# Patient Record
Sex: FEMALE | Race: WHITE | Employment: OTHER | URBAN - METROPOLITAN AREA
[De-identification: names, ages, dates, MRNs, and addresses within clinical notes are randomized per-mention and may not be internally consistent; named-entity substitution may affect disease eponyms.]

---

## 2017-01-19 ENCOUNTER — OFFICE VISIT (OUTPATIENT)
Dept: INTERNAL MEDICINE CLINIC | Age: 82
End: 2017-01-19

## 2017-01-19 VITALS
TEMPERATURE: 96.6 F | HEART RATE: 87 BPM | DIASTOLIC BLOOD PRESSURE: 90 MMHG | OXYGEN SATURATION: 98 % | HEIGHT: 64 IN | RESPIRATION RATE: 14 BRPM | SYSTOLIC BLOOD PRESSURE: 164 MMHG | WEIGHT: 164 LBS | BODY MASS INDEX: 28 KG/M2

## 2017-01-19 DIAGNOSIS — M54.2 CHRONIC NECK PAIN: ICD-10-CM

## 2017-01-19 DIAGNOSIS — Z00.00 MEDICARE ANNUAL WELLNESS VISIT, SUBSEQUENT: ICD-10-CM

## 2017-01-19 DIAGNOSIS — F41.9 ANXIETY: ICD-10-CM

## 2017-01-19 DIAGNOSIS — E78.00 HYPERCHOLESTEROLEMIA: ICD-10-CM

## 2017-01-19 DIAGNOSIS — G89.29 CHRONIC NECK PAIN: ICD-10-CM

## 2017-01-19 DIAGNOSIS — K21.9 GASTROESOPHAGEAL REFLUX DISEASE WITHOUT ESOPHAGITIS: ICD-10-CM

## 2017-01-19 DIAGNOSIS — Z71.89 ADVANCE DIRECTIVE DISCUSSED WITH PATIENT: ICD-10-CM

## 2017-01-19 DIAGNOSIS — M48.02 DEGENERATIVE CERVICAL SPINAL STENOSIS: ICD-10-CM

## 2017-01-19 DIAGNOSIS — I10 HYPERTENSION, ESSENTIAL: Primary | ICD-10-CM

## 2017-01-19 RX ORDER — CHOLECALCIFEROL (VITAMIN D3) 125 MCG
CAPSULE ORAL
COMMUNITY
End: 2018-06-04

## 2017-01-19 RX ORDER — AMLODIPINE BESYLATE 5 MG/1
5 TABLET ORAL DAILY
Qty: 90 TAB | Refills: 3
Start: 2017-01-19 | End: 2017-12-11 | Stop reason: SDUPTHER

## 2017-01-19 RX ORDER — GABAPENTIN 100 MG/1
100 CAPSULE ORAL 3 TIMES DAILY
Qty: 90 CAP | Refills: 1 | Status: SHIPPED | OUTPATIENT
Start: 2017-01-19 | End: 2017-03-02

## 2017-01-19 NOTE — PROGRESS NOTES
This is a Subsequent Medicare Annual Wellness Visit providing Personalized Prevention Plan Services (PPPS) (Performed 12 months after initial AWV and PPPS )    I have reviewed the patient's medical history in detail and updated the computerized patient record. History     Past Medical History   Diagnosis Date    GERD (gastroesophageal reflux disease)     Hypercholesterolemia     Hypertension       Past Surgical History   Procedure Laterality Date    Hx orthopaedic  1996     Lumbar laminectomy    Pr abdomen surgery proc unlisted  2005     Laproscopic colon polyp excision Dr. Yuri Ortiz Hx appendectomy       Age 25    Hx heent Bilateral      cataracts    Pr colsc flx w/rmvl of tumor polyp lesion snare tq  7/18/2014           Current Outpatient Prescriptions   Medication Sig Dispense Refill    naproxen sodium (ALEVE) 220 mg cap Take  by mouth.  lactose-reduced food with fibr (QUINOA-KALE-HEMP) liqd Take  by mouth.  amLODIPine (NORVASC) 5 mg tablet TAKE TWO TABLETS BY MOUTH ONCE DAILY. 90 Tab 3    atorvastatin (LIPITOR) 20 mg tablet TAKE ONE TABLET BY MOUTH ONCE DAILY 90 Tab 3    losartan (COZAAR) 100 mg tablet TAKE ONE TABLET BY MOUTH ONCE DAILY 90 Tab 3    ranitidine (ZANTAC) 150 mg tablet TAKE ONE TABLET BY MOUTH TWICE DAILY 180 Tab 3    cholecalciferol (VITAMIN D3) 1,000 unit cap Take  by mouth daily.  furosemide (LASIX) 40 mg tablet TAKE ONE TABLET BY MOUTH ONCE DAILY 90 Tab 0    magnesium oxide (MAG-OX) 400 mg tablet Take 1 Tab by mouth daily. 30 Tab 12    polyethylene glycol (MIRALAX) 17 gram/dose powder Take 17 g by mouth daily.        Allergies   Allergen Reactions    Contrast Agent [Iodine] Hives    Penicillins Hives    Bystolic [Nebivolol] Other (comments)     abd pain    Cardizem [Diltiazem Hcl] Rash    Cardura [Doxazosin] Unknown (comments)    Codeine Nausea Only    Prinivil [Lisinopril] Unknown (comments)    Tekturna [Aliskiren] Other (comments)     abd cramps    Tenormin [Atenolol] Unknown (comments)     Family History   Problem Relation Age of Onset    Heart Disease Mother     Stroke Mother     Cancer Father     Cancer Brother     Heart Disease Brother     Cancer Brother      Social History   Substance Use Topics    Smoking status: Never Smoker    Smokeless tobacco: Never Used    Alcohol use Not on file     Patient Active Problem List   Diagnosis Code    Hypertension, essential I10    Plantar fasciitis M72.2    Spinal stenosis M48.00    Cervical neck pain with evidence of disc disease M50.90    GERD (gastroesophageal reflux disease) K21.9    Rotator cuff rupture M75.100    Tubular adenoma of colon D12.6    Advance directive on file Z78.9    Osteopenia with high risk of fracture M85.80    Hypercholesterolemia E78.00       Depression Risk Factor Screening:     PHQ 2 / 9, over the last two weeks 1/19/2017   Little interest or pleasure in doing things Not at all   Feeling down, depressed or hopeless Not at all   Total Score PHQ 2 0     Alcohol Risk Factor Screening: On any occasion during the past 3 months, have you had more than 3 drinks containing alcohol? No    Do you average more than 7 drinks per week? No      Functional Ability and Level of Safety:     Hearing Loss   normal-to-mild    Activities of Daily Living   Self-care. Requires assistance with: no ADLs    Fall Risk     Fall Risk Assessment, last 12 mths 1/19/2017   Able to walk? Yes   Fall in past 12 months?  No     Abuse Screen   Patient is not abused    Review of Systems   Not required    Physical Examination     Evaluation of Cognitive Function:  Mood/affect:  happy  Appearance: age appropriate, casually dressed and within normal Limits  Family member/caregiver input: none      Patient Care Team:  Brina Bonilla MD as PCP - General (Internal Medicine)  Paula Pritchett MD (Pain Management)  Paty Graham MD (Ophthalmology)    Advice/Referrals/Counseling   Education and counseling provided:  End-of-Life planning (with patient's consent)    Assessment/Plan   See other note this date

## 2017-01-19 NOTE — PROGRESS NOTES
HISTORY OF PRESENT ILLNESS  Samantha Pickens is a 80 y.o. female. HPI  CARDIOVASCULAR  She presents for follow up of hypertension and hyperlipidemia. Diet and Lifestyle: generally follows a low fat low cholesterol diet, generally follows a low sodium diet, exercises sporadically, nonsmoker   Medication compliance: compliant most of the time  Medication side effects: none  Home BP Monitoring: is not measured at home. Cardiovascular ROS:  She denies palpitations, orthopnea, exertional chest pressure/discomfort, claudication, lower extremity edema, dyspnea on exertion, dizziness    GERD  She presents for follow up of gastroesophageal reflux. She denies dysphagia, has not lost weight denies heartburn. Neck pain: has had KATHY without relief by Dr Adolfo Pereira. . Had MRI with spinal stenosis and foraminal narrowing. Saw Dr Michael Valdez who said no surgical remedy. Now getting massage which seems to help. Can turn head better. Pain is still 7/10. No pain going down arms or across shoulders. Went back to gym yesterday and walked on treadmill for first time without increased pain. Tramadol did not help and made her constipated. Using Ice for 15 min than heat for 15 min and that helps too. Anxiety Review:  Taking hemp oil bid for anxiety and it helps. Patient denies: racing thoughts, psychomotor agitation, feelings of losing control, difficulty concentrating.        Patient Active Problem List   Diagnosis Code    Hypertension, essential I10    Plantar fasciitis M72.2    Spinal stenosis M48.00    Cervical neck pain with evidence of disc disease M50.90    Hyperlipidemia E78.5    GERD (gastroesophageal reflux disease) K21.9    Rotator cuff rupture M75.100    Tubular adenoma of colon D12.6    Advance directive on file Z78.9    Osteopenia with high risk of fracture M85.80     Past Medical History   Diagnosis Date    GERD (gastroesophageal reflux disease)     Hypercholesterolemia     Hypertension      Past Surgical History   Procedure Laterality Date    Hx orthopaedic  1996     Lumbar laminectomy    Pr abdomen surgery proc unlisted  2005     Laproscopic colon polyp excision Dr. Jesse Oleary Hx appendectomy       Age 25    Hx heent Bilateral      cataracts    Pr colsc flx w/rmvl of tumor polyp lesion snare tq  7/18/2014           Social History     Social History    Marital status:      Spouse name: N/A    Number of children: N/A    Years of education: N/A     Social History Main Topics    Smoking status: Never Smoker    Smokeless tobacco: Never Used    Alcohol use None    Drug use: None    Sexual activity: Not Asked     Other Topics Concern    None     Social History Narrative     Family History   Problem Relation Age of Onset    Heart Disease Mother     Stroke Mother     Cancer Father     Cancer Brother     Heart Disease Brother     Cancer Brother      Allergies   Allergen Reactions    Contrast Agent [Iodine] Hives    Penicillins Hives    Bystolic [Nebivolol] Other (comments)     abd pain    Cardizem [Diltiazem Hcl] Rash    Cardura [Doxazosin] Unknown (comments)    Codeine Nausea Only    Prinivil [Lisinopril] Unknown (comments)    Tekturna [Aliskiren] Other (comments)     abd cramps    Tenormin [Atenolol] Unknown (comments)     Current Outpatient Prescriptions   Medication Sig Dispense Refill    naproxen sodium (ALEVE) 220 mg cap Take  by mouth.  lactose-reduced food with fibr (QUINOA-KALE-HEMP) liqd Take  by mouth.  amLODIPine (NORVASC) 5 mg tablet TAKE TWO TABLETS BY MOUTH ONCE DAILY. 90 Tab 3    atorvastatin (LIPITOR) 20 mg tablet TAKE ONE TABLET BY MOUTH ONCE DAILY 90 Tab 3    losartan (COZAAR) 100 mg tablet TAKE ONE TABLET BY MOUTH ONCE DAILY 90 Tab 3    ranitidine (ZANTAC) 150 mg tablet TAKE ONE TABLET BY MOUTH TWICE DAILY 180 Tab 3    cholecalciferol (VITAMIN D3) 1,000 unit cap Take  by mouth daily.       furosemide (LASIX) 40 mg tablet TAKE ONE TABLET BY MOUTH ONCE DAILY 90 Tab 0    magnesium oxide (MAG-OX) 400 mg tablet Take 1 Tab by mouth daily. 30 Tab 12    polyethylene glycol (MIRALAX) 17 gram/dose powder Take 17 g by mouth daily. Review of Systems   Constitutional: Negative for malaise/fatigue and weight loss. Gastrointestinal: Negative for constipation (Miralax), diarrhea and heartburn. Musculoskeletal: Positive for neck pain. Negative for back pain and joint pain. Neurological: Negative for dizziness, tingling and focal weakness. Visit Vitals    /90 (BP 1 Location: Left arm, BP Patient Position: Sitting)    Pulse 87    Temp 96.6 °F (35.9 °C) (Oral)    Resp 14    Ht 5' 4\" (1.626 m)    Wt 164 lb (74.4 kg)    SpO2 98%    BMI 28.15 kg/m2     Physical Exam   Constitutional: She is oriented to person, place, and time. She appears well-developed and well-nourished. HENT:   Head: Normocephalic and atraumatic. Eyes: Conjunctivae are normal. Pupils are equal, round, and reactive to light. Neck: Neck supple. Carotid bruit is not present. No thyromegaly present. Cardiovascular: Normal rate, regular rhythm and normal heart sounds. PMI is not displaced. Exam reveals no gallop. No murmur heard. Pulses:       Dorsalis pedis pulses are 2+ on the right side, and 2+ on the left side. Posterior tibial pulses are 2+ on the right side, and 2+ on the left side. Pulmonary/Chest: Effort normal. She has no wheezes. She has no rhonchi. She has no rales. Abdominal: Soft. Normal appearance. She exhibits no abdominal bruit and no mass. There is no hepatosplenomegaly. There is no tenderness. Musculoskeletal: She exhibits no edema. Cervical back: She exhibits tenderness, pain and spasm. She exhibits no bony tenderness. Back:    Lymphadenopathy:     She has no cervical adenopathy. Right: No supraclavicular adenopathy present. Left: No inguinal and no supraclavicular adenopathy present. Neurological: She is alert and oriented to person, place, and time. No sensory deficit. Skin: Skin is warm, dry and intact. No rash noted. Psychiatric: She has a normal mood and affect. Her behavior is normal.   Nursing note and vitals reviewed. Results for orders placed or performed in visit on 67/30/64   METABOLIC PANEL, COMPREHENSIVE   Result Value Ref Range    Glucose 97 65 - 99 mg/dL    BUN 15 8 - 27 mg/dL    Creatinine 0.83 0.57 - 1.00 mg/dL    GFR est non-AA 66 >59 mL/min/1.73    GFR est AA 76 >59 mL/min/1.73    BUN/Creatinine ratio 18 11 - 26    Sodium 143 134 - 144 mmol/L    Potassium 4.1 3.5 - 5.2 mmol/L    Chloride 103 97 - 108 mmol/L    CO2 21 18 - 29 mmol/L    Calcium 9.9 8.7 - 10.3 mg/dL    Protein, total 6.8 6.0 - 8.5 g/dL    Albumin 4.4 3.5 - 4.7 g/dL    GLOBULIN, TOTAL 2.4 1.5 - 4.5 g/dL    A-G Ratio 1.8 1.1 - 2.5    Bilirubin, total 0.4 0.0 - 1.2 mg/dL    Alk. phosphatase 94 39 - 117 IU/L    AST 34 0 - 40 IU/L    ALT 31 0 - 32 IU/L   LIPID PANEL   Result Value Ref Range    Cholesterol, total 158 100 - 199 mg/dL    Triglyceride 127 0 - 149 mg/dL    HDL Cholesterol 45 >39 mg/dL    VLDL, calculated 25 5 - 40 mg/dL    LDL, calculated 88 0 - 99 mg/dL   CVD REPORT   Result Value Ref Range    INTERPRETATION Note        ASSESSMENT and PLAN    ICD-10-CM ICD-9-CM    1. Hypertension, essential I10 401.9 amLODIPine (NORVASC) 5 mg tablet   2. Hypercholesterolemia X79.15 765.0 METABOLIC PANEL, COMPREHENSIVE      LIPID PANEL   3. Gastroesophageal reflux disease without esophagitis K21.9 530.81    4. Degenerative cervical spinal stenosis M48.02 723.0 gabapentin (NEURONTIN) 100 mg capsule   5. Chronic neck pain M54.2 723.1     G89.29 338.29    6. Anxiety F41.9 300.00    7. Medicare annual wellness visit, subsequent Z00.00 V70.0    8.  Advance directive discussed with patient Z71.89 V65.49          Hypertension, essential  Hypertension is uncontrolled - medication changes/orders   Limit naproxen  - amLODIPine (NORVASC) 5 mg tablet; Take 1 Tab by mouth daily. Hypercholesterolemia  Hyperlipidemia is controlled  -     METABOLIC PANEL, COMPREHENSIVE; Future  -     LIPID PANEL; Future    Gastroesophageal reflux disease without esophagitis  Stable    Degenerative cervical spinal stenosis  -     gabapentin (NEURONTIN) 100 mg capsule; Take 1 Cap by mouth three (3) times daily. Chronic neck pain  I think some of her pain is muscular compounding the degenerative disease that is also causing symptoms. Anxiety  Controlled    Medicare annual wellness visit, subsequent    Advance directive discussed with patient      Follow-up Disposition:  Return in about 6 weeks (around 3/2/2017) for HTN, neck pain . lab results and schedule of future lab studies reviewed with patient  reviewed diet, exercise and weight control  cardiovascular risk and specific lipid/LDL goals reviewed  I have discussed the diagnosis with the patient and the intended plan as seen in the above orders. Patient is in agreement. The patient has received an after-visit summary and questions were answered concerning future plans. I have discussed medication side effects and warnings with the patient as well.

## 2017-01-19 NOTE — MR AVS SNAPSHOT
Visit Information Date & Time Provider Department Dept. Phone Encounter #  
 1/19/2017  2:00 PM Deepa Poon MD [facility] [phone] [id number] Follow-up Instructions Return in about 6 weeks (around 3/2/2017) for HTN, neck pain . Upcoming Health Maintenance Date Due  
 MEDICARE YEARLY EXAM 10/26/2016 GLAUCOMA SCREENING Q2Y 3/1/2018 DTaP/Tdap/Td series (2 - Td) 7/16/2023 Allergies as of 1/19/2017  Review Complete On: 1/19/2017 By: Deepa Poon MD  
  
 Severity Noted Reaction Type Reactions Contrast Agent [Iodine] High 03/01/2010    Hives Penicillins High 03/01/2010    Hives Bystolic [Nebivolol]  51/32/2097    Other (comments)  
 abd pain  
 Cardizem [Diltiazem Hcl]  03/01/2010    Rash Cardura [Doxazosin]  03/01/2010    Unknown (comments) Codeine  03/01/2010    Nausea Only Cymbalta [Duloxetine]  01/19/2017    Other (comments) Abdominal pain, anxiety Prinivil [Lisinopril]  03/01/2010    Unknown (comments) Tekturna [Aliskiren]  03/01/2010    Other (comments)  
 abd cramps Tenormin [Atenolol]  03/01/2010    Unknown (comments) Current Immunizations  Reviewed on 1/19/2017 Name Date Influenza High Dose Vaccine PF 12/12/2016, 10/26/2015, 10/3/2014 Influenza Vaccine 9/17/2013 Pneumococcal Conjugate (PCV-13) 4/3/2015 Pneumococcal Vaccine (Unspecified Type) 10/1/2002 TD Vaccine 6/1/2007 Tdap 7/16/2013 Zoster 6/30/2009 Reviewed by Raghu Campos LPN on 2/94/2505 at  1:54 PM  
You Were Diagnosed With   
  
 Codes Comments Hypertension, essential    -  Primary ICD-10-CM: I10 
ICD-9-CM: 401.9 Hypercholesterolemia     ICD-10-CM: E78.00 ICD-9-CM: 272.0 Gastroesophageal reflux disease without esophagitis     ICD-10-CM: K21.9 ICD-9-CM: 530.81 Degenerative cervical spinal stenosis     ICD-10-CM: M48.02 
ICD-9-CM: 723.0 Medicare annual wellness visit, subsequent     ICD-10-CM: Z00.00 ICD-9-CM: V70.0 Advance directive discussed with patient     ICD-10-CM: Z71.89 ICD-9-CM: V65.49 Vitals BP Pulse Temp Resp Height(growth percentile) Weight(growth percentile) 164/90 (BP 1 Location: Left arm, BP Patient Position: Sitting) 87 96.6 °F (35.9 °C) (Oral) 14 5' 4\" (1.626 m) 164 lb (74.4 kg) SpO2 BMI OB Status Smoking Status 98% 28.15 kg/m2 Postmenopausal Never Smoker Vitals History BMI and BSA Data Body Mass Index Body Surface Area  
 28.15 kg/m 2 1.83 m 2 Preferred Pharmacy Pharmacy Name Phone [facility] [address] [place] [address] [phone] Your Updated Medication List  
  
   
This list is accurate as of: 1/19/17  3:08 PM.  Always use your most recent med list.  
  
  
  
  
 ALEVE 220 mg Cap Generic drug:  naproxen sodium Take  by mouth. amLODIPine 5 mg tablet Commonly known as:  [place] Take 1 Tab by mouth daily. atorvastatin 20 mg tablet Commonly known as:  LIPITOR  
TAKE ONE TABLET BY MOUTH ONCE DAILY  
  
 cholecalciferol 1,000 unit Cap Commonly known as:  VITAMIN D3 Take  by mouth daily. furosemide 40 mg tablet Commonly known as:  LASIX TAKE ONE TABLET BY MOUTH ONCE DAILY  
  
 gabapentin 100 mg capsule Commonly known as:  NEURONTIN Take 1 Cap by mouth three (3) times daily. losartan 100 mg tablet Commonly known as:  COZAAR  
TAKE ONE TABLET BY MOUTH ONCE DAILY  
  
 magnesium oxide 400 mg tablet Commonly known as:  MAG-OX Take 1 Tab by mouth daily. MIRALAX 17 gram/dose powder Generic drug:  polyethylene glycol Take 17 g by mouth daily. [place] Generic drug:  lactose-reduced food with fibr Take  by mouth. raNITIdine 150 mg tablet Commonly known as:  ZANTAC TAKE ONE TABLET BY MOUTH TWICE DAILY Prescriptions Sent to Pharmacy Refills gabapentin (NEURONTIN) 100 mg capsule 1 Sig: Take 1 Cap by mouth three (3) times daily. Class: Normal  
 Pharmacy: [id number] #: [phone] Route: Oral  
  
Follow-up Instructions Return in about 6 weeks (around 3/2/2017) for HTN, neck pain . To-Do List   
 07/19/2017 Lab:  LIPID PANEL   
  
 07/19/2017 Lab:  METABOLIC PANEL, COMPREHENSIVE Patient Instructions Start gabapentin 100 mg 3 times a day. If it does not help, call me and we will gradually increase the dose. Take as little Aleve as possible. Return in 6 week for blood pressure Return in 6 weeks and we will see if blood pressure comes down as pain decreases. The best way to stay healthy is to live a healthy lifestyle. A healthy lifestyle includes regular exercise, eating a well-balanced diet, keeping a healthy weight and not smoking. Regular physical exams and screening tests are another important way to take care of yourself. Preventive exams provided by health care providers can find health problems early when treatment works best and can keep you from getting certain diseases or illnesses. Preventive services include exams, lab tests, screenings, shots, monitoring and information to help you take care of your own health. All people over 65 should have a pneumonia shot. Pneumonia shots are usually only needed once in a lifetime unless your doctor decides differently. In addition to your physical exam, some screening tests are recommended: 
 
All people over 65 should have a yearly flu shot. People over 65 are at medium to high risk for Hepatitis B. Three shots are needed for complete protection. Bone mass measurement (dexa scan) is recommended every two years. Diabetes Mellitus screening is recommended every year. Glaucoma is an eye disease caused by high pressure in the eye. An eye exam is recommended every year. Cardiovascular screening tests that check your cholesterol and other blood fat (lipid) levels are recommended every five years. Colorectal Cancer screening tests help to find pre-cancerous polyps (growths in the colon) so they can be removed before they turn into cancer. Tests ordered for screening depend on your personal and family history risk factors. Prostate Cancer Screening (annually up to age 76) Screening for breast cancer is recommended yearly with a Mammogram. 
 
Screening for cervical and vaginal cancer is recommended with a pelvic and Pap test every two years. However if you have had an abnormal pap in the past  three years or at high risk for cervical or vaginal cancer Medicare will cover a pap test and a pelvic exam every year. Here is a list of your current Health Maintenance items with a due date: 
Health Maintenance Due Topic Date Due  
 Annual Well Visit  10/26/2016 High Blood Pressure: Care Instructions Your Care Instructions If your blood pressure is usually above 140/90, you have high blood pressure, or hypertension. That means the top number is 140 or higher or the bottom number is 90 or higher, or both. Despite what a lot of people think, high blood pressure usually doesn't cause headaches or make you feel dizzy or lightheaded. It usually has no symptoms. But it does increase your risk for heart attack, stroke, and kidney or eye damage. The higher your blood pressure, the more your risk increases. Your doctor will give you a goal for your blood pressure. Your goal will be based on your health and your age. An example of a goal is to keep your blood pressure below 140/90. Lifestyle changes, such as eating healthy and being active, are always important to help lower blood pressure. You might also take medicine to reach your blood pressure goal. 
Follow-up care is a key part of your treatment and safety.  Be sure to make and go to all appointments, and call your doctor if you are having problems. It's also a good idea to know your test results and keep a list of the medicines you take. How can you care for yourself at home? Medical treatment · If you stop taking your medicine, your blood pressure will go back up. You may take one or more types of medicine to lower your blood pressure. Be safe with medicines. Take your medicine exactly as prescribed. Call your doctor if you think you are having a problem with your medicine. · Talk to your doctor before you start taking aspirin every day. Aspirin can help certain people lower their risk of a heart attack or stroke. But taking aspirin isn't right for everyone, because it can cause serious bleeding. · See your doctor regularly. You may need to see the doctor more often at first or until your blood pressure comes down. · If you are taking blood pressure medicine, talk to your doctor before you take decongestants or anti-inflammatory medicine, such as ibuprofen. Some of these medicines can raise blood pressure. · Learn how to check your blood pressure at home. Lifestyle changes · Stay at a healthy weight. This is especially important if you put on weight around the waist. Losing even 10 pounds can help you lower your blood pressure. · If your doctor recommends it, get more exercise. Walking is a good choice. Bit by bit, increase the amount you walk every day. Try for at least 30 minutes on most days of the week. You also may want to swim, bike, or do other activities. · Avoid or limit alcohol. Talk to your doctor about whether you can drink any alcohol. · Try to limit how much sodium you eat to less than 2,300 milligrams (mg) a day. Your doctor may ask you to try to eat less than 1,500 mg a day. · Eat plenty of fruits (such as bananas and oranges), vegetables, legumes, whole grains, and low-fat dairy products. · Lower the amount of saturated fat in your diet. Saturated fat is found in animal products such as milk, cheese, and meat. Limiting these foods may help you lose weight and also lower your risk for heart disease. · Do not smoke. Smoking increases your risk for heart attack and stroke. If you need help quitting, talk to your doctor about stop-smoking programs and medicines. These can increase your chances of quitting for good. When should you call for help? Call 911 anytime you think you may need emergency care. This may mean having symptoms that suggest that your blood pressure is causing a serious heart or blood vessel problem. Your blood pressure may be over 180/110. For example, call 911 if: 
· You have symptoms of a heart attack. These may include: ¨ Chest pain or pressure, or a strange feeling in the chest. 
¨ Sweating. ¨ Shortness of breath. ¨ Nausea or vomiting. ¨ Pain, pressure, or a strange feeling in the back, neck, jaw, or upper belly or in one or both shoulders or arms. ¨ Lightheadedness or sudden weakness. ¨ A fast or irregular heartbeat. · You have symptoms of a stroke. These may include: 
¨ Sudden numbness, tingling, weakness, or loss of movement in your face, arm, or leg, especially on only one side of your body. ¨ Sudden vision changes. ¨ Sudden trouble speaking. ¨ Sudden confusion or trouble understanding simple statements. ¨ Sudden problems with walking or balance. ¨ A sudden, severe headache that is different from past headaches. · You have severe back or belly pain. Do not wait until your blood pressure comes down on its own. Get help right away. Call your doctor now or seek immediate care if: 
· Your blood pressure is much higher than normal (such as 180/110 or higher), but you don't have symptoms. · You think high blood pressure is causing symptoms, such as: ¨ Severe headache. ¨ Blurry vision.  
Watch closely for changes in your health, and be sure to contact your doctor if: 
· Your blood pressure measures 140/90 or higher at least 2 times. That means the top number is 140 or higher or the bottom number is 90 or higher, or both. · You think you may be having side effects from your blood pressure medicine. · Your blood pressure is usually normal, but it goes above normal at least 2 times. Where can you learn more? Go to http://hua-skip.info/. Enter X576 in the search box to learn more about \"High Blood Pressure: Care Instructions. \" Current as of: August 8, 2016 Content Version: 11.1 © 2056-6328 [employer]. Care instructions adapted under license by [employer] (which disclaims liability or warranty for this information). If you have questions about a medical condition or this instruction, always ask your healthcare professional. [address] any warranty or liability for your use of this information. Introducing [facility]! Abhishek Bonner introduces [employer] patient portal. Now you can access parts of your medical record, email your doctor's office, and request medication refills online. 1. In your internet browser, go to [email or web address] 2. Click on the First Time User? Click Here link in the Sign In box. You will see the New Member Sign Up page. 3. Enter your Med[employer] Access Code exactly as it appears below. You will not need to use this code after youve completed the sign-up process. If you do not sign up before the expiration date, you must request a new code. · Med[employer] Solution Access Code: [email or web address] Expires: 2/16/2017  2:22 PM 
 
4. Enter the last four digits of your Social Security Number (xxxx) and Date of Birth (mm/dd/yyyy) as indicated and click Submit. You will be taken to the next sign-up page. 5. Create a Meditech Solution ID. This will be your [employer] login ID and cannot be changed, so think of one that is secure and easy to remember. 6. Create a lensgen password. You can change your password at any time. 7. Enter your Password Reset Question and Answer. This can be used at a later time if you forget your password. 8. Enter your e-mail address. You will receive e-mail notification when new information is available in [address] 9. Click Sign Up. You can now view and download portions of your medical record. 10. Click the Download Summary menu link to download a portable copy of your medical information. If you have questions, please visit the Frequently Asked Questions section of the lensgen website. Remember, lensgen is NOT to be used for urgent needs. For medical emergencies, dial 911. Now available from your iPhone and Android! Please provide this summary of care documentation to your next provider. Your primary care clinician is listed as Andre Kirkland. If you have any questions after today's visit, please call [phone].

## 2017-01-19 NOTE — PATIENT INSTRUCTIONS
Start gabapentin 100 mg 3 times a day. If it does not help, call me and we will gradually increase the dose. Take as little Aleve as possible. Return in 6 week for blood pressure      Return in 6 weeks and we will see if blood pressure comes down as pain decreases. The best way to stay healthy is to live a healthy lifestyle. A healthy lifestyle includes regular exercise, eating a well-balanced diet, keeping a healthy weight and not smoking. Regular physical exams and screening tests are another important way to take care of yourself. Preventive exams provided by health care providers can find health problems early when treatment works best and can keep you from getting certain diseases or illnesses. Preventive services include exams, lab tests, screenings, shots, monitoring and information to help you take care of your own health. All people over 65 should have a pneumonia shot. Pneumonia shots are usually only needed once in a lifetime unless your doctor decides differently. In addition to your physical exam, some screening tests are recommended:    All people over 65 should have a yearly flu shot. People over 65 are at medium to high risk for Hepatitis B. Three shots are needed for complete protection. Bone mass measurement (dexa scan) is recommended every two years. Diabetes Mellitus screening is recommended every year. Glaucoma is an eye disease caused by high pressure in the eye. An eye exam is recommended every year. Cardiovascular screening tests that check your cholesterol and other blood fat (lipid) levels are recommended every five years. Colorectal Cancer screening tests help to find pre-cancerous polyps (growths in the colon) so they can be removed before they turn into cancer. Tests ordered for screening depend on your personal and family history risk factors.     Prostate Cancer Screening (annually up to age 76)    Screening for breast cancer is recommended yearly with a Mammogram.    Screening for cervical and vaginal cancer is recommended with a pelvic and Pap test every two years. However if you have had an abnormal pap in the past  three years or at high risk for cervical or vaginal cancer Medicare will cover a pap test and a pelvic exam every year. Here is a list of your current Health Maintenance items with a due date:  Health Maintenance Due   Topic Date Due    Annual Well Visit  10/26/2016       High Blood Pressure: Care Instructions  Your Care Instructions  If your blood pressure is usually above 140/90, you have high blood pressure, or hypertension. That means the top number is 140 or higher or the bottom number is 90 or higher, or both. Despite what a lot of people think, high blood pressure usually doesn't cause headaches or make you feel dizzy or lightheaded. It usually has no symptoms. But it does increase your risk for heart attack, stroke, and kidney or eye damage. The higher your blood pressure, the more your risk increases. Your doctor will give you a goal for your blood pressure. Your goal will be based on your health and your age. An example of a goal is to keep your blood pressure below 140/90. Lifestyle changes, such as eating healthy and being active, are always important to help lower blood pressure. You might also take medicine to reach your blood pressure goal.  Follow-up care is a key part of your treatment and safety. Be sure to make and go to all appointments, and call your doctor if you are having problems. It's also a good idea to know your test results and keep a list of the medicines you take. How can you care for yourself at home? Medical treatment  · If you stop taking your medicine, your blood pressure will go back up. You may take one or more types of medicine to lower your blood pressure. Be safe with medicines. Take your medicine exactly as prescribed.  Call your doctor if you think you are having a problem with your medicine. · Talk to your doctor before you start taking aspirin every day. Aspirin can help certain people lower their risk of a heart attack or stroke. But taking aspirin isn't right for everyone, because it can cause serious bleeding. · See your doctor regularly. You may need to see the doctor more often at first or until your blood pressure comes down. · If you are taking blood pressure medicine, talk to your doctor before you take decongestants or anti-inflammatory medicine, such as ibuprofen. Some of these medicines can raise blood pressure. · Learn how to check your blood pressure at home. Lifestyle changes  · Stay at a healthy weight. This is especially important if you put on weight around the waist. Losing even 10 pounds can help you lower your blood pressure. · If your doctor recommends it, get more exercise. Walking is a good choice. Bit by bit, increase the amount you walk every day. Try for at least 30 minutes on most days of the week. You also may want to swim, bike, or do other activities. · Avoid or limit alcohol. Talk to your doctor about whether you can drink any alcohol. · Try to limit how much sodium you eat to less than 2,300 milligrams (mg) a day. Your doctor may ask you to try to eat less than 1,500 mg a day. · Eat plenty of fruits (such as bananas and oranges), vegetables, legumes, whole grains, and low-fat dairy products. · Lower the amount of saturated fat in your diet. Saturated fat is found in animal products such as milk, cheese, and meat. Limiting these foods may help you lose weight and also lower your risk for heart disease. · Do not smoke. Smoking increases your risk for heart attack and stroke. If you need help quitting, talk to your doctor about stop-smoking programs and medicines. These can increase your chances of quitting for good. When should you call for help? Call 911 anytime you think you may need emergency care.  This may mean having symptoms that suggest that your blood pressure is causing a serious heart or blood vessel problem. Your blood pressure may be over 180/110. For example, call 911 if:  · You have symptoms of a heart attack. These may include:  ¨ Chest pain or pressure, or a strange feeling in the chest.  ¨ Sweating. ¨ Shortness of breath. ¨ Nausea or vomiting. ¨ Pain, pressure, or a strange feeling in the back, neck, jaw, or upper belly or in one or both shoulders or arms. ¨ Lightheadedness or sudden weakness. ¨ A fast or irregular heartbeat. · You have symptoms of a stroke. These may include:  ¨ Sudden numbness, tingling, weakness, or loss of movement in your face, arm, or leg, especially on only one side of your body. ¨ Sudden vision changes. ¨ Sudden trouble speaking. ¨ Sudden confusion or trouble understanding simple statements. ¨ Sudden problems with walking or balance. ¨ A sudden, severe headache that is different from past headaches. · You have severe back or belly pain. Do not wait until your blood pressure comes down on its own. Get help right away. Call your doctor now or seek immediate care if:  · Your blood pressure is much higher than normal (such as 180/110 or higher), but you don't have symptoms. · You think high blood pressure is causing symptoms, such as:  ¨ Severe headache. ¨ Blurry vision. Watch closely for changes in your health, and be sure to contact your doctor if:  · Your blood pressure measures 140/90 or higher at least 2 times. That means the top number is 140 or higher or the bottom number is 90 or higher, or both. · You think you may be having side effects from your blood pressure medicine. · Your blood pressure is usually normal, but it goes above normal at least 2 times. Where can you learn more? Go to http://hua-skip.info/. Enter P262 in the search box to learn more about \"High Blood Pressure: Care Instructions. \"  Current as of: August 8, 2016  Content Version: 11.1  © 9342-2005 [employer]. Care instructions adapted under license by [employer] (which disclaims liability or warranty for this information). If you have questions about a medical condition or this instruction, always ask your healthcare professional. [address] any warranty or liability for your use of this information.

## 2017-01-19 NOTE — PROGRESS NOTES
Reviewed record In preparation for visit and have obtained necessary documentation. Advanced directive / living will on file, she brought in an updated copy for her chart. 1. Have you been to the ER, urgent care clinic or hospitalized since your last visit? No     2. Have you seen or consulted any other health care providers outside of the [facility] since your last visit? Include any pap smears or colon screening.  MRI, Dr Melyssa Polanco, Dr Benito Wu Maintenance reviewed:

## 2017-01-22 NOTE — ACP (ADVANCE CARE PLANNING)
She brought completed ACP documents today. These were reviewed with her. Primary SDM wis daughter Bakari Colmenares. Secondary SDM is son Alka Vazquez. She wants no life prolonging treatment if death is imminent or there is overwhelming, permanent neurologic injury.

## 2017-01-23 ENCOUNTER — TELEPHONE (OUTPATIENT)
Dept: INTERNAL MEDICINE CLINIC | Age: 82
End: 2017-01-23

## 2017-01-23 NOTE — TELEPHONE ENCOUNTER
Patient reports gabapentin is not helping with her neck pain. She said it is causing some acid reflux. Patient has twitched back to tylenol.

## 2017-01-23 NOTE — TELEPHONE ENCOUNTER
I explained to her that it is not a pain reliever, but a medication that calms the nerves that transmit pain signals. The dose was very low at 100 mg 3 times a day. It takes at least 2 weeks to see an improvement and often higher doses are needed. I cannot find that it causes acid reflux, (but she already had that). She does not have to take it if she does not want to, but she really did not give it a fair chance.

## 2017-01-24 NOTE — TELEPHONE ENCOUNTER
Patient informed, patient continues to feel she does not want to take Neurontin and will follow up with dr Pablo Neves in march 2017

## 2017-03-02 ENCOUNTER — OFFICE VISIT (OUTPATIENT)
Dept: INTERNAL MEDICINE CLINIC | Age: 82
End: 2017-03-02

## 2017-03-02 VITALS
DIASTOLIC BLOOD PRESSURE: 97 MMHG | TEMPERATURE: 96.1 F | WEIGHT: 163.5 LBS | RESPIRATION RATE: 14 BRPM | HEIGHT: 64 IN | SYSTOLIC BLOOD PRESSURE: 168 MMHG | BODY MASS INDEX: 27.91 KG/M2 | HEART RATE: 93 BPM | OXYGEN SATURATION: 97 %

## 2017-03-02 DIAGNOSIS — M48.02 CERVICAL SPINAL STENOSIS: ICD-10-CM

## 2017-03-02 DIAGNOSIS — I10 HYPERTENSION, ESSENTIAL: Primary | ICD-10-CM

## 2017-03-02 DIAGNOSIS — E78.00 HYPERCHOLESTEROLEMIA: ICD-10-CM

## 2017-03-02 NOTE — PROGRESS NOTES
HISTORY OF PRESENT ILLNESS  Bebeto Garcia is a 80 y.o. female. HPI  She presents for follow up of hypertension and hyperlipidemia. Diet and Lifestyle: generally follows a low fat low cholesterol diet, generally follows a low sodium diet, exercises sporadically, nonsmoker Blood pressure on 1/19 was 164/90. She was asked to limit NSAID's. Medication compliance: compliant most of the time  Medication side effects: none  Home BP Monitoring: is borderline controlled at home, ranging 132-151's/71-88's. Cardiovascular ROS:  She denies palpitations, orthopnea, exertional chest pressure/discomfort, claudication, lower extremity edema, dyspnea on exertion, dizziness    She presents for follow up of chronic left neck pain into occipital area and ear, off and on for years, but worse in past 6 months or so. She has had physical therapy, dry needling, foraminal injections, all without relief. She saw Dr Alida Villafuerte about possible surgery. His opinion was that the severe cervical stenosis was a bit over called. He thinks given multilevel disease, surgery would only trade one neck pain for another. Pain has improved somewhat by decreasing anxiety (Hemp oil) and having massage therapy. Therapist has given her some exercise that helps too. Gabapentin added at last visit, 100 mg three times a day, made her \"feel crazy. \" Pain quality is described as burning and severity at worst is  6/10 and at least is 4/10. Pain is present worse in the morning. Tramadol does not help. At last visit we added duloxetine which caused anxiety and abdominal pain. She stopped taking it. She has had side effects from duloxetine. She is resigned to living with the pain.         Patient Active Problem List   Diagnosis Code    Hypertension, essential I10    Plantar fasciitis M72.2    Spinal stenosis M48.00    Cervical neck pain with evidence of disc disease M50.90    GERD (gastroesophageal reflux disease) K21.9    Rotator cuff rupture M75.100    Tubular adenoma of colon D12.6    Advance directive on file Z78.9    Osteopenia with high risk of fracture M85.80    Hypercholesterolemia E78.00     Past Medical History:   Diagnosis Date    GERD (gastroesophageal reflux disease)     Hypercholesterolemia     Hypertension      Past Surgical History:   Procedure Laterality Date    ABDOMEN SURGERY PROC UNLISTED  2005    Laproscopic colon polyp excision Dr. Jesse Olaery HX APPENDECTOMY      Age 25    HX HEENT Bilateral     cataracts    HX ORTHOPAEDIC  1996    Lumbar laminectomy    FL COLSC FLX W/RMVL OF TUMOR POLYP LESION SNARE TQ  7/18/2014          Social History     Social History    Marital status:      Spouse name: N/A    Number of children: N/A    Years of education: N/A     Social History Main Topics    Smoking status: Never Smoker    Smokeless tobacco: Never Used    Alcohol use None    Drug use: None    Sexual activity: Not Asked     Other Topics Concern    None     Social History Narrative     Family History   Problem Relation Age of Onset    Heart Disease Mother     Stroke Mother     Cancer Father     Cancer Brother     Heart Disease Brother     Cancer Brother      Allergies   Allergen Reactions    Contrast Agent [Iodine] Hives    Penicillins Hives    Bystolic [Nebivolol] Other (comments)     abd pain    Cardizem [Diltiazem Hcl] Rash    Cardura [Doxazosin] Unknown (comments)    Codeine Nausea Only    Cymbalta [Duloxetine] Other (comments)     Abdominal pain, anxiety    Gabapentin Other (comments)     Made her feel crazy    Prinivil [Lisinopril] Unknown (comments)    Tekturna [Aliskiren] Other (comments)     abd cramps    Tenormin [Atenolol] Unknown (comments)     Current Outpatient Prescriptions   Medication Sig Dispense Refill    furosemide (LASIX) 40 mg tablet TAKE ONE TABLET BY MOUTH ONCE DAILY 90 Tab 1    naproxen sodium (ALEVE) 220 mg cap Take  by mouth.       lactose-reduced food with fibr ([place]) liqd Take  by mouth.  amLODIPine (NORVASC) 5 mg tablet Take 1 Tab by mouth daily. 90 Tab 3    atorvastatin (LIPITOR) 20 mg tablet TAKE ONE TABLET BY MOUTH ONCE DAILY 90 Tab 3    losartan (COZAAR) 100 mg tablet TAKE ONE TABLET BY MOUTH ONCE DAILY 90 Tab 3    cholecalciferol (VITAMIN D3) 1,000 unit cap Take  by mouth daily.  magnesium oxide (MAG-OX) 400 mg tablet Take 1 Tab by mouth daily. 30 Tab 12    polyethylene glycol (MIRALAX) 17 gram/dose powder Take 17 g by mouth daily. Review of Systems   Constitutional: Negative for malaise/fatigue and weight loss. Gastrointestinal: Negative for constipation, diarrhea and heartburn. Musculoskeletal: Negative for back pain and joint pain. Neurological: Negative for dizziness, tingling and focal weakness. Visit Vitals    BP (!) 168/97 (BP 1 Location: Left arm, BP Patient Position: Sitting)    Pulse 93    Temp 96.1 °F (35.6 °C) (Oral)    Resp 14    Ht 5' 4\" (1.626 m)    Wt 163 lb 8 oz (74.2 kg)    SpO2 97%    BMI 28.06 kg/m2     Physical Exam   Constitutional: She is oriented to person, place, and time. She appears well-developed and well-nourished. HENT:   Head: Normocephalic and atraumatic. Eyes: Conjunctivae are normal. Pupils are equal, round, and reactive to light. Neck: Neck supple. Carotid bruit is not present. No thyromegaly present. Cardiovascular: Normal rate, regular rhythm and normal heart sounds. PMI is not displaced. Exam reveals no gallop. No murmur heard. Pulses:       Dorsalis pedis pulses are 2+ on the right side, and 2+ on the left side. Posterior tibial pulses are 2+ on the right side, and 2+ on the left side. Pulmonary/Chest: Effort normal. She has no wheezes. She has no rhonchi. She has no rales. Abdominal: Soft. Normal appearance. She exhibits no abdominal bruit and no mass. There is no hepatosplenomegaly. There is no tenderness. Musculoskeletal: She exhibits no edema.         Cervical back: She exhibits decreased range of motion, tenderness (left sternomastoid region), pain and spasm (mild). She exhibits no bony tenderness, no swelling and no deformity. Lymphadenopathy:     She has no cervical adenopathy. Right: No supraclavicular adenopathy present. Left: No inguinal and no supraclavicular adenopathy present. Neurological: She is alert and oriented to person, place, and time. No sensory deficit. Motor strength is 5/5 to finger flexion/extension, abduction and opposition, wrist flexion/extension, elbow flexion extension right and left     Skin: Skin is warm, dry and intact. No rash noted. Psychiatric: She has a normal mood and affect. Her behavior is normal.   Nursing note and vitals reviewed. Lab Results   Component Value Date/Time    Cholesterol, total 158 07/12/2016 07:48 AM    HDL Cholesterol 45 07/12/2016 07:48 AM    LDL, calculated 88 07/12/2016 07:48 AM    VLDL, calculated 25 07/12/2016 07:48 AM    Triglyceride 127 07/12/2016 07:48 AM         ASSESSMENT and PLAN    ICD-10-CM ICD-9-CM    1. Hypertension, essential I10 401.9    2. Cervical spinal stenosis M48.02 723.0    3. Hypercholesterolemia T13.88 015.5 METABOLIC PANEL, COMPREHENSIVE      LIPID PANEL         Hypertension, essential  Blood pressure is not controlled, but she declines and medication change    Cervical spinal stenosis  She will continue massage therapy. Hypercholesterolemia  -     METABOLIC PANEL, COMPREHENSIVE; Future  -     LIPID PANEL; Future      Follow-up Disposition:  Return in about 5 months (around 8/2/2017) for HTN, neck pain, chol.  reviewed diet, exercise and weight control  I have discussed the diagnosis with the patient and the intended plan as seen in the above orders. Patient is in agreement. The patient has received an after-visit summary and questions were answered concerning future plans. I have discussed medication side effects and warnings with the patient as well.

## 2017-03-02 NOTE — MR AVS SNAPSHOT
Visit Information Date & Time Provider Department Dept. Phone Encounter #  
 3/2/2017 10:00 AM Dalia Garcia, [address] [facility] [phone] [id number] Follow-up Instructions Return in about 5 months (around 8/2/2017) for HTN, neck pain, chol. Upcoming Health Maintenance Date Due  
 MEDICARE YEARLY EXAM 1/20/2018 GLAUCOMA SCREENING Q2Y 3/1/2018 DTaP/Tdap/Td series (2 - Td) 7/16/2023 Allergies as of 3/2/2017  Review Complete On: 3/2/2017 By: Dalia Garcia MD  
  
 Severity Noted Reaction Type Reactions Contrast Agent [Iodine] High 03/01/2010    Hives Penicillins High 03/01/2010    Hives Bystolic [Nebivolol]  18/70/4288    Other (comments)  
 abd pain  
 Cardizem [Diltiazem Hcl]  03/01/2010    Rash Cardura [Doxazosin]  03/01/2010    Unknown (comments) Codeine  03/01/2010    Nausea Only Cymbalta [Duloxetine]  01/19/2017    Other (comments) Abdominal pain, anxiety Gabapentin  03/02/2017    Other (comments) Made her feel crazy Prinivil [Lisinopril]  03/01/2010    Unknown (comments) Tekturna [Aliskiren]  03/01/2010    Other (comments)  
 abd cramps Tenormin [Atenolol]  03/01/2010    Unknown (comments) Current Immunizations  Reviewed on 3/2/2017 Name Date Influenza High Dose Vaccine PF 12/12/2016, 10/26/2015, 10/3/2014 Influenza Vaccine 9/17/2013 Pneumococcal Conjugate (PCV-13) 4/3/2015 Pneumococcal Vaccine (Unspecified Type) 10/1/2002 TD Vaccine 6/1/2007 Tdap 7/16/2013 Zoster 6/30/2009 Reviewed by Susan Pappas LPN on 6/2/0718 at  2:97 AM  
You Were Diagnosed With   
  
 Codes Comments Hypertension, essential    -  Primary ICD-10-CM: I10 
ICD-9-CM: 401.9 Cervical spinal stenosis     ICD-10-CM: M48.02 
ICD-9-CM: 723.0 Hypercholesterolemia     ICD-10-CM: E78.00 ICD-9-CM: 272.0 Vitals  BP  
  
  
  
  
  
 (!) 168/97 (BP 1 Location: Left arm, BP Patient Position: Sitting) BMI and BSA Data Body Mass Index Body Surface Area 28.06 kg/m 2 1.83 m 2 Preferred Pharmacy Pharmacy Name Phone [facility] [address] [place] [address] [phone] Your Updated Medication List  
  
   
This list is accurate as of: 3/2/17 10:54 AM.  Always use your most recent med list.  
  
  
  
  
 ALEVE 220 mg Cap Generic drug:  naproxen sodium Take  by mouth. amLODIPine 5 mg tablet Commonly known as:  Binta Dart[place] Take 1 Tab by mouth daily. atorvastatin 20 mg tablet Commonly known as:  LIPITOR  
TAKE ONE TABLET BY MOUTH ONCE DAILY  
  
 cholecalciferol 1,000 unit Cap Commonly known as:  VITAMIN D3 Take  by mouth daily. furosemide 40 mg tablet Commonly known as:  LASIX TAKE ONE TABLET BY MOUTH ONCE DAILY losartan 100 mg tablet Commonly known as:  COZAAR  
TAKE ONE TABLET BY MOUTH ONCE DAILY  
  
 magnesium oxide 400 mg tablet Commonly known as:  MAG-OX Take 1 Tab by mouth daily. MIRALAX 17 gram/dose powder Generic drug:  polyethylene glycol Take 17 g by mouth daily. Shayy Bone Generic drug:  lactose-reduced food with fibr Take  by mouth. Follow-up Instructions Return in about 5 months (around 8/2/2017) for HTN, neck pain, chol. To-Do List   
 08/02/2017 Lab:  LIPID PANEL   
  
 08/02/2017 Lab:  METABOLIC PANEL, COMPREHENSIVE Patient Instructions DASH Diet: Care Instructions Your Care Instructions The DASH diet is an eating plan that can help lower your blood pressure. DASH stands for Dietary Approaches to Stop Hypertension. Hypertension is high blood pressure. The DASH diet focuses on eating foods that are high in calcium, potassium, and magnesium. These nutrients can lower blood pressure.  The foods that are highest in these nutrients are fruits, vegetables, low-fat dairy products, nuts, seeds, and legumes. But taking calcium, potassium, and magnesium supplements instead of eating foods that are high in those nutrients does not have the same effect. The DASH diet also includes whole grains, fish, and poultry. The DASH diet is one of several lifestyle changes your doctor may recommend to lower your high blood pressure. Your doctor may also want you to decrease the amount of sodium in your diet. Lowering sodium while following the DASH diet can lower blood pressure even further than just the DASH diet alone. Follow-up care is a key part of your treatment and safety. Be sure to make and go to all appointments, and call your doctor if you are having problems. It's also a good idea to know your test results and keep a list of the medicines you take. How can you care for yourself at home? Following the DASH diet · Eat 4 to 5 servings of fruit each day. A serving is 1 medium-sized piece of fruit, ½ cup chopped or canned fruit, 1/4 cup dried fruit, or 4 ounces (½ cup) of fruit juice. Choose fruit more often than fruit juice. · Eat 4 to 5 servings of vegetables each day. A serving is 1 cup of lettuce or raw leafy vegetables, ½ cup of chopped or cooked vegetables, or 4 ounces (½ cup) of vegetable juice. Choose vegetables more often than vegetable juice. · Get 2 to 3 servings of low-fat and fat-free dairy each day. A serving is 8 ounces of milk, 1 cup of yogurt, or 1 ½ ounces of cheese. · Eat 6 to 8 servings of grains each day. A serving is 1 slice of bread, 1 ounce of dry cereal, or ½ cup of cooked rice, pasta, or cooked cereal. Try to choose whole-grain products as much as possible. · Limit lean meat, poultry, and fish to 2 servings each day. A serving is 3 ounces, about the size of a deck of cards.  
· Eat 4 to 5 servings of nuts, seeds, and legumes (cooked dried beans, lentils, and split peas) each week. A serving is 1/3 cup of nuts, 2 tablespoons of seeds, or ½ cup of cooked beans or peas. · Limit fats and oils to 2 to 3 servings each day. A serving is 1 teaspoon of vegetable oil or 2 tablespoons of salad dressing. · Limit sweets and added sugars to 5 servings or less a week. A serving is 1 tablespoon jelly or jam, ½ cup sorbet, or 1 cup of lemonade. · Eat less than 2,300 milligrams (mg) of sodium a day. If you limit your sodium to 1,500 mg a day, you can lower your blood pressure even more. Tips for success · Start small. Do not try to make dramatic changes to your diet all at once. You might feel that you are missing out on your favorite foods and then be more likely to not follow the plan. Make small changes, and stick with them. Once those changes become habit, add a few more changes. · Try some of the following: ¨ Make it a goal to eat a fruit or vegetable at every meal and at snacks. This will make it easy to get the recommended amount of fruits and vegetables each day. ¨ Try yogurt topped with fruit and nuts for a snack or healthy dessert. ¨ Add lettuce, tomato, cucumber, and onion to sandwiches. ¨ Combine a ready-made pizza crust with low-fat mozzarella cheese and lots of vegetable toppings. Try using tomatoes, squash, spinach, broccoli, carrots, cauliflower, and onions. ¨ Have a variety of cut-up vegetables with a low-fat dip as an appetizer instead of chips and dip. ¨ Sprinkle sunflower seeds or chopped almonds over salads. Or try adding chopped walnuts or almonds to cooked vegetables. ¨ Try some vegetarian meals using beans and peas. Add garbanzo or kidney beans to salads. Make burritos and tacos with mashed angela beans or black beans. Where can you learn more? Go to http://hua-skip.info/. Enter G030 in the search box to learn more about \"DASH Diet: Care Instructions. \" Current as of: March 23, 2016 Content Version: 11.1 © 7048-3466 [employer]wise[employer]. Care instructions adapted under license by [facility] (which disclaims liability or warranty for this information). If you have questions about a medical condition or this instruction, always ask your healthcare professional. [address] any warranty or liability for your use of this information. Introducing [facility]! Elina Maloney introduces B2Brev patient portal. Now you can access parts of your medical record, email your doctor's office, and request medication refills online. 1. In your internet browser, go to [email or web address] 2. Click on the First Time User? Click Here link in the Sign In box. You will see the New Member Sign Up page. 3. Enter your B[employer]Brev Access Code exactly as it appears below. You will not need to use this code after youve completed the sign-up process. If you do not sign up before the expiration date, you must request a new code. · [employer]rev Access Code: [email or web address] Expires: 5/31/2017 10:54 AM 
 
4. Enter the last four digits of your Social Security Number (xxxx) and Date of Birth (mm/dd/yyyy) as indicated and click Submit. You will be taken to the next sign-up page. 5. Create a B2Brev ID. This will be your B[employer]Brev login ID and cannot be changed, so think of one that is secure and easy to remember. 6. Create a B[employer]Brev password. You can change your password at any time. 7. Enter your Password Reset Question and Answer. This can be used at a later time if you forget your password. 8. Enter your e-mail address. You will receive e-mail notification when new information is available in [address] 9. Click Sign Up. You can now view and download portions of your medical record. 10. Click the Download Summary menu link to download a portable copy of your medical information.  
 
If you have questions, please visit the Frequently Asked Questions section of the Taskforce. Remember, [employer] is NOT to be used for urgent needs. For medical emergencies, dial 911. Now available from your iPhone and Android! Please provide this summary of care documentation to your next provider. Your primary care clinician is listed as Dalia Garcia. If you have any questions after today's visit, please call [phone].

## 2017-03-02 NOTE — PATIENT INSTRUCTIONS

## 2017-03-02 NOTE — PROGRESS NOTES
Reviewed record In preparation for visit and have obtained necessary documentation. Advanced directive / living will on file. 1. Have you been to the ER, urgent care clinic or hospitalized since your last visit? No     2. Have you seen or consulted any other health care providers outside of the [address] [place] [address] since your last visit? Include any pap smears or colon screening.  No    Health Maintenance reviewed:

## 2017-05-08 ENCOUNTER — HOSPITAL ENCOUNTER (OUTPATIENT)
Dept: MAMMOGRAPHY | Age: 82
Discharge: HOME | End: 2017-05-08
Attending: INTERNAL MEDICINE
Payer: MEDICARE

## 2017-05-08 DIAGNOSIS — Z12.31 VISIT FOR SCREENING MAMMOGRAM: ICD-10-CM

## 2017-05-08 PROCEDURE — 77063 BREAST TOMOSYNTHESIS BI: CPT

## 2017-07-20 LAB
ALBUMIN SERPL-MCNC: 4.4 G/DL (ref 3.5–4.7)
ALBUMIN/GLOB SERPL: 1.6 {RATIO} (ref 1.2–2.2)
ALP SERPL-CCNC: 107 IU/L (ref 39–117)
ALT SERPL-CCNC: 22 IU/L (ref 0–32)
AST SERPL-CCNC: 28 IU/L (ref 0–40)
BILIRUB SERPL-MCNC: 0.4 MG/DL (ref 0–1.2)
BUN SERPL-MCNC: 19 MG/DL (ref 8–27)
BUN/CREAT SERPL: 24 (ref 12–28)
CALCIUM SERPL-MCNC: 10.2 MG/DL (ref 8.7–10.3)
CHLORIDE SERPL-SCNC: 100 MMOL/L (ref 96–106)
CHOLEST SERPL-MCNC: 148 MG/DL (ref 100–199)
CO2 SERPL-SCNC: 27 MMOL/L (ref 18–29)
CREAT SERPL-MCNC: 0.79 MG/DL (ref 0.57–1)
GLOBULIN SER CALC-MCNC: 2.7 G/DL (ref 1.5–4.5)
GLUCOSE SERPL-MCNC: 101 MG/DL (ref 65–99)
HDLC SERPL-MCNC: 43 MG/DL
INTERPRETATION, 910389: NORMAL
LDLC SERPL CALC-MCNC: 82 MG/DL (ref 0–99)
POTASSIUM SERPL-SCNC: 4 MMOL/L (ref 3.5–5.2)
PROT SERPL-MCNC: 7.1 G/DL (ref 6–8.5)
SODIUM SERPL-SCNC: 142 MMOL/L (ref 134–144)
TRIGL SERPL-MCNC: 115 MG/DL (ref 0–149)
VLDLC SERPL CALC-MCNC: 23 MG/DL (ref 5–40)

## 2017-08-11 ENCOUNTER — OFFICE VISIT (OUTPATIENT)
Dept: INTERNAL MEDICINE CLINIC | Age: 82
End: 2017-08-11

## 2017-08-11 VITALS
HEIGHT: 64 IN | TEMPERATURE: 96.4 F | BODY MASS INDEX: 27.66 KG/M2 | HEART RATE: 87 BPM | WEIGHT: 162 LBS | OXYGEN SATURATION: 97 % | RESPIRATION RATE: 14 BRPM | DIASTOLIC BLOOD PRESSURE: 101 MMHG | SYSTOLIC BLOOD PRESSURE: 169 MMHG

## 2017-08-11 DIAGNOSIS — I10 HYPERTENSION, ESSENTIAL: Primary | ICD-10-CM

## 2017-08-11 DIAGNOSIS — E78.00 HYPERCHOLESTEROLEMIA: ICD-10-CM

## 2017-08-11 DIAGNOSIS — M50.90 CERVICAL NECK PAIN WITH EVIDENCE OF DISC DISEASE: ICD-10-CM

## 2017-08-11 DIAGNOSIS — R73.01 IMPAIRED FASTING GLUCOSE: ICD-10-CM

## 2017-08-11 RX ORDER — RANITIDINE 150 MG/1
TABLET, FILM COATED ORAL
COMMUNITY
Start: 2017-05-17 | End: 2017-09-11 | Stop reason: SDUPTHER

## 2017-08-11 NOTE — PROGRESS NOTES
HISTORY OF PRESENT ILLNESS  Marge Ross is a 80 y.o. female. HPI  She presents for follow up of hypertension and hyperlipidemia. Diet and Lifestyle: generally follows a low fat low cholesterol diet, generally follows a low sodium diet, exercises sporadically, nonsmoker  Medication compliance: compliant all of the time  Medication side effects: none  Home BP Monitoring:  is borderline controlled at home, ranging 133-149/80-86  Cardiovascular ROS:  She complains of lower extremity edema. (on left only)    She denies palpitations, orthopnea, exertional chest pressure/discomfort, claudication, dyspnea on exertion, dizziness       She presents for follow up of chronic left neck pain into occipital area and ear, off and on for years, but worse in past 6 months or so. She has had physical therapy, dry needling, foraminal injections, all without relief. Massage therapy has helped more than anything else. She goes weekly. ROM has improved able to read and knit now. Pain is manageble. She saw Dr Lita Hooker about possible surgery. His opinion was that the severe cervical stenosis was a bit over called. He thinks given multilevel disease, surgery would only trade one neck pain for another. Pain quality is described as burning and severity at worst is  6/10 and at least is 3/10. Pain is present worse in the morning. Tramadol does not help. She has had side effects from duloxetine. She is resigned to living with the pain.     Patient Active Problem List   Diagnosis Code    Hypertension, essential I10    Plantar fasciitis M72.2    Spinal stenosis M48.00    Cervical neck pain with evidence of disc disease M50.90    GERD (gastroesophageal reflux disease) K21.9    Rotator cuff rupture M75.100    Tubular adenoma of colon D12.6    Advance directive on file Z78.9    Osteopenia with high risk of fracture M85.80    Hypercholesterolemia E78.00     Past Medical History:   Diagnosis Date    GERD (gastroesophageal reflux disease)     Hypercholesterolemia     Hypertension      Past Surgical History:   Procedure Laterality Date    ABDOMEN SURGERY PROC UNLISTED  2005    Laproscopic colon polyp excision Dr. Willie Mcfarland      Age 25    HX HEENT Bilateral     cataracts    HX ORTHOPAEDIC  1996    Lumbar laminectomy    MA COLSC FLX W/RMVL OF TUMOR POLYP LESION SNARE TQ  7/18/2014          Social History     Social History    Marital status:      Spouse name: N/A    Number of children: N/A    Years of education: N/A     Social History Main Topics    Smoking status: Never Smoker    Smokeless tobacco: Never Used    Alcohol use None    Drug use: None    Sexual activity: Not Asked     Other Topics Concern    None     Social History Narrative     Family History   Problem Relation Age of Onset    Heart Disease Mother     Stroke Mother     Cancer Father     Cancer Brother     Heart Disease Brother     Cancer Brother      Allergies   Allergen Reactions    Contrast Agent [Iodine] Hives    Penicillins Hives    Bystolic [Nebivolol] Other (comments)     abd pain    Cardizem [Diltiazem Hcl] Rash    Cardura [Doxazosin] Unknown (comments)    Codeine Nausea Only    Cymbalta [Duloxetine] Other (comments)     Abdominal pain, anxiety    Gabapentin Other (comments)     Made her feel crazy    Prinivil [Lisinopril] Unknown (comments)    Tekturna [Aliskiren] Other (comments)     abd cramps    Tenormin [Atenolol] Unknown (comments)     Current Outpatient Prescriptions   Medication Sig Dispense Refill    raNITIdine (ZANTAC) 150 mg tablet       furosemide (LASIX) 40 mg tablet TAKE ONE TABLET BY MOUTH ONCE DAILY 90 Tab 1    naproxen sodium (ALEVE) 220 mg cap Take  by mouth.  lactose-reduced food with fibr (QUINOA-KALE-HEMP) liqd Take  by mouth.  amLODIPine (NORVASC) 5 mg tablet Take 1 Tab by mouth daily.  90 Tab 3    atorvastatin (LIPITOR) 20 mg tablet TAKE ONE TABLET BY MOUTH ONCE DAILY 90 Tab 3  losartan (COZAAR) 100 mg tablet TAKE ONE TABLET BY MOUTH ONCE DAILY 90 Tab 3    cholecalciferol (VITAMIN D3) 1,000 unit cap Take  by mouth daily.  magnesium oxide (MAG-OX) 400 mg tablet Take 1 Tab by mouth daily. 30 Tab 12    polyethylene glycol (MIRALAX) 17 gram/dose powder Take 17 g by mouth daily. Review of Systems   Constitutional: Negative for malaise/fatigue and weight loss. Gastrointestinal: Negative for constipation, diarrhea and heartburn. Musculoskeletal: Positive for back pain (pain in upper back when standing at sink). Negative for joint pain. Neurological: Negative for dizziness, tingling and focal weakness. Visit Vitals    BP (!) 169/101 (BP 1 Location: Left arm, BP Patient Position: Sitting)    Pulse 87    Temp 96.4 °F (35.8 °C) (Oral)    Resp 14    Ht 5' 4\" (1.626 m)    Wt 162 lb (73.5 kg)    SpO2 97%    BMI 27.81 kg/m2     Physical Exam   Constitutional: She is oriented to person, place, and time. She appears well-developed and well-nourished. HENT:   Head: Normocephalic and atraumatic. Eyes: Conjunctivae are normal. Pupils are equal, round, and reactive to light. Neck: Neck supple. Carotid bruit is not present. No thyromegaly present. Cardiovascular: Normal rate, regular rhythm and normal heart sounds. PMI is not displaced. Exam reveals no gallop. No murmur heard. Pulses:       Dorsalis pedis pulses are 2+ on the right side, and 2+ on the left side. Posterior tibial pulses are 2+ on the right side, and 2+ on the left side. Pulmonary/Chest: Effort normal. She has no wheezes. She has no rhonchi. She has no rales. Abdominal: Soft. Normal appearance. She exhibits no abdominal bruit and no mass. There is no hepatosplenomegaly. There is no tenderness. Musculoskeletal: She exhibits no edema. Lymphadenopathy:     She has no cervical adenopathy. Right: No supraclavicular adenopathy present.         Left: No supraclavicular adenopathy present. Neurological: She is alert and oriented to person, place, and time. No sensory deficit. Skin: Skin is warm, dry and intact. No rash noted. Psychiatric: She has a normal mood and affect. Her behavior is normal.   Nursing note and vitals reviewed. Results for orders placed or performed in visit on 71/06/30   METABOLIC PANEL, COMPREHENSIVE   Result Value Ref Range    Glucose 101 (H) 65 - 99 mg/dL    BUN 19 8 - 27 mg/dL    Creatinine 0.79 0.57 - 1.00 mg/dL    GFR est non-AA 69 >59 mL/min/1.73    GFR est AA 80 >59 mL/min/1.73    BUN/Creatinine ratio 24 12 - 28    Sodium 142 134 - 144 mmol/L    Potassium 4.0 3.5 - 5.2 mmol/L    Chloride 100 96 - 106 mmol/L    CO2 27 18 - 29 mmol/L    Calcium 10.2 8.7 - 10.3 mg/dL    Protein, total 7.1 6.0 - 8.5 g/dL    Albumin 4.4 3.5 - 4.7 g/dL    GLOBULIN, TOTAL 2.7 1.5 - 4.5 g/dL    A-G Ratio 1.6 1.2 - 2.2    Bilirubin, total 0.4 0.0 - 1.2 mg/dL    Alk. phosphatase 107 39 - 117 IU/L    AST (SGOT) 28 0 - 40 IU/L    ALT (SGPT) 22 0 - 32 IU/L   LIPID PANEL   Result Value Ref Range    Cholesterol, total 148 100 - 199 mg/dL    Triglyceride 115 0 - 149 mg/dL    HDL Cholesterol 43 >39 mg/dL    VLDL, calculated 23 5 - 40 mg/dL    LDL, calculated 82 0 - 99 mg/dL   CVD REPORT   Result Value Ref Range    INTERPRETATION Note        ASSESSMENT and PLAN    ICD-10-CM ICD-9-CM    1. Hypertension, essential I10 401.9    2. Hypercholesterolemia E78.00 272.0    3. Cervical neck pain with evidence of disc disease M50.90 722.91    4. Impaired fasting glucose T63.31 509.03 METABOLIC PANEL, BASIC     Diagnoses and all orders for this visit:    1. Hypertension, essential  She has significant element of white coat hypertension, but the trend for her blood pressure is upward. She does not want more medication, and home blood pressures are only slightly high. 2. Hypercholesterolemia  Hyperlipidemia is controlled     3.  Cervical neck pain with evidence of disc disease  Continue massage therapy    4. Impaired fasting glucose  -     METABOLIC PANEL, BASIC; Future      Follow-up Disposition:  Return in about 6 months (around 2/11/2018) for HTN, chol .  lab results and schedule of future lab studies reviewed with patient  reviewed diet, exercise and weight control  cardiovascular risk and specific lipid/LDL goals reviewed  I have discussed the diagnosis with the patient and the intended plan as seen in the above orders. Patient is in agreement. The patient has received an after-visit summary and questions were answered concerning future plans. I have discussed medication side effects and warnings with the patient as well.

## 2017-08-11 NOTE — PROGRESS NOTES
Reviewed record In preparation for visit and have obtained necessary documentation. Advanced directive / living will on file. 1. Have you been to the ER, urgent care clinic or hospitalized since your last visit? No     2. Have you seen or consulted any other health care providers outside of the [address] [place] [address] since your last visit? Include any pap smears or colon screening. mammogram    Vitals reviewed with provider.     Health Maintenance reviewed:

## 2017-08-11 NOTE — PATIENT INSTRUCTIONS
Office visit in 6 months with fasting lab work a week before visit. DASH Diet: Care Instructions  Your Care Instructions  The DASH diet is an eating plan that can help lower your blood pressure. DASH stands for Dietary Approaches to Stop Hypertension. Hypertension is high blood pressure. The DASH diet focuses on eating foods that are high in calcium, potassium, and magnesium. These nutrients can lower blood pressure. The foods that are highest in these nutrients are fruits, vegetables, low-fat dairy products, nuts, seeds, and legumes. But taking calcium, potassium, and magnesium supplements instead of eating foods that are high in those nutrients does not have the same effect. The DASH diet also includes whole grains, fish, and poultry. The DASH diet is one of several lifestyle changes your doctor may recommend to lower your high blood pressure. Your doctor may also want you to decrease the amount of sodium in your diet. Lowering sodium while following the DASH diet can lower blood pressure even further than just the DASH diet alone. Follow-up care is a key part of your treatment and safety. Be sure to make and go to all appointments, and call your doctor if you are having problems. It's also a good idea to know your test results and keep a list of the medicines you take. How can you care for yourself at home? Following the DASH diet  · Eat 4 to 5 servings of fruit each day. A serving is 1 medium-sized piece of fruit, ½ cup chopped or canned fruit, 1/4 cup dried fruit, or 4 ounces (½ cup) of fruit juice. Choose fruit more often than fruit juice. · Eat 4 to 5 servings of vegetables each day. A serving is 1 cup of lettuce or raw leafy vegetables, ½ cup of chopped or cooked vegetables, or 4 ounces (½ cup) of vegetable juice. Choose vegetables more often than vegetable juice. · Get 2 to 3 servings of low-fat and fat-free dairy each day.  A serving is 8 ounces of milk, 1 cup of yogurt, or 1 ½ ounces of cheese. · Eat 6 to 8 servings of grains each day. A serving is 1 slice of bread, 1 ounce of dry cereal, or ½ cup of cooked rice, pasta, or cooked cereal. Try to choose whole-grain products as much as possible. · Limit lean meat, poultry, and fish to 2 servings each day. A serving is 3 ounces, about the size of a deck of cards. · Eat 4 to 5 servings of nuts, seeds, and legumes (cooked dried beans, lentils, and split peas) each week. A serving is 1/3 cup of nuts, 2 tablespoons of seeds, or ½ cup of cooked beans or peas. · Limit fats and oils to 2 to 3 servings each day. A serving is 1 teaspoon of vegetable oil or 2 tablespoons of salad dressing. · Limit sweets and added sugars to 5 servings or less a week. A serving is 1 tablespoon jelly or jam, ½ cup sorbet, or 1 cup of lemonade. · Eat less than 2,300 milligrams (mg) of sodium a day. If you limit your sodium to 1,500 mg a day, you can lower your blood pressure even more. Tips for success  · Start small. Do not try to make dramatic changes to your diet all at once. You might feel that you are missing out on your favorite foods and then be more likely to not follow the plan. Make small changes, and stick with them. Once those changes become habit, add a few more changes. · Try some of the following:  ¨ Make it a goal to eat a fruit or vegetable at every meal and at snacks. This will make it easy to get the recommended amount of fruits and vegetables each day. ¨ Try yogurt topped with fruit and nuts for a snack or healthy dessert. ¨ Add lettuce, tomato, cucumber, and onion to sandwiches. ¨ Combine a ready-made pizza crust with low-fat mozzarella cheese and lots of vegetable toppings. Try using tomatoes, squash, spinach, broccoli, carrots, cauliflower, and onions. ¨ Have a variety of cut-up vegetables with a low-fat dip as an appetizer instead of chips and dip. ¨ Sprinkle sunflower seeds or chopped almonds over salads.  Or try adding chopped walnuts or almonds to cooked vegetables. ¨ Try some vegetarian meals using beans and peas. Add garbanzo or kidney beans to salads. Make burritos and tacos with mashed angela beans or black beans. Where can you learn more? Go to http://hua-skip.info/. Enter O795 in the search box to learn more about \"DASH Diet: Care Instructions. \"  Current as of: April 3, 2017  Content Version: 11.3  © 7719-9372 [employer]. Care instructions adapted under license by [employer] (which disclaims liability or warranty for this information). If you have questions about a medical condition or this instruction, always ask your healthcare professional. [address] any warranty or liability for your use of this information.

## 2017-08-11 NOTE — MR AVS SNAPSHOT
Visit Information Date & Time Provider Department Dept. Phone Encounter #  
 8/11/2017  1:30 PM Ricco Delaney MD Tomas Gupta [phone] [id number] Follow-up Instructions Return in about 6 months (around 2/11/2018) for HTN, chol . Upcoming Health Maintenance Date Due INFLUENZA AGE 9 TO ADULT 8/1/2017 MEDICARE YEARLY EXAM 1/20/2018 GLAUCOMA SCREENING Q2Y 3/1/2018 DTaP/Tdap/Td series (2 - Td) 7/16/2023 Allergies as of 8/11/2017  Review Complete On: 8/11/2017 By: Ricco Delaney MD  
  
 Severity Noted Reaction Type Reactions Contrast Agent [Iodine] High 03/01/2010    Hives Penicillins High 03/01/2010    Hives Bystolic [Nebivolol]  00/45/7428    Other (comments)  
 abd pain  
 Cardizem [Diltiazem Hcl]  03/01/2010    Rash Cardura [Doxazosin]  03/01/2010    Unknown (comments) Codeine  03/01/2010    Nausea Only Cymbalta [Duloxetine]  01/19/2017    Other (comments) Abdominal pain, anxiety Gabapentin  03/02/2017    Other (comments) Made her feel crazy Prinivil [Lisinopril]  03/01/2010    Unknown (comments) Tekturna [Aliskiren]  03/01/2010    Other (comments)  
 abd cramps Tenormin [Atenolol]  03/01/2010    Unknown (comments) Current Immunizations  Reviewed on 8/11/2017 Name Date Influenza High Dose Vaccine PF 12/12/2016, 10/26/2015, 10/3/2014 Influenza Vaccine 9/17/2013 Pneumococcal Conjugate (PCV-13) 4/3/2015 TD Vaccine 6/1/2007 Tdap 7/16/2013 ZZZ-RETIRED (DO NOT USE) Pneumococcal Vaccine (Unspecified Type) 10/1/2002 Zoster 6/30/2009 Reviewed by Kimberley Hodgson LPN on 4/86/7529 at  1:17 PM  
You Were Diagnosed With   
  
 Codes Comments Hypertension, essential    -  Primary ICD-10-CM: I10 
ICD-9-CM: 401.9 Hypercholesterolemia     ICD-10-CM: E78.00 ICD-9-CM: 272.0 Cervical neck pain with evidence of disc disease     ICD-10-CM: M50.90 ICD-9-CM: 722.91   
 Impaired fasting glucose     ICD-10-CM: R73.01 
ICD-9-CM: 790.21 Vitals BP Pulse Temp Resp Height(growth percentile) Weight(growth percentile) (!) 169/101 (BP 1 Location: Left arm, BP Patient Position: Sitting) 87 96.4 °F (35.8 °C) (Oral) 14 5' 4\" (1.626 m) 162 lb (73.5 kg) SpO2 BMI OB Status Smoking Status 97% 27.81 kg/m2 Postmenopausal Never Smoker BMI and BSA Data Body Mass Index Body Surface Area  
 27.81 kg/m 2 1.82 m 2 Preferred Pharmacy Pharmacy Name Phone [facility] [address] [place] [address] [phone] Your Updated Medication List  
  
   
This list is accurate as of: 8/11/17  1:51 PM.  Always use your most recent med list.  
  
  
  
  
 ALEVE 220 mg Cap Generic drug:  naproxen sodium Take  by mouth. amLODIPine 5 mg tablet Commonly known as:  Sherrye Acron Take 1 Tab by mouth daily. atorvastatin 20 mg tablet Commonly known as:  LIPITOR  
TAKE ONE TABLET BY MOUTH ONCE DAILY  
  
 cholecalciferol 1,000 unit Cap Commonly known as:  VITAMIN D3 Take  by mouth daily. furosemide 40 mg tablet Commonly known as:  LASIX TAKE ONE TABLET BY MOUTH ONCE DAILY losartan 100 mg tablet Commonly known as:  COZAAR  
TAKE ONE TABLET BY MOUTH ONCE DAILY  
  
 magnesium oxide 400 mg tablet Commonly known as:  MAG-OX Take 1 Tab by mouth daily. MIRALAX 17 gram/dose powder Generic drug:  polyethylene glycol Take 17 g by mouth daily. Amada Jamal Generic drug:  lactose-reduced food with fibr Take  by mouth. raNITIdine 150 mg tablet Commonly known as:  ZANTAC Follow-up Instructions Return in about 6 months (around 2/11/2018) for HTN, chol . To-Do List   
 02/11/2018 Lab:  METABOLIC PANEL, BASIC Patient Instructions Office visit in 6 months with fasting lab work a week before visit. DASH Diet: Care Instructions Your Care Instructions The DASH diet is an eating plan that can help lower your blood pressure. DASH stands for Dietary Approaches to Stop Hypertension. Hypertension is high blood pressure. The DASH diet focuses on eating foods that are high in calcium, potassium, and magnesium. These nutrients can lower blood pressure. The foods that are highest in these nutrients are fruits, vegetables, low-fat dairy products, nuts, seeds, and legumes. But taking calcium, potassium, and magnesium supplements instead of eating foods that are high in those nutrients does not have the same effect. The DASH diet also includes whole grains, fish, and poultry. The DASH diet is one of several lifestyle changes your doctor may recommend to lower your high blood pressure. Your doctor may also want you to decrease the amount of sodium in your diet. Lowering sodium while following the DASH diet can lower blood pressure even further than just the DASH diet alone. Follow-up care is a key part of your treatment and safety. Be sure to make and go to all appointments, and call your doctor if you are having problems. It's also a good idea to know your test results and keep a list of the medicines you take. How can you care for yourself at home? Following the DASH diet · Eat 4 to 5 servings of fruit each day. A serving is 1 medium-sized piece of fruit, ½ cup chopped or canned fruit, 1/4 cup dried fruit, or 4 ounces (½ cup) of fruit juice. Choose fruit more often than fruit juice. · Eat 4 to 5 servings of vegetables each day. A serving is 1 cup of lettuce or raw leafy vegetables, ½ cup of chopped or cooked vegetables, or 4 ounces (½ cup) of vegetable juice. Choose vegetables more often than vegetable juice. · Get 2 to 3 servings of low-fat and fat-free dairy each day. A serving is 8 ounces of milk, 1 cup of yogurt, or 1 ½ ounces of cheese. · Eat 6 to 8 servings of grains each day.  A serving is 1 slice of bread, 1 ounce of dry cereal, or ½ cup of cooked rice, pasta, or cooked cereal. Try to choose whole-grain products as much as possible. · Limit lean meat, poultry, and fish to 2 servings each day. A serving is 3 ounces, about the size of a deck of cards. · Eat 4 to 5 servings of nuts, seeds, and legumes (cooked dried beans, lentils, and split peas) each week. A serving is 1/3 cup of nuts, 2 tablespoons of seeds, or ½ cup of cooked beans or peas. · Limit fats and oils to 2 to 3 servings each day. A serving is 1 teaspoon of vegetable oil or 2 tablespoons of salad dressing. · Limit sweets and added sugars to 5 servings or less a week. A serving is 1 tablespoon jelly or jam, ½ cup sorbet, or 1 cup of lemonade. · Eat less than 2,300 milligrams (mg) of sodium a day. If you limit your sodium to 1,500 mg a day, you can lower your blood pressure even more. Tips for success · Start small. Do not try to make dramatic changes to your diet all at once. You might feel that you are missing out on your favorite foods and then be more likely to not follow the plan. Make small changes, and stick with them. Once those changes become habit, add a few more changes. · Try some of the following: ¨ Make it a goal to eat a fruit or vegetable at every meal and at snacks. This will make it easy to get the recommended amount of fruits and vegetables each day. ¨ Try yogurt topped with fruit and nuts for a snack or healthy dessert. ¨ Add lettuce, tomato, cucumber, and onion to sandwiches. ¨ Combine a ready-made pizza crust with low-fat mozzarella cheese and lots of vegetable toppings. Try using tomatoes, squash, spinach, broccoli, carrots, cauliflower, and onions. ¨ Have a variety of cut-up vegetables with a low-fat dip as an appetizer instead of chips and dip. ¨ Sprinkle sunflower seeds or chopped almonds over salads. Or try adding chopped walnuts or almonds to cooked vegetables. ¨ Try some vegetarian meals using beans and peas. Add garbanzo or kidney beans to salads. Make burritos and tacos with mashed angela beans or black beans. Where can you learn more? Go to http://hua-skip.info/. Enter V321 in the search box to learn more about \"DASH Diet: Care Instructions. \" Current as of: April 3, 2017 Content Version: 11.3 © 5570-0333 [employer]. Care instructions adapted under license by [employer] (which disclaims liability or warranty for this information). If you have questions about a medical condition or this instruction, always ask your healthcare professional. [address] any warranty or liability for your use of this information. Introducing [facility]! [facility] introduces [facility] patient portal. Now you can access parts of your medical record, email your doctor's office, and request medication refills online. 1. In your internet browser, go to [email or web address] 2. Click on the First Time User? Click Here link in the Sign In box. You will see the New Member Sign Up page. 3. Enter your [facility] Access Code exactly as it appears below. You will not need to use this code after youve completed the sign-up process. If you do not sign up before the expiration date, you must request a new code. · [facility] Access Code: [email or web address] Expires: 11/9/2017  1:48 PM 
 
4. Enter the last four digits of your Social Security Number (xxxx) and Date of Birth (mm/dd/yyyy) as indicated and click Submit. You will be taken to the next sign-up page. 5. Create a [facility] ID. This will be your [facility] login ID and cannot be changed, so think of one that is secure and easy to remember. 6. Create a [facility] password. You can change your password at any time. 7. Enter your Password Reset Question and Answer. This can be used at a later time if you forget your password. 8. Enter your e-mail address. You will receive e-mail notification when new information is available in [address] 9. Click Sign Up. You can now view and download portions of your medical record. 10. Click the Download Summary menu link to download a portable copy of your medical information. If you have questions, please visit the Frequently Asked Questions section of the [employer] website. Remember, [employer] is NOT to be used for urgent needs. For medical emergencies, dial 911. Now available from your iPhone and Android! Please provide this summary of care documentation to your next provider. Your primary care clinician is listed as Jovany Azar. If you have any questions after today's visit, please call [phone].

## 2017-09-11 RX ORDER — LOSARTAN POTASSIUM 100 MG/1
TABLET ORAL
Qty: 90 TAB | Refills: 3 | Status: SHIPPED | OUTPATIENT
Start: 2017-09-11 | End: 2018-10-31 | Stop reason: SDUPTHER

## 2017-09-11 RX ORDER — FUROSEMIDE 40 MG/1
TABLET ORAL
Qty: 90 TAB | Refills: 1 | Status: SHIPPED | OUTPATIENT
Start: 2017-09-11 | End: 2018-04-03 | Stop reason: SDUPTHER

## 2017-09-11 RX ORDER — RANITIDINE 150 MG/1
TABLET, FILM COATED ORAL
Qty: 180 TAB | Refills: 3 | Status: SHIPPED | OUTPATIENT
Start: 2017-09-11 | End: 2018-06-04

## 2017-12-11 DIAGNOSIS — I10 HYPERTENSION, ESSENTIAL: ICD-10-CM

## 2017-12-11 RX ORDER — ATORVASTATIN CALCIUM 20 MG/1
TABLET, FILM COATED ORAL
Qty: 90 TAB | Refills: 3 | Status: SHIPPED | OUTPATIENT
Start: 2017-12-11 | End: 2019-01-07 | Stop reason: SDUPTHER

## 2017-12-11 RX ORDER — AMLODIPINE BESYLATE 5 MG/1
TABLET ORAL
Qty: 90 TAB | Refills: 3 | Status: SHIPPED | OUTPATIENT
Start: 2017-12-11 | End: 2018-04-16 | Stop reason: SDUPTHER

## 2018-02-01 LAB
BUN SERPL-MCNC: 15 MG/DL (ref 8–27)
BUN/CREAT SERPL: 19 (ref 12–28)
CALCIUM SERPL-MCNC: 9.9 MG/DL (ref 8.7–10.3)
CHLORIDE SERPL-SCNC: 102 MMOL/L (ref 96–106)
CO2 SERPL-SCNC: 23 MMOL/L (ref 18–29)
CREAT SERPL-MCNC: 0.81 MG/DL (ref 0.57–1)
GFR SERPLBLD CREATININE-BSD FMLA CKD-EPI: 67 ML/MIN/1.73
GFR SERPLBLD CREATININE-BSD FMLA CKD-EPI: 78 ML/MIN/1.73
GLUCOSE SERPL-MCNC: 99 MG/DL (ref 65–99)
POTASSIUM SERPL-SCNC: 4.3 MMOL/L (ref 3.5–5.2)
SODIUM SERPL-SCNC: 143 MMOL/L (ref 134–144)

## 2018-04-03 RX ORDER — FUROSEMIDE 40 MG/1
TABLET ORAL
Qty: 90 TAB | Refills: 1 | Status: SHIPPED | OUTPATIENT
Start: 2018-04-03 | End: 2018-07-17 | Stop reason: SDUPTHER

## 2018-04-16 ENCOUNTER — DOCUMENTATION ONLY (OUTPATIENT)
Dept: INTERNAL MEDICINE CLINIC | Facility: CLINIC | Age: 83
End: 2018-04-16

## 2018-04-16 ENCOUNTER — OFFICE VISIT (OUTPATIENT)
Dept: INTERNAL MEDICINE CLINIC | Facility: CLINIC | Age: 83
End: 2018-04-16

## 2018-04-16 VITALS
WEIGHT: 166 LBS | TEMPERATURE: 98.3 F | SYSTOLIC BLOOD PRESSURE: 177 MMHG | HEIGHT: 64 IN | RESPIRATION RATE: 20 BRPM | OXYGEN SATURATION: 95 % | DIASTOLIC BLOOD PRESSURE: 119 MMHG | BODY MASS INDEX: 28.34 KG/M2 | HEART RATE: 92 BPM

## 2018-04-16 DIAGNOSIS — E78.00 HYPERCHOLESTEROLEMIA: ICD-10-CM

## 2018-04-16 DIAGNOSIS — Z71.89 ADVANCE DIRECTIVE DISCUSSED WITH PATIENT: ICD-10-CM

## 2018-04-16 DIAGNOSIS — I10 HYPERTENSION, ESSENTIAL: ICD-10-CM

## 2018-04-16 DIAGNOSIS — Z00.00 MEDICARE ANNUAL WELLNESS VISIT, SUBSEQUENT: Primary | ICD-10-CM

## 2018-04-16 DIAGNOSIS — Z13.31 SCREENING FOR DEPRESSION: ICD-10-CM

## 2018-04-16 RX ORDER — CLONIDINE HYDROCHLORIDE 0.1 MG/1
TABLET ORAL
Qty: 30 TAB | Refills: 5 | Status: SHIPPED | OUTPATIENT
Start: 2018-04-16 | End: 2018-06-04 | Stop reason: SDUPTHER

## 2018-04-16 RX ORDER — AMLODIPINE BESYLATE 5 MG/1
5 TABLET ORAL DAILY
Qty: 90 TAB | Refills: 3
Start: 2018-04-16 | End: 2018-07-26 | Stop reason: SDUPTHER

## 2018-04-16 NOTE — MR AVS SNAPSHOT
[address] [address] [phone] Patient: Malaika Alford MRN: [id number] PKM:[id number] Visit Information Date & Time Provider Department Dept. Phone Encounter #  
 4/16/2018  3:30 PM Carson Castellanos MD [facility] Internal Medicine of [address] [place] [address] [id number] Follow-up Instructions Return in about 6 weeks (around 5/28/2018) for HTN . Upcoming Health Maintenance Date Due  
 GLAUCOMA SCREENING Q2Y 3/1/2018 MEDICARE YEARLY EXAM 3/14/2018 DTaP/Tdap/Td series (2 - Td) 7/16/2023 Allergies as of 4/16/2018  Review Complete On: 4/16/2018 By: Carson Castellanos MD  
  
 Severity Noted Reaction Type Reactions Contrast Agent [Iodine] High 03/01/2010    Hives Penicillins High 03/01/2010    Hives Bystolic [Nebivolol]  03/12/1221    Other (comments)  
 abd pain  
 Cardizem [Diltiazem Hcl]  03/01/2010    Rash Cardura [Doxazosin]  03/01/2010    Unknown (comments) Codeine  03/01/2010    Nausea Only Cymbalta [Duloxetine]  01/19/2017    Other (comments) Abdominal pain, anxiety Gabapentin  03/02/2017    Other (comments) Made her feel crazy Prinivil [Lisinopril]  03/01/2010    Unknown (comments) Tekturna [Aliskiren]  03/01/2010    Other (comments)  
 abd cramps Tenormin [Atenolol]  03/01/2010    Unknown (comments) Current Immunizations  Reviewed on 4/16/2018 Name Date Influenza High Dose Vaccine PF 12/12/2016, 10/26/2015, 10/3/2014 Influenza Vaccine 9/17/2013 Influenza Vaccine Arturo Suzanna) 11/24/2017 Pneumococcal Conjugate (PCV-13) 4/3/2015 TD Vaccine 6/1/2007 Tdap 7/16/2013 ZZZ-RETIRED (DO NOT USE) Pneumococcal Vaccine (Unspecified Type) 10/1/2002 Zoster 6/30/2009 Reviewed by Chava Finch LPN on 6/19/3165 at  3:25 PM  
 Reviewed by Chava Finch LPN on 2/92/0182 at  3:25 PM  
You Were Diagnosed With   
  
 Codes Comments Medicare annual wellness visit, subsequent    -  Primary ICD-10-CM: Z00.00 ICD-9-CM: V70.0 Advance directive discussed with patient     ICD-10-CM: Z71.89 ICD-9-CM: V65.49 Hypertension, essential     ICD-10-CM: I10 
ICD-9-CM: 401.9 Hypercholesterolemia     ICD-10-CM: E78.00 ICD-9-CM: 272.0 Screening for depression     ICD-10-CM: Z13.89 ICD-9-CM: V79.0 Vitals BP Pulse Temp Resp Height(growth percentile) Weight(growth percentile) (!) 177/119 (BP 1 Location: Left arm, BP Patient Position: Sitting) 92 98.3 °F (36.8 °C) (Oral) 20 5' 4\" (1.626 m) 166 lb (75.3 kg) SpO2 BMI OB Status Smoking Status 95% 28.49 kg/m2 Postmenopausal Never Smoker BMI and BSA Data Body Mass Index Body Surface Area  
 28.49 kg/m 2 1.84 m 2 Preferred Pharmacy Pharmacy Name Phone 500 [address] [phone] Your Updated Medication List  
  
   
This list is accurate as of 4/16/18  4:25 PM.  Always use your most recent med list.  
  
  
  
  
 ALEVE 220 mg Cap Generic drug:  naproxen sodium Take  by mouth. amLODIPine 5 mg tablet Commonly known as:  Goyo Lute Take 1 Tab by mouth daily. atorvastatin 20 mg tablet Commonly known as:  LIPITOR  
TAKE ONE TABLET BY MOUTH ONCE DAILY  
  
 cholecalciferol 1,000 unit Cap Commonly known as:  VITAMIN D3 Take  by mouth daily. cloNIDine HCl 0.1 mg tablet Commonly known as:  CATAPRES Take one po at bedtime  Indications: hypertension  
  
 furosemide 40 mg tablet Commonly known as:  LASIX TAKE ONE TABLET BY MOUTH ONCE DAILY losartan 100 mg tablet Commonly known as:  COZAAR  
TAKE ONE TABLET BY MOUTH ONCE DAILY  
  
 magnesium oxide 400 mg tablet Commonly known as:  MAG-OX Take 1 Tab by mouth daily. MIRALAX 17 gram/dose powder Generic drug:  polyethylene glycol Take 17 g by mouth daily. raNITIdine 150 mg tablet Commonly known as:  ZANTAC TAKE ONE TABLET BY MOUTH TWICE DAILY Prescriptions Sent to Pharmacy Refills  
 cloNIDine HCl (CATAPRES) 0.1 mg tablet 5 Sig: Take one po at bedtime  Indications: hypertension Class: Normal  
 Pharmacy: [facility] DR MAYANK DEL RIO [address] [place] [address] #: [phone] We Performed the Following [id number] [address] [ [facility]] Follow-up Instructions Return in about 6 weeks (around 5/28/2018) for HTN . Patient Instructions Start clonidine 0.1 mg at bedtime in addition to current medication. Return in 6 weeks for blood pressure. The best way to stay healthy is to live a healthy lifestyle. A healthy lifestyle includes regular exercise, eating a well-balanced diet, keeping a healthy weight and not smoking. Regular physical exams and screening tests are another important way to take care of yourself. Preventive exams provided by health care providers can find health problems early when treatment works best and can keep you from getting certain diseases or illnesses. Preventive services include exams, lab tests, screenings, shots, monitoring and information to help you take care of your own health. All people over 65 should have a pneumonia shot. Pneumonia shots are usually only needed once in a lifetime unless your doctor decides differently. In addition to your physical exam, some screening tests are recommended: 
 
All people over 65 should have a yearly flu shot. People over 65 are at medium to high risk for Hepatitis B. Three shots are needed for complete protection. Bone mass measurement (dexa scan) is recommended every two years. Diabetes Mellitus screening is recommended every year. Glaucoma is an eye disease caused by high pressure in the eye. An eye exam is recommended every year.   
 
Cardiovascular screening tests that check your cholesterol and other blood fat (lipid) levels are recommended every five years. Colorectal Cancer screening tests help to find pre-cancerous polyps (growths in the colon) so they can be removed before they turn into cancer. Tests ordered for screening depend on your personal and family history risk factors. Prostate Cancer Screening (annually up to age 76) Screening for breast cancer is recommended yearly with a Mammogram. 
 
Screening for cervical and vaginal cancer is recommended with a pelvic and Pap test every two years. However if you have had an abnormal pap in the past  three years or at high risk for cervical or vaginal cancer Medicare will cover a pap test and a pelvic exam every year. Here is a list of your current Health Maintenance items with a due date: 
Health Maintenance Due Topic Date Due  Glaucoma Screening   03/01/2018 Bandar Patel Annual Well Visit  03/14/2018 DASH Diet: Care Instructions Your Care Instructions The DASH diet is an eating plan that can help lower your blood pressure. DASH stands for Dietary Approaches to Stop Hypertension. Hypertension is high blood pressure. The DASH diet focuses on eating foods that are high in calcium, potassium, and magnesium. These nutrients can lower blood pressure. The foods that are highest in these nutrients are fruits, vegetables, low-fat dairy products, nuts, seeds, and legumes. But taking calcium, potassium, and magnesium supplements instead of eating foods that are high in those nutrients does not have the same effect. The DASH diet also includes whole grains, fish, and poultry. The DASH diet is one of several lifestyle changes your doctor may recommend to lower your high blood pressure. Your doctor may also want you to decrease the amount of sodium in your diet. Lowering sodium while following the DASH diet can lower blood pressure even further than just the DASH diet alone. Follow-up care is a key part of your treatment and safety.  Be sure to make and go to all appointments, and call your doctor if you are having problems. It's also a good idea to know your test results and keep a list of the medicines you take. How can you care for yourself at home? Following the DASH diet · Eat 4 to 5 servings of fruit each day. A serving is 1 medium-sized piece of fruit, ½ cup chopped or canned fruit, 1/4 cup dried fruit, or 4 ounces (½ cup) of fruit juice. Choose fruit more often than fruit juice. · Eat 4 to 5 servings of vegetables each day. A serving is 1 cup of lettuce or raw leafy vegetables, ½ cup of chopped or cooked vegetables, or 4 ounces (½ cup) of vegetable juice. Choose vegetables more often than vegetable juice. · Get 2 to 3 servings of low-fat and fat-free dairy each day. A serving is 8 ounces of milk, 1 cup of yogurt, or 1 ½ ounces of cheese. · Eat 6 to 8 servings of grains each day. A serving is 1 slice of bread, 1 ounce of dry cereal, or ½ cup of cooked rice, pasta, or cooked cereal. Try to choose whole-grain products as much as possible. · Limit lean meat, poultry, and fish to 2 servings each day. A serving is 3 ounces, about the size of a deck of cards. · Eat 4 to 5 servings of nuts, seeds, and legumes (cooked dried beans, lentils, and split peas) each week. A serving is 1/3 cup of nuts, 2 tablespoons of seeds, or ½ cup of cooked beans or peas. · Limit fats and oils to 2 to 3 servings each day. A serving is 1 teaspoon of vegetable oil or 2 tablespoons of salad dressing. · Limit sweets and added sugars to 5 servings or less a week. A serving is 1 tablespoon jelly or jam, ½ cup sorbet, or 1 cup of lemonade. · Eat less than 2,300 milligrams (mg) of sodium a day. If you limit your sodium to 1,500 mg a day, you can lower your blood pressure even more. Tips for success · Start small. Do not try to make dramatic changes to your diet all at once.  You might feel that you are missing out on your favorite foods and then be more likely to not follow the plan. Make small changes, and stick with them. Once those changes become habit, add a few more changes. · Try some of the following: ¨ Make it a goal to eat a fruit or vegetable at every meal and at snacks. This will make it easy to get the recommended amount of fruits and vegetables each day. ¨ Try yogurt topped with fruit and nuts for a snack or healthy dessert. ¨ Add lettuce, tomato, cucumber, and onion to sandwiches. ¨ Combine a ready-made pizza crust with low-fat mozzarella cheese and lots of vegetable toppings. Try using tomatoes, squash, spinach, broccoli, carrots, cauliflower, and onions. ¨ Have a variety of cut-up vegetables with a low-fat dip as an appetizer instead of chips and dip. ¨ Sprinkle sunflower seeds or chopped almonds over salads. Or try adding chopped walnuts or almonds to cooked vegetables. ¨ Try some vegetarian meals using beans and peas. Add garbanzo or kidney beans to salads. Make burritos and tacos with mashed angela beans or black beans. Where can you learn more? Go to http://hua-skip.info/. Enter Z497 in the search box to learn more about \"DASH Diet: Care Instructions. \" Current as of: September 21, 2016 Content Version: 11.4 © 9419-4002 [employer]. Care instructions adapted under license by [employer] (which disclaims liability or warranty for this information). If you have questions about a medical condition or this instruction, always ask your healthcare professional. [address] [address] any warranty or liability for your use of this information. Introducing [facility]! Mark Marroquin introduces [employer] patient portal. Now you can access parts of your medical record, email your doctor's office, and request medication refills online. 1. In your internet browser, go to [email or web address] 2. Click on the First Time User? Click Here link in the Sign In box. You will see the New Member Sign Up page. 3. Enter your [employer] Access Code exactly as it appears below. You will not need to use this code after youve completed the sign-up process. If you do not sign up before the expiration date, you must request a new code. · [employer] Access Code: [email or web address] Expires: 7/15/2018  4:24 PM 
 
4. Enter the last four digits of your Social Security Number (xxxx) and Date of Birth (mm/dd/yyyy) as indicated and click Submit. You will be taken to the next sign-up page. 5. Create a 24Symbols ID. This will be your [employer] login ID and cannot be changed, so think of one that is secure and easy to remember. 6. Create a [employer] password. You can change your password at any time. 7. Enter your Password Reset Question and Answer. This can be used at a later time if you forget your password. 8. Enter your e-mail address. You will receive e-mail notification when new information is available in [address] 9. Click Sign Up. You can now view and download portions of your medical record. 10. Click the Download Summary menu link to download a portable copy of your medical information. If you have questions, please visit the Frequently Asked Questions section of the [employer] website. Remember, [employer] is NOT to be used for urgent needs. For medical emergencies, dial 911. Now available from your iPhone and Android! Please provide this summary of care documentation to your next provider. Your primary care clinician is listed as Bharathi Zavala. If you have any questions after today's visit, please call [phone].

## 2018-04-16 NOTE — PROGRESS NOTES
This is the Subsequent Medicare Annual Wellness Exam, performed 12 months or more after the Initial AWV or the last Subsequent AWV    I have reviewed the patient's medical history in detail and updated the computerized patient record. History     Past Medical History:   Diagnosis Date    GERD (gastroesophageal reflux disease)     Hypercholesterolemia     Hypertension       Past Surgical History:   Procedure Laterality Date    ABDOMEN SURGERY PROC UNLISTED  2005    Laproscopic colon polyp excision Dr. Shayla Burciaga HX APPENDECTOMY      Age 25    HX HEENT Bilateral     cataracts    HX ORTHOPAEDIC  1996    Lumbar laminectomy    NE COLSC FLX W/RMVL OF TUMOR POLYP LESION SNARE TQ  7/18/2014          Current Outpatient Prescriptions   Medication Sig Dispense Refill    amLODIPine (NORVASC) 5 mg tablet Take 1 Tab by mouth daily. 90 Tab 3    furosemide (LASIX) 40 mg tablet TAKE ONE TABLET BY MOUTH ONCE DAILY 90 Tab 1    atorvastatin (LIPITOR) 20 mg tablet TAKE ONE TABLET BY MOUTH ONCE DAILY 90 Tab 3    raNITIdine (ZANTAC) 150 mg tablet TAKE ONE TABLET BY MOUTH TWICE DAILY 180 Tab 3    losartan (COZAAR) 100 mg tablet TAKE ONE TABLET BY MOUTH ONCE DAILY 90 Tab 3    naproxen sodium (ALEVE) 220 mg cap Take  by mouth.  cholecalciferol (VITAMIN D3) 1,000 unit cap Take  by mouth daily.  magnesium oxide (MAG-OX) 400 mg tablet Take 1 Tab by mouth daily. 30 Tab 12    polyethylene glycol (MIRALAX) 17 gram/dose powder Take 17 g by mouth daily.        Allergies   Allergen Reactions    Contrast Agent [Iodine] Hives    Penicillins Hives    Bystolic [Nebivolol] Other (comments)     abd pain    Cardizem [Diltiazem Hcl] Rash    Cardura [Doxazosin] Unknown (comments)    Codeine Nausea Only    Cymbalta [Duloxetine] Other (comments)     Abdominal pain, anxiety    Gabapentin Other (comments)     Made her feel crazy    Prinivil [Lisinopril] Unknown (comments)    Tekturna [Aliskiren] Other (comments)     abd cramps    Tenormin [Atenolol] Unknown (comments)     Family History   Problem Relation Age of Onset    Heart Disease Mother     Stroke Mother     Cancer Father     Cancer Brother     Heart Disease Brother     Cancer Brother      Social History   Substance Use Topics    Smoking status: Never Smoker    Smokeless tobacco: Never Used    Alcohol use Not on file     Patient Active Problem List   Diagnosis Code    Hypertension, essential I10    Plantar fasciitis M72.2    Spinal stenosis M48.00    Cervical neck pain with evidence of disc disease M50.90    GERD (gastroesophageal reflux disease) K21.9    Rotator cuff rupture M75.100    Tubular adenoma of colon D12.6    Advance directive on file Z78.9    Osteopenia with high risk of fracture M85.80    Hypercholesterolemia E78.00       Depression Risk Factor Screening:     PHQ over the last two weeks 4/16/2018   Little interest or pleasure in doing things Not at all   Feeling down, depressed or hopeless Not at all   Total Score PHQ 2 0     Alcohol Risk Factor Screening: You do not drink alcohol or very rarely. Functional Ability and Level of Safety:   Hearing Loss  Hearing is good. Activities of Daily Living  The home contains: handrails and grab bars  Patient does total self care    Fall Risk  Fall Risk Assessment, last 12 mths 4/16/2018   Able to walk? Yes   Fall in past 12 months? No       Abuse Screen  Patient is not abused    Cognitive Screening   Evaluation of Cognitive Function:  Has your family/caregiver stated any concerns about your memory: no  Normal  3 word recall  Immediate 3/3,  delayed 3/3     Patient Care Team   Patient Care Team:  Vince Strauss MD as PCP - General (Internal Medicine)  Padma Perez MD (Pain Management)  Merline Jules MD (Ophthalmology)    Assessment/Plan   Education and counseling provided:  Are appropriate based on today's review and evaluation    Diagnoses and all orders for this visit:    1.  Hypertension, essential  -     amLODIPine (NORVASC) 5 mg tablet; Take 1 Tab by mouth daily. 2. Hypercholesterolemia    3.  Screening for depression  -     [address] Maintenance Due   Topic Date Due    GLAUCOMA SCREENING Q2Y  03/01/2018    MEDICARE YEARLY EXAM  03/14/2018

## 2018-04-16 NOTE — PROGRESS NOTES
Alice Bob  Identified pt with two pt identifiers(name and ). Chief Complaint   Patient presents with    Blood Pressure Check    Cholesterol Problem       Reviewed record In preparation for visit and have obtained necessary documentation. Advanced directive / living will on file. 1. Have you been to the ER, urgent care clinic or hospitalized since your last visit? No     2. Have you seen or consulted any other health care providers outside of the [address] [place] [address] since your last visit? Include any pap smears or colon screening. No    Vitals reviewed with provider.     Health Maintenance reviewed:     Health Maintenance Due   Topic    GLAUCOMA SCREENING Q2Y     MEDICARE YEARLY EXAM           Wt Readings from Last 3 Encounters:   18 166 lb (75.3 kg)   17 162 lb (73.5 kg)   17 163 lb 8 oz (74.2 kg)        Temp Readings from Last 3 Encounters:   18 98.3 °F (36.8 °C) (Oral)   17 96.4 °F (35.8 °C) (Oral)   17 96.1 °F (35.6 °C) (Oral)        BP Readings from Last 3 Encounters:   18 (!) 177/119   17 (!) 169/101   17 (!) 168/97        Pulse Readings from Last 3 Encounters:   18 92   17 87   17 93        Vitals:    18 1531   BP: (!) 177/119   Pulse: 92   Resp: 20   Temp: 98.3 °F (36.8 °C)   TempSrc: Oral   SpO2: 95%   Weight: 166 lb (75.3 kg)   Height: 5' 4\" (1.626 m)   PainSc:   7   PainLoc: Back          Learning Assessment:   :       Learning Assessment 2014   PRIMARY LEARNER Patient   HIGHEST LEVEL OF EDUCATION - PRIMARY LEARNER  GRADUATED HIGH SCHOOL OR GED   BARRIERS PRIMARY LEARNER NONE   CO-LEARNER CAREGIVER No   PRIMARY LANGUAGE ENGLISH   LEARNER PREFERENCE PRIMARY DEMONSTRATION   ANSWERED BY patient   RELATIONSHIP SELF        Depression Screening:   :       PHQ over the last two weeks 2018   Little interest or pleasure in doing things Not at all   Feeling down, depressed or hopeless Not at all   Total Score PHQ 2 0        Fall Risk Assessment:   :       Fall Risk Assessment, last 12 mths 4/16/2018   Able to walk? Yes   Fall in past 12 months? No        Abuse Screening:   :       Abuse Screening Questionnaire 4/16/2018 1/19/2017 10/26/2015 8/5/2014   Do you ever feel afraid of your partner? N N N N   Are you in a relationship with someone who physically or mentally threatens you? N N N N   Is it safe for you to go home?  Y Y Y Y        ADL Screening:   :       ADL Assessment 4/3/2015   Feeding yourself No Help Needed   Getting from bed to chair No Help Needed   Getting dressed No Help Needed   Bathing or showering No Help Needed   Walk across the room (includes cane/walker) No Help Needed   Using the telphone No Help Needed   Taking your medications No Help Needed   Preparing meals No Help Needed   Managing money (expenses/bills) No Help Needed   Moderately strenuous housework (laundry) No Help Needed   Shopping for personal items (toiletries/medicines) No Help Needed   Shopping for groceries No Help Needed   Driving No Help Needed   Climbing a flight of stairs No Help Needed   Getting to places beyond walking distances No Help Needed

## 2018-04-16 NOTE — PROGRESS NOTES
HISTORY OF PRESENT ILLNESS  Carlyle Kussmaul is a 80 y.o. female. HPI  She presents for follow up of hypertension and hyperlipidemia. Diet and Lifestyle: generally follows a low fat low cholesterol diet, generally follows a low sodium diet, exercises sporadically, nonsmoker  Medication compliance: compliant all of the time  Medication side effects: leg swelling when on  Amlodipine 10 mg, not as bad on only 5 mg.    Home BP Monitorin-140's/80's  Cardiovascular ROS:  She complains of lower extremity edema (mild at days end and no worse than usual), dizziness (only if jumps up quickly)   She denies palpitations, orthopnea, exertional chest pressure/discomfort, claudication, dyspnea on exertion     Patient Active Problem List   Diagnosis Code    Hypertension, essential I10    Plantar fasciitis M72.2    Spinal stenosis M48.00    Cervical neck pain with evidence of disc disease M50.90    GERD (gastroesophageal reflux disease) K21.9    Rotator cuff rupture M75.100    Tubular adenoma of colon D12.6    Advance directive on file Z78.9    Osteopenia with high risk of fracture M85.80    Hypercholesterolemia E78.00     Past Medical History:   Diagnosis Date    GERD (gastroesophageal reflux disease)     Hypercholesterolemia     Hypertension      Past Surgical History:   Procedure Laterality Date    ABDOMEN SURGERY PROC UNLISTED      Laproscopic colon polyp excision Dr. Shayla Burciaga HX APPENDECTOMY      Age 25    HX HEENT Bilateral     cataracts    HX ORTHOPAEDIC      Lumbar laminectomy    [facility] [facility] FLX W/RMVL OF TUMOR POLYP LESION SNARE TQ  2014          Social History     Social History    Marital status:      Spouse name: N/A    Number of children: N/A    Years of education: N/A     Social History Main Topics    Smoking status: Never Smoker    Smokeless tobacco: Never Used    Alcohol use None    Drug use: None    Sexual activity: Not Asked     Other Topics Concern    None Social History Narrative     Family History   Problem Relation Age of Onset    Heart Disease Mother     Stroke Mother     Cancer Father     Cancer Brother     Heart Disease Brother     Cancer Brother      Allergies   Allergen Reactions    Contrast Agent [Iodine] Hives    Penicillins Hives    Bystolic [Nebivolol] Other (comments)     abd pain    Cardizem [Diltiazem Hcl] Rash    Cardura [Doxazosin] Unknown (comments)    Codeine Nausea Only    Cymbalta [Duloxetine] Other (comments)     Abdominal pain, anxiety    Gabapentin Other (comments)     Made her feel crazy    Prinivil [Lisinopril] Unknown (comments)    Tekturna [Aliskiren] Other (comments)     abd cramps    Tenormin [Atenolol] Unknown (comments)     Current Outpatient Prescriptions   Medication Sig Dispense Refill    amLODIPine (NORVASC) 5 mg tablet Take 1 Tab by mouth daily. 90 Tab 3    furosemide (LASIX) 40 mg tablet TAKE ONE TABLET BY MOUTH ONCE DAILY 90 Tab 1    atorvastatin (LIPITOR) 20 mg tablet TAKE ONE TABLET BY MOUTH ONCE DAILY 90 Tab 3    raNITIdine (ZANTAC) 150 mg tablet TAKE ONE TABLET BY MOUTH TWICE DAILY 180 Tab 3    losartan (COZAAR) 100 mg tablet TAKE ONE TABLET BY MOUTH ONCE DAILY 90 Tab 3    naproxen sodium (ALEVE) 220 mg cap Take  by mouth.  cholecalciferol (VITAMIN D3) 1,000 unit cap Take  by mouth daily.  magnesium oxide (MAG-OX) 400 mg tablet Take 1 Tab by mouth daily. 30 Tab 12    polyethylene glycol (MIRALAX) 17 gram/dose powder Take 17 g by mouth daily. Review of Systems   Constitutional: Positive for malaise/fatigue (only if does too much at once). Negative for weight loss. Gastrointestinal: Positive for constipation (Miralax). Negative for diarrhea and heartburn. Genitourinary:        Minimal incontinence   Musculoskeletal: Positive for back pain (low back), joint pain (hands) and neck pain ( Stenosis and arthritis, Massage helps-goes once a week. ).  Myalgias:     Neurological: Negative for dizziness, tingling and focal weakness. Visit Vitals    BP (!) 177/119 (BP 1 Location: Left arm, BP Patient Position: Sitting)    Pulse 92    Temp 98.3 °F (36.8 °C) (Oral)    Resp 20    Ht 5' 4\" (1.626 m)    Wt 166 lb (75.3 kg)    SpO2 95%    BMI 28.49 kg/m2     Physical Exam   Constitutional: She is oriented to person, place, and time. She appears well-developed and well-nourished. HENT:   Head: Normocephalic and atraumatic. Eyes: Conjunctivae are normal. Pupils are equal, round, and reactive to light. Neck: Neck supple. Carotid bruit is not present. No thyromegaly present. Cardiovascular: Normal rate, regular rhythm and normal heart sounds. PMI is not displaced. Exam reveals no gallop. No murmur heard. Pulses:       Dorsalis pedis pulses are 2+ on the right side, and 2+ on the left side. Posterior tibial pulses are 2+ on the right side, and 2+ on the left side. Pulmonary/Chest: Effort normal. She has no wheezes. She has no rhonchi. She has no rales. Abdominal: Soft. Normal appearance. She exhibits no abdominal bruit and no mass. There is no hepatosplenomegaly. There is no tenderness. Musculoskeletal: She exhibits no edema. Lymphadenopathy:     She has no cervical adenopathy. Right: No supraclavicular adenopathy present. Left: No supraclavicular adenopathy present. Neurological: She is alert and oriented to person, place, and time. No sensory deficit. Skin: Skin is warm, dry and intact. No rash noted. Psychiatric: She has a normal mood and affect. Her behavior is normal.   Nursing note and vitals reviewed.     Lab Results   Component Value Date/Time    Cholesterol, total 148 07/19/2017 12:00 AM    HDL Cholesterol 43 07/19/2017 12:00 AM    LDL, calculated 82 07/19/2017 12:00 AM    VLDL, calculated 23 07/19/2017 12:00 AM    Triglyceride 115 07/19/2017 12:00 AM     Lab Results   Component Value Date/Time    Sodium 143 01/31/2018 08:22 AM Potassium 4.3 01/31/2018 08:22 AM    Chloride 102 01/31/2018 08:22 AM    CO2 23 01/31/2018 08:22 AM    Glucose 99 01/31/2018 08:22 AM    BUN 15 01/31/2018 08:22 AM    Creatinine 0.81 01/31/2018 08:22 AM    BUN/Creatinine ratio 19 01/31/2018 08:22 AM    GFR est AA 78 01/31/2018 08:22 AM    GFR est non-AA 67 01/31/2018 08:22 AM    Calcium 9.9 01/31/2018 08:22 AM    Bilirubin, total 0.4 07/19/2017 12:00 AM    AST (SGOT) 28 07/19/2017 12:00 AM    Alk. phosphatase 107 07/19/2017 12:00 AM    Protein, total 7.1 07/19/2017 12:00 AM    Albumin 4.4 07/19/2017 12:00 AM    A-G Ratio 1.6 07/19/2017 12:00 AM    ALT (SGPT) 22 07/19/2017 12:00 AM       ASSESSMENT and PLAN    ICD-10-CM ICD-9-CM    1. Medicare annual wellness visit, subsequent Z00.00 V70.0    2. Advance directive discussed with patient Z71.89 V65.49    3. Hypertension, essential I10 401.9 amLODIPine (NORVASC) 5 mg tablet      cloNIDine HCl (CATAPRES) 0.1 mg tablet   4. Hypercholesterolemia E78.00 272.0    5. Screening for depression Z13.89 V79.0 CT DEPRESSION SCREEN ANNUAL     Diagnoses and all orders for this visit:    1. Medicare annual wellness visit, subsequent    2. Advance directive discussed with patient    3. Hypertension, essential  Hypertension is uncontrolled - medication changes/orders  -   Continue amLODIPine (NORVASC) 5 mg tablet; Take 1 Tab by mouth daily.  -   Add  cloNIDine HCl (CATAPRES) 0.1 mg tablet; Take one po at bedtime  Indications: hypertension    4. Hypercholesterolemia  Hyperlipidemia is controlled    5. Screening for depression  -     CT DEPRESSION SCREEN ANNUAL      Follow-up Disposition:  Return in about 6 weeks (around 5/28/2018) for HTN .  lab results and schedule of future lab studies reviewed with patient  reviewed diet, exercise and weight control  cardiovascular risk and specific lipid/LDL goals reviewed  I have discussed the diagnosis, evaluation and treatment options and the intended plan with the patient.  Patient is in agreement. The patient has received an after-visit summary and questions were answered concerning future plans. I have discussed side effects and warnings of any new medications with the patient as well.

## 2018-04-16 NOTE — PATIENT INSTRUCTIONS
Start clonidine 0.1 mg at bedtime in addition to current medication. Return in 6 weeks for blood pressure. The best way to stay healthy is to live a healthy lifestyle. A healthy lifestyle includes regular exercise, eating a well-balanced diet, keeping a healthy weight and not smoking. Regular physical exams and screening tests are another important way to take care of yourself. Preventive exams provided by health care providers can find health problems early when treatment works best and can keep you from getting certain diseases or illnesses. Preventive services include exams, lab tests, screenings, shots, monitoring and information to help you take care of your own health. All people over 65 should have a pneumonia shot. Pneumonia shots are usually only needed once in a lifetime unless your doctor decides differently. In addition to your physical exam, some screening tests are recommended:    All people over 65 should have a yearly flu shot. People over 65 are at medium to high risk for Hepatitis B. Three shots are needed for complete protection. Bone mass measurement (dexa scan) is recommended every two years. Diabetes Mellitus screening is recommended every year. Glaucoma is an eye disease caused by high pressure in the eye. An eye exam is recommended every year. Cardiovascular screening tests that check your cholesterol and other blood fat (lipid) levels are recommended every five years. Colorectal Cancer screening tests help to find pre-cancerous polyps (growths in the colon) so they can be removed before they turn into cancer. Tests ordered for screening depend on your personal and family history risk factors. Prostate Cancer Screening (annually up to age 76)    Screening for breast cancer is recommended yearly with a Mammogram.    Screening for cervical and vaginal cancer is recommended with a pelvic and Pap test every two years.  However if you have had an abnormal pap in the past  three years or at high risk for cervical or vaginal cancer Medicare will cover a pap test and a pelvic exam every year. Here is a list of your current Health Maintenance items with a due date:  Health Maintenance Due   Topic Date Due    Glaucoma Screening   03/01/2018    Annual Well Visit  03/14/2018       DASH Diet: Care Instructions  Your Care Instructions    The DASH diet is an eating plan that can help lower your blood pressure. DASH stands for Dietary Approaches to Stop Hypertension. Hypertension is high blood pressure. The DASH diet focuses on eating foods that are high in calcium, potassium, and magnesium. These nutrients can lower blood pressure. The foods that are highest in these nutrients are fruits, vegetables, low-fat dairy products, nuts, seeds, and legumes. But taking calcium, potassium, and magnesium supplements instead of eating foods that are high in those nutrients does not have the same effect. The DASH diet also includes whole grains, fish, and poultry. The DASH diet is one of several lifestyle changes your doctor may recommend to lower your high blood pressure. Your doctor may also want you to decrease the amount of sodium in your diet. Lowering sodium while following the DASH diet can lower blood pressure even further than just the DASH diet alone. Follow-up care is a key part of your treatment and safety. Be sure to make and go to all appointments, and call your doctor if you are having problems. It's also a good idea to know your test results and keep a list of the medicines you take. How can you care for yourself at home? Following the DASH diet  · Eat 4 to 5 servings of fruit each day. A serving is 1 medium-sized piece of fruit, ½ cup chopped or canned fruit, 1/4 cup dried fruit, or 4 ounces (½ cup) of fruit juice. Choose fruit more often than fruit juice. · Eat 4 to 5 servings of vegetables each day.  A serving is 1 cup of lettuce or raw leafy vegetables, ½ cup of chopped or cooked vegetables, or 4 ounces (½ cup) of vegetable juice. Choose vegetables more often than vegetable juice. · Get 2 to 3 servings of low-fat and fat-free dairy each day. A serving is 8 ounces of milk, 1 cup of yogurt, or 1 ½ ounces of cheese. · Eat 6 to 8 servings of grains each day. A serving is 1 slice of bread, 1 ounce of dry cereal, or ½ cup of cooked rice, pasta, or cooked cereal. Try to choose whole-grain products as much as possible. · Limit lean meat, poultry, and fish to 2 servings each day. A serving is 3 ounces, about the size of a deck of cards. · Eat 4 to 5 servings of nuts, seeds, and legumes (cooked dried beans, lentils, and split peas) each week. A serving is 1/3 cup of nuts, 2 tablespoons of seeds, or ½ cup of cooked beans or peas. · Limit fats and oils to 2 to 3 servings each day. A serving is 1 teaspoon of vegetable oil or 2 tablespoons of salad dressing. · Limit sweets and added sugars to 5 servings or less a week. A serving is 1 tablespoon jelly or jam, ½ cup sorbet, or 1 cup of lemonade. · Eat less than 2,300 milligrams (mg) of sodium a day. If you limit your sodium to 1,500 mg a day, you can lower your blood pressure even more. Tips for success  · Start small. Do not try to make dramatic changes to your diet all at once. You might feel that you are missing out on your favorite foods and then be more likely to not follow the plan. Make small changes, and stick with them. Once those changes become habit, add a few more changes. · Try some of the following:  ¨ Make it a goal to eat a fruit or vegetable at every meal and at snacks. This will make it easy to get the recommended amount of fruits and vegetables each day. ¨ Try yogurt topped with fruit and nuts for a snack or healthy dessert. ¨ Add lettuce, tomato, cucumber, and onion to sandwiches. ¨ Combine a ready-made pizza crust with low-fat mozzarella cheese and lots of vegetable toppings.  Try using tomatoes, squash, spinach, broccoli, carrots, cauliflower, and onions. ¨ Have a variety of cut-up vegetables with a low-fat dip as an appetizer instead of chips and dip. ¨ Sprinkle sunflower seeds or chopped almonds over salads. Or try adding chopped walnuts or almonds to cooked vegetables. ¨ Try some vegetarian meals using beans and peas. Add garbanzo or kidney beans to salads. Make burritos and tacos with mashed angela beans or black beans. Where can you learn more? Go to http://hua-skip.info/. Enter X393 in the search box to learn more about \"DASH Diet: Care Instructions. \"  Current as of: September 21, 2016  Content Version: 11.4  © 2302-3531 [employer]. Care instructions adapted under license by [employer] (which disclaims liability or warranty for this information). If you have questions about a medical condition or this instruction, always ask your healthcare professional. [address] any warranty or liability for your use of this information.

## 2018-04-26 ENCOUNTER — TELEPHONE (OUTPATIENT)
Dept: INTERNAL MEDICINE CLINIC | Facility: CLINIC | Age: 83
End: 2018-04-26

## 2018-04-30 NOTE — TELEPHONE ENCOUNTER
Verified two patient identifiers, name and . Advised of Dr Maddox Dear message, pt had no further questions at this time.

## 2018-06-04 ENCOUNTER — OFFICE VISIT (OUTPATIENT)
Dept: INTERNAL MEDICINE CLINIC | Facility: CLINIC | Age: 83
End: 2018-06-04

## 2018-06-04 VITALS
RESPIRATION RATE: 20 BRPM | SYSTOLIC BLOOD PRESSURE: 156 MMHG | HEART RATE: 80 BPM | WEIGHT: 162.5 LBS | BODY MASS INDEX: 27.74 KG/M2 | DIASTOLIC BLOOD PRESSURE: 80 MMHG | TEMPERATURE: 98.6 F | OXYGEN SATURATION: 97 % | HEIGHT: 64 IN

## 2018-06-04 DIAGNOSIS — I10 HYPERTENSION, ESSENTIAL: ICD-10-CM

## 2018-06-04 RX ORDER — DEXTROMETHORPHAN HYDROBROMIDE, GUAIFENESIN 5; 100 MG/5ML; MG/5ML
650 LIQUID ORAL AS NEEDED
COMMUNITY

## 2018-06-04 RX ORDER — CLONIDINE HYDROCHLORIDE 0.1 MG/1
0.1 TABLET ORAL 2 TIMES DAILY
Qty: 180 TAB | Refills: 3 | Status: SHIPPED | OUTPATIENT
Start: 2018-06-04 | End: 2019-08-12 | Stop reason: SDUPTHER

## 2018-06-04 NOTE — MR AVS SNAPSHOT
[address] [address] [phone] Patient: Carlyle Kussmaul MRN: [id number] COTTON:5[birth year]52/1125 Visit Information Date & Time Provider Department Dept. Phone Encounter #  
 6/4/2018 10:00 AM Lenin Damon MD [facility] Internal Medicine of [address] [place] [address] [id number] Follow-up Instructions Return in about 6 weeks (around 7/16/2018) for HTN . Upcoming Health Maintenance Date Due  
 GLAUCOMA SCREENING Q2Y 3/1/2018 Influenza Age 5 to Adult 8/1/2018 MEDICARE YEARLY EXAM 4/17/2019 DTaP/Tdap/Td series (2 - Td) 7/16/2023 Allergies as of 6/4/2018  Review Complete On: 6/4/2018 By: Lenin Damon MD  
  
 Severity Noted Reaction Type Reactions Contrast Agent [Iodine] High 03/01/2010    Hives Penicillins High 03/01/2010    Hives Bystolic [Nebivolol]  49/70/8175    Other (comments)  
 abd pain  
 Cardizem [Diltiazem Hcl]  03/01/2010    Rash Cardura [Doxazosin]  03/01/2010    Unknown (comments) Codeine  03/01/2010    Nausea Only Cymbalta [Duloxetine]  01/19/2017    Other (comments) Abdominal pain, anxiety Gabapentin  03/02/2017    Other (comments) Made her feel crazy Prinivil [Lisinopril]  03/01/2010    Unknown (comments) Tekturna [Aliskiren]  03/01/2010    Other (comments)  
 abd cramps Tenormin [Atenolol]  03/01/2010    Unknown (comments) Current Immunizations  Reviewed on 6/4/2018 Name Date Influenza High Dose Vaccine PF 12/12/2016, 10/26/2015, 10/3/2014 Influenza Vaccine 9/17/2013 Influenza Vaccine Parker Bitter) 11/24/2017 Pneumococcal Conjugate (PCV-13) 4/3/2015 TD Vaccine 6/1/2007 Tdap 7/16/2013 ZZZ-RETIRED (DO NOT USE) Pneumococcal Vaccine (Unspecified Type) 10/1/2002 Zoster 6/30/2009 Reviewed by Tanner Kahn LPN on 3/9/1099 at  1:76 AM  
You Were Diagnosed With   
  
 Codes Comments  Hypertension, essential     ICD-10-CM: I10 
 ICD-9-CM: 401.9 Vitals BP Pulse Temp Resp Height(growth percentile) Weight(growth percentile) 156/80 (BP 1 Location: Left arm, BP Patient Position: Sitting) 80 98.6 °F (37 °C) (Oral) 20 5' 4\" (1.626 m) 162 lb 8 oz (73.7 kg) SpO2 BMI OB Status Smoking Status (!) 7% 27.89 kg/m2 Postmenopausal Never Smoker Vitals History BMI and BSA Data Body Mass Index Body Surface Area  
 27.89 kg/m 2 1.82 m 2 Preferred Pharmacy [facility] Name Phone [address] [facility] [address] [facility] [phone] Your Updated Medication List  
  
   
This list is accurate as of 6/4/18 10:23 AM.  Always use your most recent med list. amLODIPine 5 mg tablet Commonly known as:  [place] Abbasi Take 1 Tab by mouth daily. atorvastatin 20 mg tablet Commonly known as:  LIPITOR  
TAKE ONE TABLET BY MOUTH ONCE DAILY  
  
 cholecalciferol 1,000 unit Cap Commonly known as:  VITAMIN D3 Take  by mouth daily. cloNIDine HCl 0.1 mg tablet Commonly known as:  CATAPRES Take 1 Tab by mouth two (2) times a day. Indications: hypertension  
  
 furosemide 40 mg tablet Commonly known as:  LASIX TAKE ONE TABLET BY MOUTH ONCE DAILY losartan 100 mg tablet Commonly known as:  COZAAR  
TAKE ONE TABLET BY MOUTH ONCE DAILY  
  
 magnesium oxide 400 mg tablet Commonly known as:  MAG-OX Take 1 Tab by mouth daily. MIRALAX 17 gram/dose powder Generic drug:  polyethylene glycol Take 17 g by mouth daily. TYLENOL ARTHRITIS PAIN 650 mg Inocente Chew Generic drug:  acetaminophen Take 650 mg by mouth every eight (8) hours. Prescriptions Sent to Pharmacy Refills  
 cloNIDine HCl (CATAPRES) 0.1 mg tablet 3 Sig: Take 1 Tab by mouth two (2) times a day. Indications: hypertension Class: Normal  
 Pharmacy: [address] [facility] Ph #: [phone] Route: Oral  
  
Follow-up Instructions Return in about 6 weeks (around 7/16/2018) for HTN . Patient Instructions Increase clonidine to 0.1 mg twice daily for blood pressure Return in 6 weeks. Bring some blood pressures with you next time. DASH Diet: Care Instructions Your Care Instructions The DASH diet is an eating plan that can help lower your blood pressure. DASH stands for Dietary Approaches to Stop Hypertension. Hypertension is high blood pressure. The DASH diet focuses on eating foods that are high in calcium, potassium, and magnesium. These nutrients can lower blood pressure. The foods that are highest in these nutrients are fruits, vegetables, low-fat dairy products, nuts, seeds, and legumes. But taking calcium, potassium, and magnesium supplements instead of eating foods that are high in those nutrients does not have the same effect. The DASH diet also includes whole grains, fish, and poultry. The DASH diet is one of several lifestyle changes your doctor may recommend to lower your high blood pressure. Your doctor may also want you to decrease the amount of sodium in your diet. Lowering sodium while following the DASH diet can lower blood pressure even further than just the DASH diet alone. Follow-up care is a key part of your treatment and safety. Be sure to make and go to all appointments, and call your doctor if you are having problems. It's also a good idea to know your test results and keep a list of the medicines you take. How can you care for yourself at home? Following the DASH diet · Eat 4 to 5 servings of fruit each day. A serving is 1 medium-sized piece of fruit, ½ cup chopped or canned fruit, 1/4 cup dried fruit, or 4 ounces (½ cup) of fruit juice. Choose fruit more often than fruit juice. · Eat 4 to 5 servings of vegetables each day. A serving is 1 cup of lettuce or raw leafy vegetables, ½ cup of chopped or cooked vegetables, or 4 ounces (½ cup) of vegetable juice.  Choose vegetables more often than vegetable juice. · Get 2 to 3 servings of low-fat and fat-free dairy each day. A serving is 8 ounces of milk, 1 cup of yogurt, or 1 ½ ounces of cheese. · Eat 6 to 8 servings of grains each day. A serving is 1 slice of bread, 1 ounce of dry cereal, or ½ cup of cooked rice, pasta, or cooked cereal. Try to choose whole-grain products as much as possible. · Limit lean meat, poultry, and fish to 2 servings each day. A serving is 3 ounces, about the size of a deck of cards. · Eat 4 to 5 servings of nuts, seeds, and legumes (cooked dried beans, lentils, and split peas) each week. A serving is 1/3 cup of nuts, 2 tablespoons of seeds, or ½ cup of cooked beans or peas. · Limit fats and oils to 2 to 3 servings each day. A serving is 1 teaspoon of vegetable oil or 2 tablespoons of salad dressing. · Limit sweets and added sugars to 5 servings or less a week. A serving is 1 tablespoon jelly or jam, ½ cup sorbet, or 1 cup of lemonade. · Eat less than 2,300 milligrams (mg) of sodium a day. If you limit your sodium to 1,500 mg a day, you can lower your blood pressure even more. Tips for success · Start small. Do not try to make dramatic changes to your diet all at once. You might feel that you are missing out on your favorite foods and then be more likely to not follow the plan. Make small changes, and stick with them. Once those changes become habit, add a few more changes. · Try some of the following: ¨ Make it a goal to eat a fruit or vegetable at every meal and at snacks. This will make it easy to get the recommended amount of fruits and vegetables each day. ¨ Try yogurt topped with fruit and nuts for a snack or healthy dessert. ¨ Add lettuce, tomato, cucumber, and onion to sandwiches. ¨ Combine a ready-made pizza crust with low-fat mozzarella cheese and lots of vegetable toppings. Try using tomatoes, squash, spinach, broccoli, carrots, cauliflower, and onions. ¨ Have a variety of cut-up vegetables with a low-fat dip as an appetizer instead of chips and dip. ¨ Sprinkle sunflower seeds or chopped almonds over salads. Or try adding chopped walnuts or almonds to cooked vegetables. ¨ Try some vegetarian meals using beans and peas. Add garbanzo or kidney beans to salads. Make burritos and tacos with mashed angela beans or black beans. Where can you learn more? Go to http://hua-skip.info/. Enter M839 in the search box to learn more about \"DASH Diet: Care Instructions. \" Current as of: September 21, 2016 Content Version: 11.4 © 4787-2659 [employer]. Care instructions adapted under license by [employer] (which disclaims liability or warranty for this information). If you have questions about a medical condition or this instruction, always ask your healthcare professional. [address] [address] any warranty or liability for your use of this information. Introducing [facility]! Lulú Mooney introduces [email or web address] patient portal. Now you can access parts of your medical record, email your doctor's office, and request medication refills online. 1. In your internet browser, go to [email or web address] 2. Click on the First Time User? Click Here link in the Sign In box. You will see the New Member Sign Up page. 3. Enter your [email or web address] Access Code exactly as it appears below. You will not need to use this code after youve completed the sign-up process. If you do not sign up before the expiration date, you must request a new code. · [email or web address] Access Code: [email or web address] Expires: 7/15/2018  4:24 PM 
 
4. Enter the last four digits of your Social Security Number (xxxx) and Date of Birth (mm/dd/yyyy) as indicated and click Submit. You will be taken to the next sign-up page. 5. Create a [email or web address] ID.  This will be your [email or web address] login ID and cannot be changed, so think of one that is secure and easy to remember. 6. Create a "[employer] password. You can change your password at any time. 7. Enter your Password Reset Question and Answer. This can be used at a later time if you forget your password. 8. Enter your e-mail address. You will receive e-mail notification when new information is available in [address] 9. Click Sign Up. You can now view and download portions of your medical record. 10. Click the Download Summary menu link to download a portable copy of your medical information. If you have questions, please visit the Frequently Asked Questions section of the "[employer] website. Remember, "[employer] is NOT to be used for urgent needs. For medical emergencies, dial 911. Now available from your iPhone and Android! Please provide this summary of care documentation to your next provider. Your primary care clinician is listed as Yasir Nobles. If you have any questions after today's visit, please call [phone].

## 2018-06-04 NOTE — PATIENT INSTRUCTIONS
Increase clonidine to 0.1 mg twice daily for blood pressure   Return in 6 weeks. Bring some blood pressures with you next time. DASH Diet: Care Instructions  Your Care Instructions    The DASH diet is an eating plan that can help lower your blood pressure. DASH stands for Dietary Approaches to Stop Hypertension. Hypertension is high blood pressure. The DASH diet focuses on eating foods that are high in calcium, potassium, and magnesium. These nutrients can lower blood pressure. The foods that are highest in these nutrients are fruits, vegetables, low-fat dairy products, nuts, seeds, and legumes. But taking calcium, potassium, and magnesium supplements instead of eating foods that are high in those nutrients does not have the same effect. The DASH diet also includes whole grains, fish, and poultry. The DASH diet is one of several lifestyle changes your doctor may recommend to lower your high blood pressure. Your doctor may also want you to decrease the amount of sodium in your diet. Lowering sodium while following the DASH diet can lower blood pressure even further than just the DASH diet alone. Follow-up care is a key part of your treatment and safety. Be sure to make and go to all appointments, and call your doctor if you are having problems. It's also a good idea to know your test results and keep a list of the medicines you take. How can you care for yourself at home? Following the DASH diet  · Eat 4 to 5 servings of fruit each day. A serving is 1 medium-sized piece of fruit, ½ cup chopped or canned fruit, 1/4 cup dried fruit, or 4 ounces (½ cup) of fruit juice. Choose fruit more often than fruit juice. · Eat 4 to 5 servings of vegetables each day. A serving is 1 cup of lettuce or raw leafy vegetables, ½ cup of chopped or cooked vegetables, or 4 ounces (½ cup) of vegetable juice. Choose vegetables more often than vegetable juice. · Get 2 to 3 servings of low-fat and fat-free dairy each day.  A serving is 8 ounces of milk, 1 cup of yogurt, or 1 ½ ounces of cheese. · Eat 6 to 8 servings of grains each day. A serving is 1 slice of bread, 1 ounce of dry cereal, or ½ cup of cooked rice, pasta, or cooked cereal. Try to choose whole-grain products as much as possible. · Limit lean meat, poultry, and fish to 2 servings each day. A serving is 3 ounces, about the size of a deck of cards. · Eat 4 to 5 servings of nuts, seeds, and legumes (cooked dried beans, lentils, and split peas) each week. A serving is 1/3 cup of nuts, 2 tablespoons of seeds, or ½ cup of cooked beans or peas. · Limit fats and oils to 2 to 3 servings each day. A serving is 1 teaspoon of vegetable oil or 2 tablespoons of salad dressing. · Limit sweets and added sugars to 5 servings or less a week. A serving is 1 tablespoon jelly or jam, ½ cup sorbet, or 1 cup of lemonade. · Eat less than 2,300 milligrams (mg) of sodium a day. If you limit your sodium to 1,500 mg a day, you can lower your blood pressure even more. Tips for success  · Start small. Do not try to make dramatic changes to your diet all at once. You might feel that you are missing out on your favorite foods and then be more likely to not follow the plan. Make small changes, and stick with them. Once those changes become habit, add a few more changes. · Try some of the following:  ¨ Make it a goal to eat a fruit or vegetable at every meal and at snacks. This will make it easy to get the recommended amount of fruits and vegetables each day. ¨ Try yogurt topped with fruit and nuts for a snack or healthy dessert. ¨ Add lettuce, tomato, cucumber, and onion to sandwiches. ¨ Combine a ready-made pizza crust with low-fat mozzarella cheese and lots of vegetable toppings. Try using tomatoes, squash, spinach, broccoli, carrots, cauliflower, and onions. ¨ Have a variety of cut-up vegetables with a low-fat dip as an appetizer instead of chips and dip.   ¨ Sprinkle sunflower seeds or chopped almonds over salads. Or try adding chopped walnuts or almonds to cooked vegetables. ¨ Try some vegetarian meals using beans and peas. Add garbanzo or kidney beans to salads. Make burritos and tacos with mashed angela beans or black beans. Where can you learn more? Go to http://hua-skip.info/. Enter G861 in the search box to learn more about \"DASH Diet: Care Instructions. \"  Current as of: September 21, 2016  Content Version: 11.4  © 1536-6734 [employer]. Care instructions adapted under license by [employer] (which disclaims liability or warranty for this information). If you have questions about a medical condition or this instruction, always ask your healthcare professional. [address] any warranty or liability for your use of this information.

## 2018-06-04 NOTE — PROGRESS NOTES
HISTORY OF PRESENT ILLNESS  Katelynn Frye is a 80 y.o. female. HPI  She presents for follow up of hypertension. She also has hyperlipidemia. On  clonidine 0.1 mg at bedtime was added for BP of 171/119  Diet and Lifestyle: generally follows a low sodium diet  Medication compliance: compliant all of the time  Medication side effects: none  Home BP Monitorin-140's/70-80's  Cardiovascular ROS:  She complains of lower extremity edema. But is less than it was.      She denies exertional chest pressure/discomfort, dyspnea on exertion     Patient Active Problem List   Diagnosis Code    Hypertension, essential I10    Plantar fasciitis M72.2    Spinal stenosis M48.00    Cervical neck pain with evidence of disc disease M50.90    GERD (gastroesophageal reflux disease) K21.9    Rotator cuff rupture M75.100    Tubular adenoma of colon D12.6    Advance directive on file Z78.9    Osteopenia with high risk of fracture M85.80    Hypercholesterolemia E78.00     Past Medical History:   Diagnosis Date    GERD (gastroesophageal reflux disease)     Hypercholesterolemia     Hypertension      Past Surgical History:   Procedure Laterality Date    ABDOMEN SURGERY PROC UNLISTED      Laproscopic colon polyp excision Dr. Vedia Apley HX APPENDECTOMY      Age 25    HX HEENT Bilateral     cataracts    HX ORTHOPAEDIC      Lumbar laminectomy    ND COLSC FLX W/RMVL OF TUMOR POLYP LESION SNARE TQ  2014          Social History     Social History    Marital status:      Spouse name: N/A    Number of children: N/A    Years of education: N/A     Social History Main Topics    Smoking status: Never Smoker    Smokeless tobacco: Never Used    Alcohol use None    Drug use: None    Sexual activity: Not Asked     Other Topics Concern    None     Social History Narrative     Family History   Problem Relation Age of Onset    Heart Disease Mother     Stroke Mother     Cancer Father     Cancer Brother  Heart Disease Brother     Cancer Brother      Allergies   Allergen Reactions    Contrast Agent [Iodine] Hives    Penicillins Hives    Bystolic [Nebivolol] Other (comments)     abd pain    Cardizem [Diltiazem Hcl] Rash    Cardura [Doxazosin] Unknown (comments)    Codeine Nausea Only    Cymbalta [Duloxetine] Other (comments)     Abdominal pain, anxiety    Gabapentin Other (comments)     Made her feel crazy    Prinivil [Lisinopril] Unknown (comments)    Tekturna [Aliskiren] Other (comments)     abd cramps    Tenormin [Atenolol] Unknown (comments)     Current Outpatient Prescriptions   Medication Sig Dispense Refill    acetaminophen (TYLENOL ARTHRITIS PAIN) 650 mg TbER Take 650 mg by mouth every eight (8) hours.  amLODIPine (NORVASC) 5 mg tablet Take 1 Tab by mouth daily. 90 Tab 3    cloNIDine HCl (CATAPRES) 0.1 mg tablet Take one po at bedtime  Indications: hypertension 30 Tab 5    furosemide (LASIX) 40 mg tablet TAKE ONE TABLET BY MOUTH ONCE DAILY 90 Tab 1    atorvastatin (LIPITOR) 20 mg tablet TAKE ONE TABLET BY MOUTH ONCE DAILY 90 Tab 3    losartan (COZAAR) 100 mg tablet TAKE ONE TABLET BY MOUTH ONCE DAILY 90 Tab 3    cholecalciferol (VITAMIN D3) 1,000 unit cap Take  by mouth daily.  magnesium oxide (MAG-OX) 400 mg tablet Take 1 Tab by mouth daily. 30 Tab 12    polyethylene glycol (MIRALAX) 17 gram/dose powder Take 17 g by mouth daily. ROS     Visit Vitals    /80 (BP 1 Location: Left arm, BP Patient Position: Sitting)    Pulse 80    Temp 98.6 °F (37 °C) (Oral)    Resp 20    Ht 5' 4\" (1.626 m)    Wt 162 lb 8 oz (73.7 kg)    SpO2 97%    BMI 27.89 kg/m2     Physical Exam   Constitutional: She is oriented to person, place, and time. She appears well-developed and well-nourished. HENT:   Head: Normocephalic and atraumatic. Eyes: Pupils are equal, round, and reactive to light. Neck: Neck supple.    Cardiovascular: Normal rate, regular rhythm and normal heart sounds. Exam reveals no gallop. No murmur heard. Pulmonary/Chest: Effort normal and breath sounds normal. She has no wheezes. She has no rales. Musculoskeletal: She exhibits no edema. Neurological: She is alert and oriented to person, place, and time. Skin: Skin is warm, dry and intact. No rash noted. Nursing note and vitals reviewed. ASSESSMENT and PLAN    ICD-10-CM ICD-9-CM    1. Hypertension, essential I10 401.9 cloNIDine HCl (CATAPRES) 0.1 mg tablet     Diagnoses and all orders for this visit:    1. Hypertension, essential  Hypertension is uncontrolled - medication changes/orders  -    Increase cloNIDine HCl (CATAPRES) 0.1 mg tablet; Take 1 Tab by mouth two (2) times a day. Indications: hypertension      Follow-up Disposition:  Return in about 6 weeks (around 7/16/2018) for HTN . reviewed diet, exercise and weight control  I have discussed the diagnosis, evaluation and treatment options and the intended plan with the patient. Patient is in agreement. The patient has received an after-visit summary and questions were answered concerning future plans. I have discussed side effects and warnings of any new medications with the patient as well.

## 2018-06-04 NOTE — PROGRESS NOTES
Susan Chen  Identified pt with two pt identifiers(name and ). Chief Complaint   Patient presents with    Blood Pressure Check       Reviewed record In preparation for visit and have obtained necessary documentation. Advanced directive / living will on file. 1. Have you been to the ER, urgent care clinic or hospitalized since your last visit? No     2. Have you seen or consulted any other health care providers outside of the [address] [place] [address] since your last visit? Include any pap smears or colon screening. Dr Dickens Postin reviewed with provider. Health Maintenance reviewed:     Health Maintenance Due   Topic    GLAUCOMA SCREENING Q2Y                           Is it safe for you to go home?  Everardo Reyes           Wt Readings from Last 3 Encounters:   18 162 lb 8 oz (73.7 kg)   18 166 lb (75.3 kg)   17 162 lb (73.5 kg)       BP Readings from Last 3 Encounters:   18 156/80   18 (!) 177/119   17 (!) 169/101                                        No

## 2018-06-12 ENCOUNTER — TELEPHONE (OUTPATIENT)
Dept: INTERNAL MEDICINE CLINIC | Facility: CLINIC | Age: 83
End: 2018-06-12

## 2018-06-12 ENCOUNTER — APPOINTMENT (OUTPATIENT)
Dept: GENERAL RADIOLOGY | Age: 83
End: 2018-06-12
Attending: NURSE PRACTITIONER
Payer: MEDICARE

## 2018-06-12 ENCOUNTER — HOSPITAL ENCOUNTER (EMERGENCY)
Age: 83
Discharge: HOME | End: 2018-06-12
Attending: EMERGENCY MEDICINE
Payer: MEDICARE

## 2018-06-12 VITALS
RESPIRATION RATE: 20 BRPM | BODY MASS INDEX: 26.66 KG/M2 | WEIGHT: 160 LBS | DIASTOLIC BLOOD PRESSURE: 98 MMHG | HEART RATE: 73 BPM | HEIGHT: 65 IN | SYSTOLIC BLOOD PRESSURE: 156 MMHG | TEMPERATURE: 98 F | OXYGEN SATURATION: 98 %

## 2018-06-12 DIAGNOSIS — R07.9 CHEST PAIN, UNSPECIFIED TYPE: Primary | ICD-10-CM

## 2018-06-12 DIAGNOSIS — R10.13 ABDOMINAL PAIN, EPIGASTRIC: ICD-10-CM

## 2018-06-12 DIAGNOSIS — I44.7 LEFT BUNDLE BRANCH BLOCK: ICD-10-CM

## 2018-06-12 LAB
ALBUMIN SERPL-MCNC: 4.1 G/DL (ref 3.4–5)
ALBUMIN/GLOB SERPL: 1.1 {RATIO} (ref 0.8–1.7)
ALP SERPL-CCNC: 100 U/L (ref 45–117)
ALT SERPL-CCNC: 30 U/L (ref 13–56)
ANION GAP SERPL CALC-SCNC: 9 MMOL/L (ref 3–18)
APPEARANCE UR: CLEAR
AST SERPL-CCNC: 28 U/L (ref 15–37)
ATRIAL RATE: 79 BPM
BASOPHILS # BLD: 0 K/UL (ref 0–0.06)
BASOPHILS NFR BLD: 1 % (ref 0–2)
BILIRUB SERPL-MCNC: 0.5 MG/DL (ref 0.2–1)
BILIRUB UR QL: NEGATIVE
BUN SERPL-MCNC: 20 MG/DL (ref 7–18)
BUN/CREAT SERPL: 20 (ref 12–20)
CALCIUM SERPL-MCNC: 9.4 MG/DL (ref 8.5–10.1)
CALCULATED P AXIS, ECG09: 43 DEGREES
CALCULATED R AXIS, ECG10: -15 DEGREES
CALCULATED T AXIS, ECG11: 131 DEGREES
CHLORIDE SERPL-SCNC: 101 MMOL/L (ref 100–108)
CK MB CFR SERPL CALC: NORMAL % (ref 0–4)
CK MB SERPL-MCNC: <1 NG/ML (ref 5–25)
CK SERPL-CCNC: 33 U/L (ref 26–192)
CO2 SERPL-SCNC: 28 MMOL/L (ref 21–32)
COLOR UR: YELLOW
CREAT SERPL-MCNC: 1.01 MG/DL (ref 0.6–1.3)
DIAGNOSIS, 93000: NORMAL
DIFFERENTIAL METHOD BLD: ABNORMAL
EOSINOPHIL # BLD: 0.2 K/UL (ref 0–0.4)
EOSINOPHIL NFR BLD: 6 % (ref 0–5)
ERYTHROCYTE [DISTWIDTH] IN BLOOD BY AUTOMATED COUNT: 13.2 % (ref 11.6–14.5)
GLOBULIN SER CALC-MCNC: 3.7 G/DL (ref 2–4)
GLUCOSE SERPL-MCNC: 118 MG/DL (ref 74–99)
GLUCOSE UR STRIP.AUTO-MCNC: NEGATIVE MG/DL
HCT VFR BLD AUTO: 40.9 % (ref 35–45)
HGB BLD-MCNC: 13.8 G/DL (ref 12–16)
HGB UR QL STRIP: NEGATIVE
KETONES UR QL STRIP.AUTO: NEGATIVE MG/DL
LEUKOCYTE ESTERASE UR QL STRIP.AUTO: NEGATIVE
LIPASE SERPL-CCNC: 339 U/L (ref 73–393)
LYMPHOCYTES # BLD: 1.4 K/UL (ref 0.9–3.6)
LYMPHOCYTES NFR BLD: 40 % (ref 21–52)
MCH RBC QN AUTO: 29.2 PG (ref 24–34)
MCHC RBC AUTO-ENTMCNC: 33.7 G/DL (ref 31–37)
MCV RBC AUTO: 86.7 FL (ref 74–97)
MONOCYTES # BLD: 0.5 K/UL (ref 0.05–1.2)
MONOCYTES NFR BLD: 13 % (ref 3–10)
NEUTS SEG # BLD: 1.4 K/UL (ref 1.8–8)
NEUTS SEG NFR BLD: 40 % (ref 40–73)
NITRITE UR QL STRIP.AUTO: NEGATIVE
P-R INTERVAL, ECG05: 216 MS
PH UR STRIP: 7.5 [PH] (ref 5–8)
PLATELET # BLD AUTO: 210 K/UL (ref 135–420)
PMV BLD AUTO: 10.3 FL (ref 9.2–11.8)
POTASSIUM SERPL-SCNC: 3.6 MMOL/L (ref 3.5–5.5)
PROT SERPL-MCNC: 7.8 G/DL (ref 6.4–8.2)
PROT UR STRIP-MCNC: NEGATIVE MG/DL
Q-T INTERVAL, ECG07: 448 MS
QRS DURATION, ECG06: 142 MS
QTC CALCULATION (BEZET), ECG08: 513 MS
RBC # BLD AUTO: 4.72 M/UL (ref 4.2–5.3)
SODIUM SERPL-SCNC: 138 MMOL/L (ref 136–145)
SP GR UR REFRACTOMETRY: 1 (ref 1–1.03)
TROPONIN I SERPL-MCNC: <0.02 NG/ML (ref 0–0.06)
UROBILINOGEN UR QL STRIP.AUTO: 0.2 EU/DL (ref 0.2–1)
VENTRICULAR RATE, ECG03: 79 BPM
WBC # BLD AUTO: 3.6 K/UL (ref 4.6–13.2)

## 2018-06-12 PROCEDURE — 85025 COMPLETE CBC W/AUTO DIFF WBC: CPT | Performed by: NURSE PRACTITIONER

## 2018-06-12 PROCEDURE — 71046 X-RAY EXAM CHEST 2 VIEWS: CPT

## 2018-06-12 PROCEDURE — 82550 ASSAY OF CK (CPK): CPT | Performed by: NURSE PRACTITIONER

## 2018-06-12 PROCEDURE — 80053 COMPREHEN METABOLIC PANEL: CPT | Performed by: NURSE PRACTITIONER

## 2018-06-12 PROCEDURE — 99284 EMERGENCY DEPT VISIT MOD MDM: CPT

## 2018-06-12 PROCEDURE — 81003 URINALYSIS AUTO W/O SCOPE: CPT | Performed by: NURSE PRACTITIONER

## 2018-06-12 PROCEDURE — 93005 ELECTROCARDIOGRAM TRACING: CPT

## 2018-06-12 PROCEDURE — 83690 ASSAY OF LIPASE: CPT | Performed by: NURSE PRACTITIONER

## 2018-06-12 NOTE — TELEPHONE ENCOUNTER
Quinn Tilley, daughter on [employer]IPPA called wanting to know if pt needs to be seen by DR Hood Sprague. Advised pt needs to see cardiology, given both [facility] cardiology numbers. Pt's daughter had no further questions at this time.

## 2018-06-12 NOTE — TELEPHONE ENCOUNTER
Seen at [facility] today for chest and epigastric pain that was ultimately relieved by Rolaids. Troponin normal, but LBBB new since 2012. She declined admission but agrees to referral to cardiologist. She said she would call us. Dano Randolph

## 2018-06-12 NOTE — DISCHARGE INSTRUCTIONS
Chest Pain: Care Instructions  Your Care Instructions    There are many things that can cause chest pain. Some are not serious and will get better on their own in a few days. But some kinds of chest pain need more testing and treatment. Your doctor may have recommended a follow-up visit in the next 8 to 12 hours. If you are not getting better, you may need more tests or treatment. Even though your doctor has released you, you still need to watch for any problems. The doctor carefully checked you, but sometimes problems can develop later. If you have new symptoms or if your symptoms do not get better, get medical care right away. If you have worse or different chest pain or pressure that lasts more than 5 minutes or you passed out (lost consciousness), call 911 or seek other emergency help right away. A medical visit is only one step in your treatment. Even if you feel better, you still need to do what your doctor recommends, such as going to all suggested follow-up appointments and taking medicines exactly as directed. This will help you recover and help prevent future problems. How can you care for yourself at home? · Rest until you feel better. · Take your medicine exactly as prescribed. Call your doctor if you think you are having a problem with your medicine. · Do not drive after taking a prescription pain medicine. When should you call for help? Call 911 if:  ? · You passed out (lost consciousness). ? · You have severe difficulty breathing. ? · You have symptoms of a heart attack. These may include:  ¨ Chest pain or pressure, or a strange feeling in your chest.  ¨ Sweating. ¨ Shortness of breath. ¨ Nausea or vomiting. ¨ Pain, pressure, or a strange feeling in your back, neck, jaw, or upper belly or in one or both shoulders or arms. ¨ Lightheadedness or sudden weakness. ¨ A fast or irregular heartbeat.   After you call 911, the  may tell you to chew 1 adult-strength or 2 to 4 low-dose aspirin. Wait for an ambulance. Do not try to drive yourself. ?Call your doctor today if:  ? · You have any trouble breathing. ? · Your chest pain gets worse. ? · You are dizzy or lightheaded, or you feel like you may faint. ? · You are not getting better as expected. ? · You are having new or different chest pain. Where can you learn more? Go to http://hua-skip.info/. Enter A120 in the search box to learn more about \"Chest Pain: Care Instructions. \"  Current as of: March 20, 2017  Content Version: 11.4  © 2453-7954 [employer]. Care instructions adapted under license by [employer] (which disclaims liability or warranty for this information). If you have questions about a medical condition or this instruction, always ask your healthcare professional. [address] any warranty or liability for your use of this information.

## 2018-06-12 NOTE — ED TRIAGE NOTES
Pt arrives alert and oriented via EMS c/o ingestion x \" a few days\" and reports she woke up and felt :\"off I just don't know how to explain it\". Pt drove to fire station and had a new onset L Bundle branch block and was brought here. Pt reports a hx of HTN, took her medication this am.

## 2018-06-12 NOTE — ED PROVIDER NOTES
EMERGENCY DEPARTMENT HISTORY AND PHYSICAL EXAM    Date: 6/12/2018  Patient Name: Bryanna Lopes    History of Presenting Illness     Chief Complaint   Patient presents with    Epigastric Pain         History Provided By: Patient    Chief Complaint: epigastric pain  Duration: 1 Days  Timing:  Waxing and Waning  Location: epgastic  Quality: indigestion  Modifying Factors: Rolaids with relief  Associated Symptoms: dizziness, \"feeling different\"    Additional History (Context):   11:56 AM   Bryanna Lopes is a 80 y.o. female with PMHX of HTN who presents to the emergency department via EMS C/O waxing and waning epigastric pain described as \"indigestion\" starting yesterday. Pt has taken Rolaids with relief. Associated sxs include \"feeling different\" and intermittent dizziness. Reports hx of stress test. Pt is not followed by cardiology. Reports she is planning to go to [facility], and would like to see her PCP for cardiology referral. Pt denies shortness of breath, chest pain, vomiting, diarrhea, numbness, weakness, and any other sxs or complaints. PCP: Yulia Monroe MD    Current Outpatient Prescriptions   Medication Sig Dispense Refill    acetaminophen (TYLENOL ARTHRITIS PAIN) 650 mg TbER Take 650 mg by mouth every eight (8) hours.  cloNIDine HCl (CATAPRES) 0.1 mg tablet Take 1 Tab by mouth two (2) times a day. Indications: hypertension 180 Tab 3    amLODIPine (NORVASC) 5 mg tablet Take 1 Tab by mouth daily. 90 Tab 3    furosemide (LASIX) 40 mg tablet TAKE ONE TABLET BY MOUTH ONCE DAILY 90 Tab 1    atorvastatin (LIPITOR) 20 mg tablet TAKE ONE TABLET BY MOUTH ONCE DAILY 90 Tab 3    losartan (COZAAR) 100 mg tablet TAKE ONE TABLET BY MOUTH ONCE DAILY 90 Tab 3    cholecalciferol (VITAMIN D3) 1,000 unit cap Take  by mouth daily.  magnesium oxide (MAG-OX) 400 mg tablet Take 1 Tab by mouth daily. 30 Tab 12    polyethylene glycol (MIRALAX) 17 gram/dose powder Take 17 g by mouth daily. Past History     Past Medical History:  Past Medical History:   Diagnosis Date    GERD (gastroesophageal reflux disease)     Hypercholesterolemia     Hypertension        Past Surgical History:  Past Surgical History:   Procedure Laterality Date    ABDOMEN SURGERY PROC UNLISTED  2005    Laproscopic colon polyp excision Dr. Fredi Wagner HX APPENDECTOMY      Age 25    HX HEENT Bilateral     cataracts    HX ORTHOPAEDIC  1996    Lumbar laminectomy    CA COLSC FLX W/RMVL OF TUMOR POLYP LESION SNARE TQ  7/18/2014            Family History:  Family History   Problem Relation Age of Onset    Heart Disease Mother     Stroke Mother     Cancer Father     Cancer Brother     Heart Disease Brother     Cancer Brother        Social History:  Social History   Substance Use Topics    Smoking status: Never Smoker    Smokeless tobacco: Never Used    Alcohol use Not on file       Allergies: Allergies   Allergen Reactions    Contrast Agent [Iodine] Hives    Penicillins Hives    Bystolic [Nebivolol] Other (comments)     abd pain    Cardizem [Diltiazem Hcl] Rash    Cardura [Doxazosin] Unknown (comments)    Codeine Nausea Only    Cymbalta [Duloxetine] Other (comments)     Abdominal pain, anxiety    Gabapentin Other (comments)     Made her feel crazy    Prinivil [Lisinopril] Unknown (comments)    Tekturna [Aliskiren] Other (comments)     abd cramps    Tenormin [Atenolol] Unknown (comments)         Review of Systems   Review of Systems   Respiratory: Negative for cough and shortness of breath. Cardiovascular: Negative for chest pain. Gastrointestinal: Positive for abdominal pain (epigastric). Negative for diarrhea and vomiting. Neurological: Positive for dizziness. Negative for weakness and numbness. All other systems reviewed and are negative.       Physical Exam     Vitals:    06/12/18 1200 06/12/18 1200 06/12/18 1205 06/12/18 1252   BP: (!) 156/98 (!) 156/98     Pulse:   84 73   Resp: 22  19 20 Temp:       SpO2: 93%   98%   Weight:       Height:         Physical Exam   Constitutional: She is oriented to person, place, and time. She appears well-nourished.   elderly in appearance, NAD   HENT:   Head: Normocephalic and atraumatic. Eyes: Conjunctivae and EOM are normal. Pupils are equal, round, and reactive to light. Neck: Normal range of motion. Cardiovascular: Normal rate and regular rhythm. Pulmonary/Chest: Effort normal.   Decreased b/l bases   Abdominal: Soft. Bowel sounds are normal. There is no tenderness. Musculoskeletal: Normal range of motion. Strong equal strength all extremities   Negative upper and lower extremity drift, denies paraesthesia  No facial droop noted    Neurological: She is alert and oriented to person, place, and time. No cranial nerve deficit. Coordination normal.   Skin: Skin is warm and dry. Nursing note and vitals reviewed. Diagnostic Study Results     Labs -     Recent Results (from the past 12 hour(s))   CBC WITH AUTOMATED DIFF    Collection Time: 06/12/18 11:50 AM   Result Value Ref Range    WBC 3.6 (L) 4.6 - 13.2 K/uL    RBC 4.72 4.20 - 5.30 M/uL    HGB 13.8 12.0 - 16.0 g/dL    HCT 40.9 35.0 - 45.0 %    MCV 86.7 74.0 - 97.0 FL    MCH 29.2 24.0 - 34.0 PG    MCHC 33.7 31.0 - 37.0 g/dL    RDW 13.2 11.6 - 14.5 %    PLATELET 496 016 - 167 K/uL    MPV 10.3 9.2 - 11.8 FL    NEUTROPHILS 40 40 - 73 %    LYMPHOCYTES 40 21 - 52 %    MONOCYTES 13 (H) 3 - 10 %    EOSINOPHILS 6 (H) 0 - 5 %    BASOPHILS 1 0 - 2 %    ABS. NEUTROPHILS 1.4 (L) 1.8 - 8.0 K/UL    ABS. LYMPHOCYTES 1.4 0.9 - 3.6 K/UL    ABS. MONOCYTES 0.5 0.05 - 1.2 K/UL    ABS. EOSINOPHILS 0.2 0.0 - 0.4 K/UL    ABS.  BASOPHILS 0.0 0.0 - 0.06 K/UL    DF AUTOMATED     METABOLIC PANEL, COMPREHENSIVE    Collection Time: 06/12/18 11:50 AM   Result Value Ref Range    Sodium 138 136 - 145 mmol/L    Potassium 3.6 3.5 - 5.5 mmol/L    Chloride 101 100 - 108 mmol/L    CO2 28 21 - 32 mmol/L    Anion gap 9 3.0 - 18 mmol/L    Glucose 118 (H) 74 - 99 mg/dL    BUN 20 (H) 7.0 - 18 MG/DL    Creatinine 1.01 0.6 - 1.3 MG/DL    BUN/Creatinine ratio 20 12 - 20      GFR est AA >60 >60 ml/min/1.73m2    GFR est non-AA 52 (L) >60 ml/min/1.73m2    Calcium 9.4 8.5 - 10.1 MG/DL    Bilirubin, total 0.5 0.2 - 1.0 MG/DL    ALT (SGPT) 30 13 - 56 U/L    AST (SGOT) 28 15 - 37 U/L    Alk.  phosphatase 100 45 - 117 U/L    Protein, total 7.8 6.4 - 8.2 g/dL    Albumin 4.1 3.4 - 5.0 g/dL    Globulin 3.7 2.0 - 4.0 g/dL    A-G Ratio 1.1 0.8 - 1.7     LIPASE    Collection Time: 06/12/18 11:50 AM   Result Value Ref Range    Lipase 339 73 - 393 U/L   CARDIAC PANEL,(CK, CKMB & TROPONIN)    Collection Time: 06/12/18 11:50 AM   Result Value Ref Range    CK 33 26 - 192 U/L    CK - MB <1.0 <3.6 ng/ml    CK-MB Index  0.0 - 4.0 %     CALCULATION NOT PERFORMED WHEN RESULT IS BELOW LINEAR LIMIT    Troponin-I, Qt. <0.02 0.00 - 0.06 NG/ML   EKG, 12 LEAD, INITIAL    Collection Time: 06/12/18 11:51 AM   Result Value Ref Range    Ventricular Rate 79 BPM    Atrial Rate 79 BPM    P-R Interval 216 ms    QRS Duration 142 ms    Q-T Interval 448 ms    QTC Calculation (Bezet) 513 ms    Calculated P Axis 43 degrees    Calculated R Axis -15 degrees    Calculated T Axis 131 degrees    Diagnosis       Sinus rhythm with 1st degree AV block  Left bundle branch block  Abnormal ECG  No previous ECGs available     URINALYSIS W/ RFLX MICROSCOPIC    Collection Time: 06/12/18 12:02 PM   Result Value Ref Range    Color YELLOW      Appearance CLEAR      Specific gravity 1.005 1.005 - 1.030      pH (UA) 7.5 5.0 - 8.0      Protein NEGATIVE  NEG mg/dL    Glucose NEGATIVE  NEG mg/dL    Ketone NEGATIVE  NEG mg/dL    Bilirubin NEGATIVE  NEG      Blood NEGATIVE  NEG      Urobilinogen 0.2 0.2 - 1.0 EU/dL    Nitrites NEGATIVE  NEG      Leukocyte Esterase NEGATIVE  NEG         Radiologic Studies -   1:13 PM  RADIOLOGY FINDINGS  Chest X-ray shows no acute process  Pending review by Radiologist  Recorded by Aida Lott ED Scribe, as dictated by Seferino Hidalgo NP     XR CHEST PA LAT   Final Result        CT Results  (Last 48 hours)    None        CXR Results  (Last 48 hours)               06/12/18 1245  XR CHEST PA LAT Final result    Impression:  IMPRESSION:       1. No acute pulmonary process. Tortuous thoracic aorta. Narrative:  EXAM: Two-view chest       CLINICAL HISTORY: chest pain       COMPARISON: None       FINDINGS:       Frontal and lateral views of the chest demonstrate clear lungs. Tortuous   thoracic aorta. Cardiac, mediastinal and hilar contours are otherwise normal. No   acute bony or soft tissue abnormality. Medications given in the ED-  Medications - No data to display      Medical Decision Making   I am the first provider for this patient. I reviewed the vital signs, available nursing notes, past medical history, past surgical history, family history and social history. Vital Signs-Reviewed the patient's vital signs. Pulse Oximetry Analysis - 95% on room air     Cardiac Monitor:  Rate: 96 bpm  Rhythm: NSR    EKG interpretation: (Preliminary)  11:51 AM   NSR. Rate 79 bpm. With 1st degree AV block, new from previous 6 years ago. LBBB. No STEMI. EKG read by Seferino Hidalgo NP     Records Reviewed: Nursing Notes, Old Medical Records and Previous electrocardiograms    Provider Notes (Medical Decision Making): Patient with indigestion over the past 24 hours. Has been pain free since prior to coming to the ED after taking indigestion  Medications. Patient with new 1 st degree and LBBB on EKG cardiac enzyme negative. Would have like patient to stay for cardiac consult and further evaluation but patient is requesting discharge. I have spoken to the patients PCP and she will see her tomorrow and set her up with cardiology. Patient will be returning to [facility] and states she will go back to the nearest ER if the pain returns.      Procedures:  Procedures    ED Course: 11:56 AM Initial assessment performed. The patients presenting problems have been discussed, and they are in agreement with the care plan formulated and outlined with them. I have encouraged them to ask questions as they arise throughout their visit. 12:55 PM Discussed patient's history, exam, and EKG, new left BBB with Sofie Deras MD , ED Attending. Pt does not wish to stay for cardiac evaluation. Will touch base with pt's PCP. Sofie Deras MD is in agreement with plan. 1:07 PM Updated Clifford Muro MD, patients PCP, on patients LBBB seen on EKG. Their most recent EKG was from 2012 which was normal. She understands that the patient does not want to stay and will set the patient up with cardiology follow up. Diagnosis and Disposition     DISCHARGE NOTE:  1:13 PM   This patient is choosing to leave against medical advice. I have personally explained to her that choosing to do so may result in permanent bodily harm or death. I discussed at great length that without further evaluation and monitoring there may be unforeseen circumstances and/or deterioration causing permanent bodily harm or death as a result of her choice. She verbalized these risks back to me. She is alert, oriented, and shows the mental capacity to make clear decisions regarding her health care at this time. She continues to wish to leave against medical advice. In light of her decision to leave AMA, follow-up has been arranged and she is aware of the importance of following up as instructed. She has been advised that she should return to the ED immediately if she changes her mind at any time, or if her condition begins to change or worsen in any way. CLINICAL IMPRESSION:    1. Chest pain, unspecified type    2. Abdominal pain, epigastric    3. Left bundle branch block        PLAN:  1. D/C Home  2. Discharge Medication List as of 6/12/2018  1:14 PM        3.    Follow-up Information     Follow up With Details Comments Lawanda Caraballo MD Schedule an appointment as soon as possible for a visit in 2 days For primary care follow up and cardiology [address] [place]  [id number] [phone]      [facility] EMERGENCY DEPT  As needed, If symptoms worsen [address] [place] [address]  [phone]        _______________________________    Attestations: This note is prepared by Yvette Aguilar, acting as Scribe for Luis Thomas [employer]    Luis Thomas [employer]:  The scribe's documentation has been prepared under my direction and personally reviewed by me in its entirety.   I confirm that the note above accurately reflects all work, treatment, procedures, and medical decision making performed by me.  _______________________________

## 2018-06-14 ENCOUNTER — APPOINTMENT (OUTPATIENT)
Dept: GENERAL RADIOLOGY | Age: 83
End: 2018-06-14
Attending: PHYSICIAN ASSISTANT
Payer: MEDICARE

## 2018-06-14 ENCOUNTER — HOSPITAL ENCOUNTER (EMERGENCY)
Age: 83
Discharge: HOME | End: 2018-06-14
Attending: EMERGENCY MEDICINE
Payer: MEDICARE

## 2018-06-14 VITALS
TEMPERATURE: 97.3 F | HEART RATE: 72 BPM | OXYGEN SATURATION: 97 % | SYSTOLIC BLOOD PRESSURE: 173 MMHG | DIASTOLIC BLOOD PRESSURE: 86 MMHG | HEIGHT: 64 IN | WEIGHT: 160.27 LBS | BODY MASS INDEX: 27.36 KG/M2 | RESPIRATION RATE: 11 BRPM

## 2018-06-14 DIAGNOSIS — R07.89 CHEST TIGHTNESS: ICD-10-CM

## 2018-06-14 DIAGNOSIS — R06.02 SHORTNESS OF BREATH: Primary | ICD-10-CM

## 2018-06-14 LAB
ANION GAP SERPL CALC-SCNC: 7 MMOL/L (ref 5–15)
APPEARANCE UR: CLEAR
ATRIAL RATE: 81 BPM
BASOPHILS # BLD: 0 K/UL (ref 0–0.1)
BASOPHILS NFR BLD: 1 % (ref 0–1)
BILIRUB UR QL: NEGATIVE
BNP SERPL-MCNC: 74 PG/ML (ref 0–450)
BUN SERPL-MCNC: 17 MG/DL (ref 6–20)
BUN/CREAT SERPL: 16 (ref 12–20)
CALCIUM SERPL-MCNC: 9.9 MG/DL (ref 8.5–10.1)
CALCULATED P AXIS, ECG09: 21 DEGREES
CALCULATED R AXIS, ECG10: -30 DEGREES
CALCULATED T AXIS, ECG11: 129 DEGREES
CHLORIDE SERPL-SCNC: 104 MMOL/L (ref 97–108)
CK SERPL-CCNC: 45 U/L (ref 26–192)
CO2 SERPL-SCNC: 28 MMOL/L (ref 21–32)
COLOR UR: NORMAL
CREAT SERPL-MCNC: 1.07 MG/DL (ref 0.55–1.02)
D DIMER PPP FEU-MCNC: 0.45 MG/L FEU (ref 0–0.65)
DIAGNOSIS, 93000: NORMAL
DIFFERENTIAL METHOD BLD: ABNORMAL
EOSINOPHIL # BLD: 0.1 K/UL (ref 0–0.4)
EOSINOPHIL NFR BLD: 2 % (ref 0–7)
ERYTHROCYTE [DISTWIDTH] IN BLOOD BY AUTOMATED COUNT: 12.8 % (ref 11.5–14.5)
GLUCOSE SERPL-MCNC: 108 MG/DL (ref 65–100)
GLUCOSE UR STRIP.AUTO-MCNC: NEGATIVE MG/DL
HCT VFR BLD AUTO: 37.8 % (ref 35–47)
HGB BLD-MCNC: 13 G/DL (ref 11.5–16)
HGB UR QL STRIP: NEGATIVE
IMM GRANULOCYTES # BLD: 0 K/UL (ref 0–0.04)
IMM GRANULOCYTES NFR BLD AUTO: 0 % (ref 0–0.5)
INR PPP: 1 (ref 0.9–1.1)
KETONES UR QL STRIP.AUTO: NEGATIVE MG/DL
LEUKOCYTE ESTERASE UR QL STRIP.AUTO: NEGATIVE
LYMPHOCYTES # BLD: 0.9 K/UL (ref 0.8–3.5)
LYMPHOCYTES NFR BLD: 25 % (ref 12–49)
MCH RBC QN AUTO: 29.7 PG (ref 26–34)
MCHC RBC AUTO-ENTMCNC: 34.4 G/DL (ref 30–36.5)
MCV RBC AUTO: 86.5 FL (ref 80–99)
MONOCYTES # BLD: 0.5 K/UL (ref 0–1)
MONOCYTES NFR BLD: 13 % (ref 5–13)
NEUTS SEG # BLD: 2 K/UL (ref 1.8–8)
NEUTS SEG NFR BLD: 59 % (ref 32–75)
NITRITE UR QL STRIP.AUTO: NEGATIVE
NRBC # BLD: 0 K/UL (ref 0–0.01)
NRBC BLD-RTO: 0 PER 100 WBC
P-R INTERVAL, ECG05: 204 MS
PH UR STRIP: 7 [PH] (ref 5–8)
PLATELET # BLD AUTO: 203 K/UL (ref 150–400)
PMV BLD AUTO: 10.6 FL (ref 8.9–12.9)
POTASSIUM SERPL-SCNC: 3.4 MMOL/L (ref 3.5–5.1)
PROT UR STRIP-MCNC: NEGATIVE MG/DL
PROTHROMBIN TIME: 10.3 SEC (ref 9–11.1)
Q-T INTERVAL, ECG07: 418 MS
QRS DURATION, ECG06: 138 MS
QTC CALCULATION (BEZET), ECG08: 485 MS
RBC # BLD AUTO: 4.37 M/UL (ref 3.8–5.2)
SODIUM SERPL-SCNC: 139 MMOL/L (ref 136–145)
SP GR UR REFRACTOMETRY: 1.01 (ref 1–1.03)
TROPONIN I SERPL-MCNC: <0.05 NG/ML
TROPONIN I SERPL-MCNC: <0.05 NG/ML
UROBILINOGEN UR QL STRIP.AUTO: 0.2 EU/DL (ref 0.2–1)
VENTRICULAR RATE, ECG03: 81 BPM
WBC # BLD AUTO: 3.5 K/UL (ref 3.6–11)

## 2018-06-14 PROCEDURE — 36415 COLL VENOUS BLD VENIPUNCTURE: CPT | Performed by: PHYSICIAN ASSISTANT

## 2018-06-14 PROCEDURE — 85025 COMPLETE CBC W/AUTO DIFF WBC: CPT | Performed by: EMERGENCY MEDICINE

## 2018-06-14 PROCEDURE — 83880 ASSAY OF NATRIURETIC PEPTIDE: CPT | Performed by: PHYSICIAN ASSISTANT

## 2018-06-14 PROCEDURE — 85379 FIBRIN DEGRADATION QUANT: CPT | Performed by: EMERGENCY MEDICINE

## 2018-06-14 PROCEDURE — 71046 X-RAY EXAM CHEST 2 VIEWS: CPT

## 2018-06-14 PROCEDURE — 99285 EMERGENCY DEPT VISIT HI MDM: CPT

## 2018-06-14 PROCEDURE — 84484 ASSAY OF TROPONIN QUANT: CPT | Performed by: EMERGENCY MEDICINE

## 2018-06-14 PROCEDURE — 85610 PROTHROMBIN TIME: CPT | Performed by: PHYSICIAN ASSISTANT

## 2018-06-14 PROCEDURE — 93005 ELECTROCARDIOGRAM TRACING: CPT

## 2018-06-14 PROCEDURE — 81003 URINALYSIS AUTO W/O SCOPE: CPT | Performed by: PHYSICIAN ASSISTANT

## 2018-06-14 PROCEDURE — 93306 TTE W/DOPPLER COMPLETE: CPT

## 2018-06-14 PROCEDURE — 80048 BASIC METABOLIC PNL TOTAL CA: CPT | Performed by: PHYSICIAN ASSISTANT

## 2018-06-14 PROCEDURE — 82550 ASSAY OF CK (CPK): CPT | Performed by: EMERGENCY MEDICINE

## 2018-06-14 NOTE — ED NOTES
Patient returned from [facility] and patient discharge instructions reviewed with patient and patient daughter by MD Arturo Quinn. Patient transported off unit via wheelchair.

## 2018-06-14 NOTE — ED NOTES
Verbal bedside report received from Addison CASTELLANOS (off-going nurse). Patient resting comfortably in bed, daughter at bedside. Will continue to monitor and assess patient needs.

## 2018-06-14 NOTE — DISCHARGE INSTRUCTIONS
Chest Pain: Care Instructions  Your Care Instructions    There are many things that can cause chest pain. Some are not serious and will get better on their own in a few days. But some kinds of chest pain need more testing and treatment. Your doctor may have recommended a follow-up visit in the next 8 to 12 hours. If you are not getting better, you may need more tests or treatment. Even though your doctor has released you, you still need to watch for any problems. The doctor carefully checked you, but sometimes problems can develop later. If you have new symptoms or if your symptoms do not get better, get medical care right away. If you have worse or different chest pain or pressure that lasts more than 5 minutes or you passed out (lost consciousness), call 911 or seek other emergency help right away. A medical visit is only one step in your treatment. Even if you feel better, you still need to do what your doctor recommends, such as going to all suggested follow-up appointments and taking medicines exactly as directed. This will help you recover and help prevent future problems. How can you care for yourself at home? · Rest until you feel better. · Take your medicine exactly as prescribed. Call your doctor if you think you are having a problem with your medicine. · Do not drive after taking a prescription pain medicine. When should you call for help? Call 911 if:  ? · You passed out (lost consciousness). ? · You have severe difficulty breathing. ? · You have symptoms of a heart attack. These may include:  ¨ Chest pain or pressure, or a strange feeling in your chest.  ¨ Sweating. ¨ Shortness of breath. ¨ Nausea or vomiting. ¨ Pain, pressure, or a strange feeling in your back, neck, jaw, or upper belly or in one or both shoulders or arms. ¨ Lightheadedness or sudden weakness. ¨ A fast or irregular heartbeat.   After you call 911, the  may tell you to chew 1 adult-strength or 2 to 4 low-dose aspirin. Wait for an ambulance. Do not try to drive yourself. ?Call your doctor today if:  ? · You have any trouble breathing. ? · Your chest pain gets worse. ? · You are dizzy or lightheaded, or you feel like you may faint. ? · You are not getting better as expected. ? · You are having new or different chest pain. Where can you learn more? Go to http://hua-skip.info/. Enter A120 in the search box to learn more about \"Chest Pain: Care Instructions. \"  Current as of: March 20, 2017  Content Version: 11.4  © 2325-2717 [employer]. Care instructions adapted under license by [employer] (which disclaims liability or warranty for this information). If you have questions about a medical condition or this instruction, always ask your healthcare professional. [place] [address] any warranty or liability for your use of this information. Shortness of Breath: Care Instructions  Your Care Instructions  Shortness of breath has many causes. Sometimes conditions such as anxiety can lead to shortness of breath. Some people get mild shortness of breath when they exercise. Trouble breathing also can be a symptom of a serious problem, such as asthma, lung disease, emphysema, heart problems, and pneumonia. If your shortness of breath continues, you may need tests and treatment. Watch for any changes in your breathing and other symptoms. Follow-up care is a key part of your treatment and safety. Be sure to make and go to all appointments, and call your doctor if you are having problems. It's also a good idea to know your test results and keep a list of the medicines you take. How can you care for yourself at home? · Do not smoke or allow others to smoke around you. If you need help quitting, talk to your doctor about stop-smoking programs and medicines. These can increase your chances of quitting for good.   · Get plenty of rest and sleep.  · Take your medicines exactly as prescribed. Call your doctor if you think you are having a problem with your medicine. · Find healthy ways to deal with stress. ¨ Exercise daily. ¨ Get plenty of sleep. ¨ Eat regularly and well. When should you call for help? Call 911 anytime you think you may need emergency care. For example, call if:  ? · You have severe shortness of breath. ? · You have symptoms of a heart attack. These may include:  ¨ Chest pain or pressure, or a strange feeling in the chest.  ¨ Sweating. ¨ Shortness of breath. ¨ Nausea or vomiting. ¨ Pain, pressure, or a strange feeling in the back, neck, jaw, or upper belly or in one or both shoulders or arms. ¨ Lightheadedness or sudden weakness. ¨ A fast or irregular heartbeat. After you call 911, the  may tell you to chew 1 adult-strength or 2 to 4 low-dose aspirin. Wait for an ambulance. Do not try to drive yourself. ?Call your doctor now or seek immediate medical care if:  ? · Your shortness of breath gets worse or you start to wheeze. Wheezing is a high-pitched sound when you breathe. ? · You wake up at night out of breath or have to prop your head up on several pillows to breathe. ? · You are short of breath after only light activity or while at rest.   ? Watch closely for changes in your health, and be sure to contact your doctor if:  ? · You do not get better over the next 1 to 2 days. Where can you learn more? Go to http://hua-skip.info/. Enter S780 in the search box to learn more about \"Shortness of Breath: Care Instructions. \"  Current as of: May 12, 2017  Content Version: 11.4  © 7913-1989 [employer]. Care instructions adapted under license by [employer] (which disclaims liability or warranty for this information).  If you have questions about a medical condition or this instruction, always ask your healthcare professional. René [address]bert any warranty or liability for your use of this information.

## 2018-06-14 NOTE — CONSULTS
[address] [facility], [address] [facility]     Date of  Admission: 6/14/2018  9:54 AM     Admission type:Emergency    Consult for: SOB, CP  Consult by: ED MD      Subjective: Marycarmen Mayer is a 80 y.o. female admitted for SOB, dizziness, fatigue, indigestion. No previous cardiac hx, +HTN on multiple medications. New LBBB (last ECG 2012) noted last week at THE [facility] ED visit for similar complaints. BP elevated on admission to ED, now controlled. Patient denies CP, palpitations. ECG LBBB with no other acute changes, troponin neg x 2, ntproBNP 74, ddimer neg, CXray clear. Echo prelim with EF 40-45%, no WMA. Patient states she cervical spondylosis which causes pain and increased anxiety. Previous treatment/evaluation includes none .  Cardiac risk factors: dyslipidemia, obesity, hypertension, stress, post-menopausal.      Patient Active Problem List    Diagnosis Date Noted    SOB (shortness of breath) 06/14/2018    Hypercholesterolemia 01/19/2017    Osteopenia with high risk of fracture 03/09/2016    Advance directive on file 11/28/2015    Tubular adenoma of colon 08/04/2014    Rotator cuff rupture 03/12/2013    GERD (gastroesophageal reflux disease) 03/14/2011    Hypertension, essential 03/01/2010    Plantar fasciitis 03/01/2010    Spinal stenosis 03/01/2010    Cervical neck pain with evidence of disc disease 03/01/2010      Denisse Sandoval MD  Past Medical History:   Diagnosis Date    GERD (gastroesophageal reflux disease)     Hypercholesterolemia     Hypertension     SOB (shortness of breath) 6/14/2018      Social History     Social History    Marital status:      Spouse name: N/A    Number of children: N/A    Years of education: N/A     Social History Main Topics    Smoking status: Never Smoker    Smokeless tobacco: Never Used    Alcohol use No    Drug use: None    Sexual activity: Not Asked     Other Topics Concern    None     Social History Narrative     Allergies   Allergen Reactions    Contrast Agent [Iodine] Hives    Penicillins Hives    Bystolic [Nebivolol] Other (comments)     abd pain    Cardizem [Diltiazem Hcl] Rash    Cardura [Doxazosin] Unknown (comments)    Codeine Nausea Only    Cymbalta [Duloxetine] Other (comments)     Abdominal pain, anxiety    Gabapentin Other (comments)     Made her feel crazy    Prinivil [Lisinopril] Unknown (comments)    Tekturna [Aliskiren] Other (comments)     abd cramps    Tenormin [Atenolol] Unknown (comments)      Family History   Problem Relation Age of Onset    Heart Disease Mother     Stroke Mother     Cancer Father     Cancer Brother     Heart Disease Brother     Cancer Brother       No current facility-administered medications for this encounter. Current Outpatient Prescriptions   Medication Sig    cloNIDine HCl (CATAPRES) 0.1 mg tablet Take 1 Tab by mouth two (2) times a day. Indications: hypertension    amLODIPine (NORVASC) 5 mg tablet Take 1 Tab by mouth daily.  furosemide (LASIX) 40 mg tablet TAKE ONE TABLET BY MOUTH ONCE DAILY    atorvastatin (LIPITOR) 20 mg tablet TAKE ONE TABLET BY MOUTH ONCE DAILY    losartan (COZAAR) 100 mg tablet TAKE ONE TABLET BY MOUTH ONCE DAILY    magnesium oxide (MAG-OX) 400 mg tablet Take 1 Tab by mouth daily.  acetaminophen (TYLENOL ARTHRITIS PAIN) 650 mg TbER Take 650 mg by mouth every eight (8) hours.  cholecalciferol (VITAMIN D3) 1,000 unit cap Take  by mouth daily.  polyethylene glycol (MIRALAX) 17 gram/dose powder Take 17 g by mouth daily.         Review of Symptoms:   Constitutional: negative  Eyes: negative   Ears, nose, mouth, throat, and face: negative  Respiratory: SOB at rest and noticeably more RAZO  Cardiovascular: negative   Gastrointestinal: negative  Genitourinary:negative   Musculoskeletal:negative   Neurological: dizziness  Endocrine: negative          Objective: Visit Vitals    /74    Pulse 62    Temp 97.3 °F (36.3 °C)    Resp 11    Ht 5' 4\" (1.626 m)    Wt 72.7 kg (160 lb 4.4 oz)    SpO2 95%    BMI 27.51 kg/m2       Physical:   General: WNWD slightly obese older  female in no acute distress  Heart: RRR, no m/S3/JVD, no carotid bruits   Lungs: clear   Abdomen: Soft, +BS, NTND   Extremities: LE colette +DP/PT, no edema   Neurologic: Grossly normal    Data Review:   Recent Labs      06/14/18   1152  06/12/18   1150   WBC  3.5*  3.6*   HGB  13.0  13.8   HCT  37.8  40.9   PLT  203  210     Recent Labs      06/14/18   1010  06/12/18   1150   NA  139  138   K  3.4*  3.6   CL  104  101   CO2  28  28   GLU  108*  118*   BUN  17  20*   CREA  1.07*  1.01   CA  9.9  9.4   ALB   --   4.1   TBILI   --   0.5   SGOT   --   28   ALT   --   30   INR  1.0   --        Recent Labs      06/14/18   1423  06/14/18   1010  06/12/18   1150   TROIQ  <0.05  <0.05  <0.02   CPK   --    --   33   CKMB   --    --   <1.0       No intake or output data in the 24 hours ending 06/14/18 1532     Cardiographics    Telemetry: SR  ECG: SR with LBBB    Echocardiogram: prelim EF 40-45%    CXRAY: no acute process       Assessment:       Active Problems:    Hypertension, essential (3/1/2010)      Cervical neck pain with evidence of disc disease (3/1/2010)      SOB (shortness of breath) (6/14/2018)         Plan:     SOB, fatigue, indigestion:  Neg troponin, ECG neg (with exception of new LBBB which is unknown how long she has had), and symptoms resolved   BP on admission both here and at THE [facility] elevated and patient admits to increased anxiety with back pain  Prelim echo with EF 40-45% per Dr. Pratima Gonzales, supposedly patient had echo somewhere before. Not a great historian, anxious  OK for discharge  Continue on current medications Norvasc, Losartan, clonidine,lasix.   No need for ASA or BB and with her fatigue hesitant to start    Follow up with Dr. Eliot Mcmillan on Tuesday, 6/19/18 at 9:00AM     Thank you for consulting [facility]                    Chloé Gonzalez NP

## 2018-06-14 NOTE — ED PROVIDER NOTES
EMERGENCY DEPARTMENT HISTORY AND PHYSICAL EXAM      Date: 6/14/2018  Patient Name: Marlene Garcai    History of Presenting Illness     Chief Complaint   Patient presents with    Lethargy     Ambulatory w/ c/o being seen at [facility] Tuesday, pt said she had ECHO done & needed to follow up with cards at home, did not follow up. Symptoms have not resolved, fagtiue & SOB, no CP.  Shortness of Breath       History Provided By: Patient    HPI: Marlene Garcia, 80 y.o. female with PMHx significant for HTN, hypercholesterolemia, GERD, presents ambulatory to the ED with cc of intermittent episodes of dizziness, fatigue, and malaise x 6 days. Pt unable to adequately describe the dizziness other than a strange foreign feeling, but denies spinning sensation. She explains over the past several days she has had persistent SOB, requiring her to take deeper breaths as well as indigestion. Per chart review, pt was seen at [facility] on 6/12/18 for the same. Chart review reveals pt was found to have LBBB on EKG, but negative cardiac enzyme. Per chart review, pt was advised to stay for cardiology consult, however requested discharge home. Per daughter, pt has two appointments on 6/19 with cardiology, however presents to the ED as sxs persist.  Pt reports chronic edema of BLE, but denies any worsening or changes. She denies orthopnea or PND. She denies hx of DVT or PE as well as any recent travel, surgery, prolonged immobilization, or hormonal therapy. Pt is not currently followed by cardiology. She specifically denies any chest pain, cough, hemoptysis, fever, chills, abdominal pain, n/v/d. There are no other complaints, changes, or physical findings at this time. PCP: Orly Cordero MD    Current Outpatient Prescriptions   Medication Sig Dispense Refill    cloNIDine HCl (CATAPRES) 0.1 mg tablet Take 1 Tab by mouth two (2) times a day.  Indications: hypertension 180 Tab 3    amLODIPine (NORVASC) 5 mg tablet Take 1 Tab by mouth daily. 90 Tab 3    furosemide (LASIX) 40 mg tablet TAKE ONE TABLET BY MOUTH ONCE DAILY 90 Tab 1    atorvastatin (LIPITOR) 20 mg tablet TAKE ONE TABLET BY MOUTH ONCE DAILY 90 Tab 3    losartan (COZAAR) 100 mg tablet TAKE ONE TABLET BY MOUTH ONCE DAILY 90 Tab 3    magnesium oxide (MAG-OX) 400 mg tablet Take 1 Tab by mouth daily. 30 Tab 12    acetaminophen (TYLENOL ARTHRITIS PAIN) 650 mg TbER Take 650 mg by mouth every eight (8) hours.  cholecalciferol (VITAMIN D3) 1,000 unit cap Take  by mouth daily.  polyethylene glycol (MIRALAX) 17 gram/dose powder Take 17 g by mouth daily. Past History     Past Medical History:  Past Medical History:   Diagnosis Date    GERD (gastroesophageal reflux disease)     Hypercholesterolemia     Hypertension     SOB (shortness of breath) 6/14/2018       Past Surgical History:  Past Surgical History:   Procedure Laterality Date    ABDOMEN SURGERY PROC UNLISTED  2005    Laproscopic colon polyp excision Dr. Shayla Burciaga HX APPENDECTOMY      Age 25    HX HEENT Bilateral     cataracts    HX ORTHOPAEDIC  1996    Lumbar laminectomy    RI COLSC FLX W/RMVL OF TUMOR POLYP LESION SNARE TQ  7/18/2014            Family History:  Family History   Problem Relation Age of Onset    Heart Disease Mother     Stroke Mother     Cancer Father     Cancer Brother     Heart Disease Brother     Cancer Brother        Social History:  Social History   Substance Use Topics    Smoking status: Never Smoker    Smokeless tobacco: Never Used    Alcohol use No       Allergies:   Allergies   Allergen Reactions    Contrast Agent [Iodine] Hives    Penicillins Hives    Bystolic [Nebivolol] Other (comments)     abd pain    Cardizem [Diltiazem Hcl] Rash    Cardura [Doxazosin] Unknown (comments)    Codeine Nausea Only    Cymbalta [Duloxetine] Other (comments)     Abdominal pain, anxiety    Gabapentin Other (comments)     Made her feel crazy    Prinivil [Lisinopril] Unknown (comments)    Tekturna [Aliskiren] Other (comments)     abd cramps    Tenormin [Atenolol] Unknown (comments)         Review of Systems   Review of Systems   Constitutional: Positive for fatigue. Negative for chills and fever. + malaise   HENT: Negative for congestion, rhinorrhea and sore throat. Eyes: Negative for pain, discharge and visual disturbance. Respiratory: Positive for shortness of breath. Negative for cough, chest tightness and wheezing.         - hemoptysis   Cardiovascular: Positive for leg swelling (chronic). Negative for chest pain and palpitations. Gastrointestinal: Negative for abdominal pain, constipation, diarrhea, nausea and vomiting. Genitourinary: Negative for dysuria, frequency and hematuria. Musculoskeletal: Negative for arthralgias, back pain and myalgias. Skin: Negative for rash. Neurological: Positive for dizziness. Negative for weakness, light-headedness and headaches. Psychiatric/Behavioral: Negative. Physical Exam   Physical Exam   Constitutional: She is oriented to person, place, and time. She appears well-developed and well-nourished. No distress. HENT:   Head: Normocephalic and atraumatic. Eyes: EOM are normal. Right eye exhibits no discharge. Left eye exhibits no discharge. No scleral icterus. Neck: Normal range of motion. Neck supple. No tracheal deviation present. Cardiovascular: Normal rate, regular rhythm, normal heart sounds and intact distal pulses. Exam reveals no gallop and no friction rub. No murmur heard. Pulmonary/Chest: Effort normal and breath sounds normal. No respiratory distress. She has no wheezes. She has no rales. Abdominal: Soft. She exhibits no distension. There is no tenderness. Musculoskeletal: Normal range of motion. Trace bilateral lower extremity edema   Lymphadenopathy:     She has no cervical adenopathy. Neurological: She is alert and oriented to person, place, and time.    No focal neuro deficits   Skin: Skin is warm and dry. No rash noted. Psychiatric: She has a normal mood and affect. Nursing note and vitals reviewed.       Diagnostic Study Results     Labs -     Recent Results (from the past 12 hour(s))   EKG, 12 LEAD, INITIAL    Collection Time: 06/14/18  9:55 AM   Result Value Ref Range    Ventricular Rate 81 BPM    Atrial Rate 81 BPM    P-R Interval 204 ms    QRS Duration 138 ms    Q-T Interval 418 ms    QTC Calculation (Bezet) 485 ms    Calculated P Axis 21 degrees    Calculated R Axis -30 degrees    Calculated T Axis 129 degrees    Diagnosis       Normal sinus rhythm  Left axis deviation  Left bundle branch block  No previous ECGs available  Confirmed by Ty Echols PJOSUHA ([id number]) on 6/14/2018 84:20:00 AM     METABOLIC PANEL, BASIC    Collection Time: 06/14/18 10:10 AM   Result Value Ref Range    Sodium 139 136 - 145 mmol/L    Potassium 3.4 (L) 3.5 - 5.1 mmol/L    Chloride 104 97 - 108 mmol/L    CO2 28 21 - 32 mmol/L    Anion gap 7 5 - 15 mmol/L    Glucose 108 (H) 65 - 100 mg/dL    BUN 17 6 - 20 MG/DL    Creatinine 1.07 (H) 0.55 - 1.02 MG/DL    BUN/Creatinine ratio 16 12 - 20      GFR est AA 59 (L) >60 ml/min/1.73m2    GFR est non-AA 49 (L) >60 ml/min/1.73m2    Calcium 9.9 8.5 - 10.1 MG/DL   NT-PRO BNP    Collection Time: 06/14/18 10:10 AM   Result Value Ref Range    NT pro-BNP 74 0 - 450 PG/ML   PROTHROMBIN TIME + INR    Collection Time: 06/14/18 10:10 AM   Result Value Ref Range    INR 1.0 0.9 - 1.1      Prothrombin time 10.3 9.0 - 11.1 sec   TROPONIN I    Collection Time: 06/14/18 10:10 AM   Result Value Ref Range    Troponin-I, Qt. <0.05 <0.05 ng/mL   CK W/ REFLX CKMB    Collection Time: 06/14/18 10:10 AM   Result Value Ref Range    CK 45 26 - 192 U/L   D DIMER    Collection Time: 06/14/18 10:10 AM   Result Value Ref Range    D-dimer 0.45 0.00 - 0.65 mg/L FEU   URINALYSIS W/ RFLX MICROSCOPIC    Collection Time: 06/14/18 10:34 AM   Result Value Ref Range    Color YELLOW/STRAW Appearance CLEAR CLEAR      Specific gravity 1.012 1.003 - 1.030      pH (UA) 7.0 5.0 - 8.0      Protein NEGATIVE  NEG mg/dL    Glucose NEGATIVE  NEG mg/dL    Ketone NEGATIVE  NEG mg/dL    Bilirubin NEGATIVE  NEG      Blood NEGATIVE  NEG      Urobilinogen 0.2 0.2 - 1.0 EU/dL    Nitrites NEGATIVE  NEG      Leukocyte Esterase NEGATIVE  NEG     CBC WITH AUTOMATED DIFF    Collection Time: 06/14/18 11:52 AM   Result Value Ref Range    WBC 3.5 (L) 3.6 - 11.0 K/uL    RBC 4.37 3.80 - 5.20 M/uL    HGB 13.0 11.5 - 16.0 g/dL    HCT 37.8 35.0 - 47.0 %    MCV 86.5 80.0 - 99.0 FL    MCH 29.7 26.0 - 34.0 PG    MCHC 34.4 30.0 - 36.5 g/dL    RDW 12.8 11.5 - 14.5 %    PLATELET 489 698 - 654 K/uL    MPV 10.6 8.9 - 12.9 FL    NRBC 0.0 0  WBC    ABSOLUTE NRBC 0.00 0.00 - 0.01 K/uL    NEUTROPHILS 59 32 - 75 %    LYMPHOCYTES 25 12 - 49 %    MONOCYTES 13 5 - 13 %    EOSINOPHILS 2 0 - 7 %    BASOPHILS 1 0 - 1 %    IMMATURE GRANULOCYTES 0 0.0 - 0.5 %    ABS. NEUTROPHILS 2.0 1.8 - 8.0 K/UL    ABS. LYMPHOCYTES 0.9 0.8 - 3.5 K/UL    ABS. MONOCYTES 0.5 0.0 - 1.0 K/UL    ABS. EOSINOPHILS 0.1 0.0 - 0.4 K/UL    ABS. BASOPHILS 0.0 0.0 - 0.1 K/UL    ABS. IMM. GRANS. 0.0 0.00 - 0.04 K/UL    DF AUTOMATED     TROPONIN I    Collection Time: 06/14/18  2:23 PM   Result Value Ref Range    Troponin-I, Qt. <0.05 <0.05 ng/mL       Radiologic Studies -   CXR Results  (Last 48 hours)               06/14/18 1015  XR CHEST PA LAT Final result    Impression:  Impression: No acute process. Narrative:  Exam:  2 view chest       Indication: Shortness of breath       PA and lateral views demonstrate normal heart size. There is no acute process in   the lung fields. The osseous structures are unremarkable. Medical Decision Making   I am the first provider for this patient. I reviewed the vital signs, available nursing notes, past medical history, past surgical history, family history and social history.     Vital Signs-Reviewed the patient's vital signs. Patient Vitals for the past 12 hrs:   Temp Pulse Resp BP SpO2   06/14/18 1604 - 72 - 173/86 97 %   06/14/18 1400 - 62 11 132/74 95 %   06/14/18 1330 - 67 12 136/86 96 %   06/14/18 1300 - 70 18 138/87 96 %   06/14/18 1250 - 69 11 144/88 94 %   06/14/18 1045 - 91 17 - 96 %   06/14/18 1030 - 84 16 147/85 -   06/14/18 0950 97.3 °F (36.3 °C) 99 15 (!) 154/96 99 %       EKG interpretation: (Preliminary) 9:55  Rhythm: normal sinus rhythm and LBBB; and regular . Rate (approx.): 81; Axis: left axis deviation; [place] interval: normal; QRS interval: prolonged; ST/T wave: T wave abnormality in lateral leads; Other findings:unchanged from 6/12/18, however LBBB new since 2016  Written by Renato Paulino, ED Scribe, as dictated by Corina Reyes MD.    Records Reviewed: Nursing Notes and Old Medical Records    Provider Notes (Medical Decision Making):   DDx: heart failure, pulmonary edema, pleural effusion, ACS, PNA, PE    Disposition: Pt is a 79 y/o female who presents to ED with dyspnea and chest tightness. Troponin negative x 2. D-dimer negative. CXR negative for PNA and pulmonary edema. Cardiology evaluated pt and obtained echo, which indicated EF of 45%. Labs otherwise unremarkable. Will discharge with cardiology f/u on Tuesday as scheduled. ED Course:   Initial assessment performed. The patients presenting problems have been discussed, and they are in agreement with the care plan formulated and outlined with them. I have encouraged them to ask questions as they arise throughout their visit. CONSULT NOTE:   12:49  Corina Reyes MD spoke with Handy Gusman NP   Specialty: cardiology  Discussed pt's hx, disposition, and available diagnostic and imaging results. Reviewed care plans. Consultant agrees with plans as outlined. Handy Gusman NP states they will do an echo on pt.    Written by Kim Downey, ED Scribe, as dictated by Corina Reyes MD.    PROGRESS NOTE:  3:20 PM  Loren Lovett NP has evaluated pt. She states pt can be discharged with f/u with Dr. Ty Echols as scheduled on Tuesday. Written by Ghazal Alfonso, ED Scribe, as dictated by Brenda Estrella MD.    Critical Care Time: 0    Disposition:    DISCHARGE NOTE:  3:49 PM  The patient is ready for discharge. The patients signs, symptoms, diagnosis, and instructions for discharge have been discussed and the pt has conveyed their understanding. The patient is to follow up as recommended with cardiology or return to the ER should their symptoms worsen. Plan has been discussed and patient has conveyed their agreement. PLAN:  1. Discharge home  2. Follow-up Information     Follow up With Details Comments Contact Info    Arlin MATUTE MD On 6/19/2018 at [facility] [address] [id number] [phone]      [facility] EMERGENCY DEPT  As needed, If symptoms worsen [address] [place] [address]  [phone]        Return to ED if worse     Diagnosis     Clinical Impression:   1. Shortness of breath    2. Chest tightness        Attestations: This note is prepared by Emir Frederick and Ghazal Alfonso, acting as Scribe for Brenda Estrella MD.    The scribe's documentation has been prepared under my direction and personally reviewed by me in its entirety. I confirm that the note above accurately reflects all work, treatment, procedures, and medical decision making performed by me. Brenda Estrella MD        This note will not be viewable in [address]

## 2018-06-14 NOTE — ED NOTES
Bedside shift change report given to Nalini FOX RN (oncoming nurse) by Bria Kirk RN (offgoing nurse). Report included the following information SBAR, Kardex, ED Summary, [facility] and Recent Results.

## 2018-06-19 ENCOUNTER — OFFICE VISIT (OUTPATIENT)
Dept: CARDIOLOGY CLINIC | Age: 83
End: 2018-06-19

## 2018-06-19 VITALS
DIASTOLIC BLOOD PRESSURE: 110 MMHG | SYSTOLIC BLOOD PRESSURE: 178 MMHG | WEIGHT: 161.3 LBS | HEART RATE: 114 BPM | RESPIRATION RATE: 16 BRPM | OXYGEN SATURATION: 96 % | BODY MASS INDEX: 27.54 KG/M2 | HEIGHT: 64 IN

## 2018-06-19 DIAGNOSIS — I44.7 LBBB (LEFT BUNDLE BRANCH BLOCK): ICD-10-CM

## 2018-06-19 DIAGNOSIS — R07.89 CHEST TIGHTNESS: Primary | ICD-10-CM

## 2018-06-19 DIAGNOSIS — Z76.89 ENCOUNTER TO ESTABLISH CARE: ICD-10-CM

## 2018-06-19 DIAGNOSIS — I10 HYPERTENSION, ESSENTIAL: ICD-10-CM

## 2018-06-19 DIAGNOSIS — E78.00 HYPERCHOLESTEROLEMIA: ICD-10-CM

## 2018-06-19 DIAGNOSIS — R06.02 SOB (SHORTNESS OF BREATH): ICD-10-CM

## 2018-06-19 DIAGNOSIS — R53.83 FATIGUE, UNSPECIFIED TYPE: ICD-10-CM

## 2018-06-19 RX ORDER — RANITIDINE 150 MG/1
150 TABLET, FILM COATED ORAL AS NEEDED
COMMUNITY
Start: 2018-04-03 | End: 2020-08-12

## 2018-06-19 RX ORDER — METOPROLOL SUCCINATE 25 MG/1
12.5 TABLET, EXTENDED RELEASE ORAL DAILY
Qty: 16 TAB | Refills: 3 | Status: SHIPPED | OUTPATIENT
Start: 2018-06-19 | End: 2018-10-29 | Stop reason: SDUPTHER

## 2018-06-19 RX ORDER — GUAIFENESIN 100 MG/5ML
81 LIQUID (ML) ORAL DAILY
Qty: 30 TAB | Refills: 0
Start: 2018-06-19

## 2018-06-19 NOTE — PROGRESS NOTES
[address] [place] [address]  [phone]     Subjective: Krystal Britt is a 80 y.o. female with pmhx HTN, GERD, spinal stenosis is here to establish care and further evaluation of loyola, fatigue, dizziness, chest tightness. .  Noted ED presentation 6/14/18  where she c/o of intermittent episodes of dizziness, fatigue, chest tightness and LOYOLA x 1 wk. States she was seen at [facility] on 6/12/18 for the same. In the ED,  EKG showed LBBB with no other acute changes, troponin neg x 2, ntproBNP 74, ddimer neg, CXray clear. Echo prelim with EF 45-50%, no WMA. Patient states she cervical spondylosis which causes pain and increased anxiety. She also reports chronic edema of BLE, but denies any worsening Denies  orthopnea, PND, palpitations, syncope, or presyncope.        Cardiac risk factors: dyslipidemia, obesity, hypertension, stress, post-menopausal, family hx of CAD (mother had a heart attack, brother had heart attack and other brother valve replacement), former smoker    Patient Active Problem List    Diagnosis Date Noted    SOB (shortness of breath) 06/14/2018    Hypercholesterolemia 01/19/2017    Osteopenia with high risk of fracture 03/09/2016    Advance directive on file 11/28/2015    Tubular adenoma of colon 08/04/2014    Rotator cuff rupture 03/12/2013    GERD (gastroesophageal reflux disease) 03/14/2011    Hypertension, essential 03/01/2010    Plantar fasciitis 03/01/2010    Spinal stenosis 03/01/2010    Cervical neck pain with evidence of disc disease 03/01/2010      Cathalene Goodpasture, MD  Past Medical History:   Diagnosis Date    GERD (gastroesophageal reflux disease)     Hypercholesterolemia     Hypertension     SOB (shortness of breath) 6/14/2018      Past Surgical History:   Procedure Laterality Date    ABDOMEN SURGERY PROC UNLISTED  2005    Laproscopic colon polyp excision Dr. Eddi Lao      Age 25    HX HEENT Bilateral cataracts    HX ORTHOPAEDIC  1996    Lumbar laminectomy    LA COLSC FLX W/RMVL OF TUMOR POLYP LESION SNARE TQ  7/18/2014          Allergies   Allergen Reactions    Contrast Agent [Iodine] Hives    Penicillins Hives    Bystolic [Nebivolol] Other (comments)     abd pain    Cardizem [Diltiazem Hcl] Rash    Cardura [Doxazosin] Unknown (comments)    Codeine Nausea Only    Cymbalta [Duloxetine] Other (comments)     Abdominal pain, anxiety    Gabapentin Other (comments)     Made her feel crazy    Prinivil [Lisinopril] Unknown (comments)    Tekturna [Aliskiren] Other (comments)     abd cramps    Tenormin [Atenolol] Unknown (comments)      Family History   Problem Relation Age of Onset    Heart Disease Mother     Stroke Mother     Cancer Father     Cancer Brother     Heart Disease Brother     Cancer Brother       Social History     Social History    Marital status:      Spouse name: N/A    Number of children: N/A    Years of education: N/A     Occupational History    Not on file. Social History Main Topics    Smoking status: Never Smoker    Smokeless tobacco: Never Used    Alcohol use No    Drug use: Not on file    Sexual activity: Not on file     Other Topics Concern    Not on file     Social History Narrative      Current Outpatient Prescriptions   Medication Sig    raNITIdine (ZANTAC) 150 mg tablet     aspirin 81 mg chewable tablet Take 1 Tab by mouth daily.  metoprolol succinate (TOPROL-XL) 25 mg XL tablet Take 0.5 Tabs by mouth daily.  acetaminophen (TYLENOL ARTHRITIS PAIN) 650 mg TbER Take 650 mg by mouth every eight (8) hours.  cloNIDine HCl (CATAPRES) 0.1 mg tablet Take 1 Tab by mouth two (2) times a day. Indications: hypertension    amLODIPine (NORVASC) 5 mg tablet Take 1 Tab by mouth daily.  (Patient taking differently: Take 5 mg by mouth two (2) times a day.)    furosemide (LASIX) 40 mg tablet TAKE ONE TABLET BY MOUTH ONCE DAILY    atorvastatin (LIPITOR) 20 mg tablet TAKE ONE TABLET BY MOUTH ONCE DAILY    losartan (COZAAR) 100 mg tablet TAKE ONE TABLET BY MOUTH ONCE DAILY    cholecalciferol (VITAMIN D3) 1,000 unit cap Take  by mouth daily.  magnesium oxide (MAG-OX) 400 mg tablet Take 1 Tab by mouth daily.  polyethylene glycol (MIRALAX) 17 gram/dose powder Take 17 g by mouth daily. No current facility-administered medications for this visit. Review of Symptoms:  11 systems reviewed, negative other than as stated in the HPI    Physical ExamPhysical Exam:    Vitals:    06/19/18 0903 06/19/18 0913   BP: (!) 180/110 (!) 178/110   Pulse: (!) 114    Resp: 16    SpO2: 96%    Weight: 161 lb 4.8 oz (73.2 kg)    Height: 5' 4\" (1.626 m)      Body mass index is 27.69 kg/(m^2). General PE   Gen:  NAD  Mental Status - Alert. General Appearance - Not in acute distress. Chest and Lung Exam   Inspection: Accessory muscles - No use of accessory muscles in breathing. Auscultation:   Breath sounds: - Normal.   Cardiovascular   Inspection: Jugular vein - Bilateral - Inspection Normal.   Palpation/Percussion:   Apical Impulse: - Normal.   Auscultation: Rhythm - Regular. Heart Sounds - S1 WNL and S2 WNL. No S3 or S4. Murmurs & Other Heart Sounds: Auscultation of the heart reveals - No Murmurs. Peripheral Vascular   Upper Extremity: Inspection - Bilateral - No Cyanotic nailbeds or Digital clubbing. Lower Extremity:   Palpation: Edema - Bilateral - trace chronic dependent edema  Abdomen:   Soft, non-tender, bowel sounds are active.   Neuro: A&O times 3, CN and motor grossly WNL    Labs:   Lab Results   Component Value Date/Time    Cholesterol, total 148 07/19/2017 12:00 AM    Cholesterol, total 158 07/12/2016 07:48 AM    Cholesterol, total 154 02/12/2015 12:00 AM    Cholesterol, total 167 07/14/2014 12:00 AM    Cholesterol, total 182 01/24/2014 09:22 AM    HDL Cholesterol 43 07/19/2017 12:00 AM    HDL Cholesterol 45 07/12/2016 07:48 AM    HDL Cholesterol 42 02/12/2015 12:00 AM    HDL Cholesterol 47 07/14/2014 12:00 AM    HDL Cholesterol 54 01/24/2014 09:22 AM    LDL, calculated 82 07/19/2017 12:00 AM    LDL, calculated 88 07/12/2016 07:48 AM    LDL, calculated 93 02/12/2015 12:00 AM    LDL, calculated 101 (H) 07/14/2014 12:00 AM    LDL, calculated 105 (H) 01/24/2014 09:22 AM    Triglyceride 115 07/19/2017 12:00 AM    Triglyceride 127 07/12/2016 07:48 AM    Triglyceride 94 02/12/2015 12:00 AM    Triglyceride 95 07/14/2014 12:00 AM    Triglyceride 113 01/24/2014 09:22 AM     Lab Results   Component Value Date/Time    CK 33 06/12/2018 11:50 AM     Lab Results   Component Value Date/Time    Sodium 139 06/14/2018 10:10 AM    Potassium 3.4 (L) 06/14/2018 10:10 AM    Chloride 104 06/14/2018 10:10 AM    CO2 28 06/14/2018 10:10 AM    Anion gap 7 06/14/2018 10:10 AM    Glucose 108 (H) 06/14/2018 10:10 AM    BUN 17 06/14/2018 10:10 AM    Creatinine 1.07 (H) 06/14/2018 10:10 AM    BUN/Creatinine ratio 16 06/14/2018 10:10 AM    GFR est AA 59 (L) 06/14/2018 10:10 AM    GFR est non-AA 49 (L) 06/14/2018 10:10 AM    Calcium 9.9 06/14/2018 10:10 AM    Bilirubin, total 0.5 06/12/2018 11:50 AM    AST (SGOT) 28 06/12/2018 11:50 AM    Alk. phosphatase 100 06/12/2018 11:50 AM    Protein, total 7.8 06/12/2018 11:50 AM    Albumin 4.1 06/12/2018 11:50 AM    Globulin 3.7 06/12/2018 11:50 AM    A-G Ratio 1.1 06/12/2018 11:50 AM    ALT (SGPT) 30 06/12/2018 11:50 AM       EKG  SR LBBB     Assessment:     Assessment:      1. Chest tightness    2. SOB (shortness of breath)    3. Fatigue, unspecified type    4. Encounter to establish care    5. LBBB (left bundle branch block)    6. Hypertension, essential    7.  Hypercholesterolemia        Orders Placed This Encounter    AMB POC EKG ROUTINE W/ 12 LEADS, INTER & REP     Order Specific Question:   Reason for Exam:     Answer:   routine    LEXISCAN/CARDIOLITE, Clinic Performed     Standing Status:   Future     Standing Expiration Date:   12/19/2018 Order Specific Question:   Reason for Exam:     Answer:   razo    raNITIdine (ZANTAC) 150 mg tablet    aspirin 81 mg chewable tablet     Sig: Take 1 Tab by mouth daily. Dispense:  30 Tab     Refill:  0    metoprolol succinate (TOPROL-XL) 25 mg XL tablet     Sig: Take 0.5 Tabs by mouth daily. Dispense:  16 Tab     Refill:  3        Plan:     Pt is an 80 y.o. female with pmhx HTN, GERD, spinal stenosis is here to establish care and further evaluation of razo, fatigue, dizziness, chest tightness. RAZO, dizziness,  Fatigue, chest tightness   Cardiac risk factors: dyslipidemia, obesity, hypertension, stress, post-menopausal, family hx of CAD (mother had a heart attack, brother had heart attack and other brother valve replacement), former smoker  Neg troponin, EKG LBBB in ED 6/14/18  EF 45-50%, mild MR per echo 6/18  Will start baby ASA, Toprol XL 12.5 mg. She denies allergy to Atenolol or Bystolic. Continue CCB, statin  Will obtain Lexiscan       HTN  Elevated. Has not taken any of her medications. In clinic, given 0.1 mg of Clonidine  Continue on current medications Norvasc, Losartan, clonidine,lasix. HLD  7/17 LDL at 82. On statin  Lipids and labs followed by PCP        Continue current care and f/u when testing complete    Tereza Chamorro NP       [facility]    6/19/2018         Patient seen, examined by me personally. Plan discussed as detailed. Agree with note as outlined by  NP. I confirm findings in history and physical exam. No additional findings noted. Agree with plan as outlined above. Patient presents with SOB, abnormal echo, new LBBB. Will evaluate with pharmacologic stress test. Discussed cath/PCI based on stress test results.     [address] MD

## 2018-06-19 NOTE — PROGRESS NOTES
Chief Complaint   Patient presents with   [facility] f/u 06/14/2018, referred by Josh Omalley NP       Have you seen or consulted any other health care providers outside of the [address] [place] [address] since your last visit? Include any pap smears or colon screening.  No

## 2018-06-19 NOTE — MR AVS SNAPSHOT
Helen Chapin [address] [place] [address] 
[phone] Patient: Cristela Alvarez MRN:  [id number] Visit Information Date & Time Provider Department Dept. Phone Encounter #  
 6/19/2018  9:00 AM Anna Yan MD [facility] [phone] [id number] Your Appointments 7/25/2018  9:15 AM  
ACUTE CARE with Anthony Clifford MD  
[facility] Appt Note: 6wk f/u;  HTN  
 [address] [facility] [id number] [phone]  
  
   
 [address] [facility] Upcoming Health Maintenance Date Due  
 GLAUCOMA SCREENING Q2Y 3/1/2018 Influenza Age 5 to Adult 8/1/2018 MEDICARE YEARLY EXAM 4/17/2019 DTaP/Tdap/Td series (2 - Td) 7/16/2023 Allergies as of 6/19/2018  Review Complete On: 6/19/2018 By: Lenora Parisi NP Severity Noted Reaction Type Reactions Contrast Agent [Iodine] High 03/01/2010    Hives Penicillins High 03/01/2010    Hives Bystolic [Nebivolol]  15/76/4999    Other (comments)  
 abd pain  
 Cardizem [Diltiazem Hcl]  03/01/2010    Rash Cardura [Doxazosin]  03/01/2010    Unknown (comments) Codeine  03/01/2010    Nausea Only Cymbalta [Duloxetine]  01/19/2017    Other (comments) Abdominal pain, anxiety Gabapentin  03/02/2017    Other (comments) Made her feel crazy Prinivil [Lisinopril]  03/01/2010    Unknown (comments) Tekturna [Aliskiren]  03/01/2010    Other (comments)  
 abd cramps Tenormin [Atenolol]  03/01/2010    Unknown (comments) Current Immunizations  Reviewed on 6/4/2018 Name Date Influenza High Dose Vaccine PF 12/12/2016, 10/26/2015, 10/3/2014 Influenza Vaccine 9/17/2013 Influenza Vaccine Lou ) 11/24/2017 Pneumococcal Conjugate (PCV-13) 4/3/2015 TD Vaccine 6/1/2007 Tdap 7/16/2013 ZZZ-RETIRED (DO NOT USE) Pneumococcal Vaccine (Unspecified Type) 10/1/2002 Zoster 6/30/2009 Not reviewed this visit You Were Diagnosed With   
  
 Codes Comments Chest tightness    -  Primary ICD-10-CM: R07.89 ICD-9-CM: 786.59   
 SOB (shortness of breath)     ICD-10-CM: R06.02 
ICD-9-CM: 786.05 Fatigue, unspecified type     ICD-10-CM: R53.83 ICD-9-CM: 780.79 Encounter to establish care     ICD-10-CM: Z76.89 
ICD-9-CM: V65.8 LBBB (left bundle branch block)     ICD-10-CM: I44.7 ICD-9-CM: 426.3 Hypertension, essential     ICD-10-CM: I10 
ICD-9-CM: 401.9 Hypercholesterolemia     ICD-10-CM: E78.00 ICD-9-CM: 272.0 Vitals BP Pulse Resp Height(growth percentile) Weight(growth percentile) SpO2  
 (!) 178/110 (BP 1 Location: Right arm, BP Patient Position: Sitting) (!) 114 16 5' 4\" (1.626 m) 161 lb 4.8 oz (73.2 kg) 96% BMI OB Status Smoking Status 27.69 kg/m2 Postmenopausal Never Smoker Vitals History BMI and BSA Data Body Mass Index Body Surface Area  
 27.69 kg/m 2 1.82 m 2 Preferred Pharmacy Pharmacy Name Phone [address] [phone] Your Updated Medication List  
  
   
This list is accurate as of 6/19/18  9:56 AM.  Always use your most recent med list. amLODIPine 5 mg tablet Commonly known as:  Trini Chimes Take 1 Tab by mouth daily. aspirin 81 mg chewable tablet Take 1 Tab by mouth daily. atorvastatin 20 mg tablet Commonly known as:  LIPITOR  
TAKE ONE TABLET BY MOUTH ONCE DAILY  
  
 cholecalciferol 1,000 unit Cap Commonly known as:  VITAMIN D3 Take  by mouth daily. cloNIDine HCl 0.1 mg tablet Commonly known as:  CATAPRES Take 1 Tab by mouth two (2) times a day. Indications: hypertension  
  
 furosemide 40 mg tablet Commonly known as:  LASIX TAKE ONE TABLET BY MOUTH ONCE DAILY losartan 100 mg tablet Commonly known as:  COZAAR  
TAKE ONE TABLET BY MOUTH ONCE DAILY  
  
 magnesium oxide 400 mg tablet Commonly known as:  MAG-OX Take 1 Tab by mouth daily. metoprolol succinate 25 mg XL tablet Commonly known as:  TOPROL-XL Take 0.5 Tabs by mouth daily. MIRALAX 17 gram/dose powder Generic drug:  polyethylene glycol Take 17 g by mouth daily. raNITIdine 150 mg tablet Commonly known as:  ZANTAC TYLENOL ARTHRITIS PAIN 650 mg [place] Generic drug:  acetaminophen Take 650 mg by mouth every eight (8) hours. Prescriptions Sent to Pharmacy Refills  
 metoprolol succinate (TOPROL-XL) 25 mg XL tablet 3 Sig: Take 0.5 Tabs by mouth daily. Class: Normal  
 Pharmacy: [address] [place] [address] #: [phone] Route: Oral  
  
We Performed the Following AMB POC EKG ROUTINE W/ 12 LEADS, INTER & REP [98382 CPT(R)] To-Do List   
 06/19/2018 ECG:  STRESS TEST LEXISCAN/CARDIOLITE Introducing [facility]! [facility] introduces Vers[employer] patient portal. Now you can access parts of your medical record, email your doctor's office, and request medication refills online. 1. In your internet browser, go to [email or web address] 2. Click on the First Time User? Click Here link in the Sign In box. You will see the New Member Sign Up page. 3. Enter your Versly Access Code exactly as it appears below. You will not need to use this code after youve completed the sign-up process. If you do not sign up before the expiration date, you must request a new code. · Versly Access Code: [email or web address] Expires: 7/15/2018  4:24 PM 
 
4. Enter the last four digits of your Social Security Number (xxxx) and Date of Birth (mm/dd/yyyy) as indicated and click Submit. You will be taken to the next sign-up page. 5. Create a Versly ID.  This will be your Versly login ID and cannot be changed, so think of one that is secure and easy to remember. 6. Create a [employer] password. You can change your password at any time. 7. Enter your Password Reset Question and Answer. This can be used at a later time if you forget your password. 8. Enter your e-mail address. You will receive e-mail notification when new information is available in [address] 9. Click Sign Up. You can now view and download portions of your medical record. 10. Click the Download Summary menu link to download a portable copy of your medical information. If you have questions, please visit the Frequently Asked Questions section of the [employer] website. Remember, [employer] is NOT to be used for urgent needs. For medical emergencies, dial 911. Now available from your iPhone and Android! Please provide this summary of care documentation to your next provider. Your primary care clinician is listed as Maxine Mallory. If you have any questions after today's visit, please call [phone].

## 2018-06-25 ENCOUNTER — CLINICAL SUPPORT (OUTPATIENT)
Dept: CARDIOLOGY CLINIC | Age: 83
End: 2018-06-25

## 2018-06-25 DIAGNOSIS — Z76.89 ENCOUNTER TO ESTABLISH CARE: ICD-10-CM

## 2018-06-25 DIAGNOSIS — R07.89 CHEST TIGHTNESS: ICD-10-CM

## 2018-06-25 DIAGNOSIS — R06.02 SOB (SHORTNESS OF BREATH): ICD-10-CM

## 2018-06-25 DIAGNOSIS — I44.7 LBBB (LEFT BUNDLE BRANCH BLOCK): ICD-10-CM

## 2018-06-25 DIAGNOSIS — I10 HYPERTENSION, ESSENTIAL: ICD-10-CM

## 2018-06-25 DIAGNOSIS — R93.1 ABNORMAL NUCLEAR CARDIAC IMAGING TEST: Primary | ICD-10-CM

## 2018-06-25 DIAGNOSIS — R53.83 FATIGUE, UNSPECIFIED TYPE: ICD-10-CM

## 2018-06-25 DIAGNOSIS — E78.00 HYPERCHOLESTEROLEMIA: ICD-10-CM

## 2018-06-26 NOTE — PROGRESS NOTES
Verified patient with two identifiers. Spoke with patient regarding STRESS test results. Pt will discuss with daughter and call back .

## 2018-06-26 NOTE — PROGRESS NOTES
Stress test abnormal. Will need cath. Can set up with Teofilo Matias if patient okay or f/u to discuss.  thx.

## 2018-07-02 ENCOUNTER — OFFICE VISIT (OUTPATIENT)
Dept: INTERNAL MEDICINE CLINIC | Facility: CLINIC | Age: 83
End: 2018-07-02

## 2018-07-02 VITALS
RESPIRATION RATE: 18 BRPM | SYSTOLIC BLOOD PRESSURE: 146 MMHG | DIASTOLIC BLOOD PRESSURE: 85 MMHG | BODY MASS INDEX: 26.98 KG/M2 | TEMPERATURE: 98.5 F | WEIGHT: 158 LBS | OXYGEN SATURATION: 100 % | HEIGHT: 64 IN | HEART RATE: 114 BPM

## 2018-07-02 DIAGNOSIS — N30.01 ACUTE CYSTITIS WITH HEMATURIA: Primary | ICD-10-CM

## 2018-07-02 DIAGNOSIS — R31.9 HEMATURIA, UNSPECIFIED TYPE: ICD-10-CM

## 2018-07-02 LAB
BILIRUB UR QL STRIP: NORMAL
GLUCOSE UR-MCNC: NEGATIVE MG/DL
KETONES P FAST UR STRIP-MCNC: NEGATIVE MG/DL
PH UR STRIP: 5.5 [PH] (ref 4.6–8)
PROT UR QL STRIP: NORMAL
SP GR UR STRIP: 1.02 (ref 1–1.03)
UA UROBILINOGEN AMB POC: NORMAL (ref 0.2–1)
URINALYSIS CLARITY POC: CLEAR
URINALYSIS COLOR POC: YELLOW
URINE BLOOD POC: NORMAL
URINE LEUKOCYTES POC: NORMAL
URINE NITRITES POC: NEGATIVE

## 2018-07-02 RX ORDER — CIPROFLOXACIN 250 MG/1
250 TABLET, FILM COATED ORAL 2 TIMES DAILY
Qty: 14 TAB | Refills: 0 | Status: SHIPPED | OUTPATIENT
Start: 2018-07-02 | End: 2018-07-09

## 2018-07-02 NOTE — MR AVS SNAPSHOT
[address] [address] [phone] Patient: Sofia Berman MRN: [id number] Visit Information Date & Time Provider Department Dept. Phone Encounter #  
 7/2/2018 11:10 AM MD [facility] [address] [id number] Follow-up Instructions Return if symptoms worsen or fail to improve, for Scheduled appointment with Dr. Sherice Gutierrez on 7/25/18. Your Appointments 7/25/2018  9:15 AM  
ACUTE CARE with MD Abby Kim [facility]) Appt Note: 6wk f/u;  HTN  
 [address] [address] [phone]  
  
   
 [address] [facility] Upcoming Health Maintenance Date Due  
 GLAUCOMA SCREENING Q2Y 3/1/2018 Influenza Age 5 to Adult 8/1/2018 MEDICARE YEARLY EXAM 4/17/2019 DTaP/Tdap/Td series (2 - Td) 7/16/2023 Allergies as of 7/2/2018  Review Complete On: 7/2/2018 By: Qamar Khanna MD  
  
 Severity Noted Reaction Type Reactions Contrast Agent [Iodine] High 03/01/2010    Hives Penicillins High 03/01/2010    Hives Bystolic [Nebivolol]  04/36/6590    Other (comments)  
 abd pain  
 Cardizem [Diltiazem Hcl]  03/01/2010    Rash Cardura [Doxazosin]  03/01/2010    Unknown (comments) Codeine  03/01/2010    Nausea Only Cymbalta [Duloxetine]  01/19/2017    Other (comments) Abdominal pain, anxiety Gabapentin  03/02/2017    Other (comments) Made her feel crazy Prinivil [Lisinopril]  03/01/2010    Unknown (comments) Tekturna [Aliskiren]  03/01/2010    Other (comments)  
 abd cramps Tenormin [Atenolol]  03/01/2010    Unknown (comments) Current Immunizations  Reviewed on 6/4/2018 Name Date Influenza High Dose Vaccine PF 12/12/2016, 10/26/2015, 10/3/2014 Influenza Vaccine 9/17/2013 Influenza Vaccine Mary Alan 11/24/2017 Pneumococcal Conjugate (PCV-13) 4/3/2015 TD Vaccine 6/1/2007 Tdap 7/16/2013 ZZZ-RETIRED (DO NOT USE) Pneumococcal Vaccine (Unspecified Type) 10/1/2002 Zoster 6/30/2009 Not reviewed this visit You Were Diagnosed With   
  
 Codes Comments Acute cystitis with hematuria    -  Primary ICD-10-CM: N30.01 
ICD-9-CM: 595.0 Hematuria, unspecified type     ICD-10-CM: R31.9 ICD-9-CM: 599.70 Vitals BP Pulse Temp Resp Height(growth percentile) Weight(growth percentile) 146/85 (!) 114 98.5 °F (36.9 °C) (Oral) 18 5' 4\" (1.626 m) 158 lb (71.7 kg) SpO2 BMI OB Status Smoking Status 100% 27.12 kg/m2 Postmenopausal Never Smoker Vitals History BMI and BSA Data Body Mass Index Body Surface Area  
 27.12 kg/m 2 1.8 m 2 Preferred Pharmacy Pharmacy Name Phone [address] [phone] Your Updated Medication List  
  
   
This list is accurate as of 7/2/18 11:33 AM.  Always use your most recent med list. amLODIPine 5 mg tablet Commonly known as:  Garrett Royals Take 1 Tab by mouth daily. aspirin 81 mg chewable tablet Take 1 Tab by mouth daily. atorvastatin 20 mg tablet Commonly known as:  LIPITOR  
TAKE ONE TABLET BY MOUTH ONCE DAILY  
  
 cholecalciferol 1,000 unit Cap Commonly known as:  VITAMIN D3 Take  by mouth daily. ciprofloxacin HCl 250 mg tablet Commonly known as:  CIPRO Take 1 Tab by mouth two (2) times a day for 7 days. Indications: BACTERIAL URINARY TRACT INFECTION  
  
 cloNIDine HCl 0.1 mg tablet Commonly known as:  CATAPRES Take 1 Tab by mouth two (2) times a day. Indications: hypertension  
  
 furosemide 40 mg tablet Commonly known as:  LASIX TAKE ONE TABLET BY MOUTH ONCE DAILY losartan 100 mg tablet Commonly known as:  COZAAR  
TAKE ONE TABLET BY MOUTH ONCE DAILY  
  
 magnesium oxide 400 mg tablet Commonly known as:  MAG-OX Take 1 Tab by mouth daily. metoprolol succinate 25 mg XL tablet Commonly known as:  TOPROL-XL Take 0.5 Tabs by mouth daily. MIRALAX 17 gram/dose powder Generic drug:  polyethylene glycol Take 17 g by mouth daily. raNITIdine 150 mg tablet Commonly known as:  ZANTAC TYLENOL ARTHRITIS PAIN 650 mg Randy Alianza Generic drug:  acetaminophen Take 650 mg by mouth every eight (8) hours. Prescriptions Sent to Pharmacy Refills  
 ciprofloxacin HCl (CIPRO) 250 mg tablet 0 Sig: Take 1 Tab by mouth two (2) times a day for 7 days. Indications: BACTERIAL URINARY TRACT INFECTION Class: Normal  
 Pharmacy: [facility] [address] MAYANK DEL RIO [address] [place] [address] Ph #: [phone] Route: Oral  
  
We Performed the Following AMB POC URINALYSIS DIP STICK AUTO W/O MICRO [01550 CPT(R)] Follow-up Instructions Return if symptoms worsen or fail to improve, for Scheduled appointment with Dr. Chaim Toro on 7/25/18. To-Do List   
 07/05/2018 8:15 AM  
  Appointment with CATH LAB 2 [facility] at [facility] CARDIAC CATH LAB ([phone]) NPO AFTER MIDNIGHT! ROUTINE CASES:  Please arrive 2 hours prior to your scheduled appointment time. If your scheduled appointment is for 7:30am, 8:00am or 8:15am, please arrive by 6:45am.  NON ROUTINE CASES:  1. If you require labs, x-ray, EKG or meds-please arrive 3 hours prior to your appointment time. 2.  If you require hydration prior to your procedure-please arrive 4 hours prior to your appointment time. ** It is the [employer] responsibility to notify the Cath Lab  of any prep required outside of the normal routine case**   Patient needs to arrive 2 hours before their appointment time. IF Pt requires LABS, EKG, MEDS, X-RAY, please arrive 3 hours prior to appontment time. IF Pt requires HYDRATION, please arrive 4 hours prior to appointment time. Patient Instructions Urinary Tract Infection in Women: Care Instructions Your Care Instructions A urinary tract infection, or UTI, is a general term for an infection anywhere between the kidneys and the urethra (where urine comes out). Most UTIs are bladder infections. They often cause pain or burning when you urinate. UTIs are caused by bacteria and can be cured with antibiotics. Be sure to complete your treatment so that the infection goes away. Follow-up care is a key part of your treatment and safety. Be sure to make and go to all appointments, and call your doctor if you are having problems. It's also a good idea to know your test results and keep a list of the medicines you take. How can you care for yourself at home? · Take your antibiotics as directed. Do not stop taking them just because you feel better. You need to take the full course of antibiotics. · Drink extra water and other fluids for the next day or two. This may help wash out the bacteria that are causing the infection. (If you have kidney, heart, or liver disease and have to limit fluids, talk with your doctor before you increase your fluid intake.) · Avoid drinks that are carbonated or have caffeine. They can irritate the bladder. · Urinate often. Try to empty your bladder each time. · To relieve pain, take a hot bath or lay a heating pad set on low over your lower belly or genital area. Never go to sleep with a heating pad in place. To prevent UTIs · Drink plenty of water each day. This helps you urinate often, which clears bacteria from your system. (If you have kidney, heart, or liver disease and have to limit fluids, talk with your doctor before you increase your fluid intake.) · Urinate when you need to. · Urinate right after you have sex. · Change sanitary pads often. · Avoid douches, bubble baths, feminine hygiene sprays, and other feminine hygiene products that have deodorants. · After going to the bathroom, wipe from front to back. When should you call for help? Call your doctor now or seek immediate medical care if: 
? · Symptoms such as fever, chills, nausea, or vomiting get worse or appear for the first time. ? · You have new pain in your back just below your rib cage. This is called flank pain. ? · There is new blood or pus in your urine. ? · You have any problems with your antibiotic medicine. ? Watch closely for changes in your health, and be sure to contact your doctor if: 
? · You are not getting better after taking an antibiotic for 2 days. ? · Your symptoms go away but then come back. Where can you learn more? Go to http://hua-skip.info/. Enter F818 in the search box to learn more about \"Urinary Tract Infection in Women: Care Instructions. \" Current as of: May 12, 2017 Content Version: 11.4 © 3353-0104 [employer]. Care instructions adapted under license by [employer] (which disclaims liability or warranty for this information). If you have questions about a medical condition or this instruction, always ask your healthcare professional. [address] any warranty or liability for your use of this information. Introducing [facility]! [facility] introduces [employer] patient portal. Now you can access parts of your medical record, email your doctor's office, and request medication refills online. 1. In your internet browser, go to [email or web address] 2. Click on the First Time User? Click Here link in the Sign In box. You will see the New Member Sign Up page. 3. Enter your [employer] Access Code exactly as it appears below. You will not need to use this code after youve completed the sign-up process. If you do not sign up before the expiration date, you must request a new code. · [employer] Access Code: [email or web address] Expires: 7/15/2018  4:24 PM 
 
 4. Enter the last four digits of your Social Security Number (xxxx) and Date of Birth (mm/dd/yyyy) as indicated and click Submit. You will be taken to the next sign-up page. 5. Create a [employer] ID. This will be your [employer] login ID and cannot be changed, so think of one that is secure and easy to remember. 6. Create a [employer] password. You can change your password at any time. 7. Enter your Password Reset Question and Answer. This can be used at a later time if you forget your password. 8. Enter your e-mail address. You will receive e-mail notification when new information is available in [address] 9. Click Sign Up. You can now view and download portions of your medical record. 10. Click the Download Summary menu link to download a portable copy of your medical information. If you have questions, please visit the Frequently Asked Questions section of the [employer] website. Remember, [employer] is NOT to be used for urgent needs. For medical emergencies, dial 911. Now available from your iPhone and Android! Please provide this summary of care documentation to your next provider. Your primary care clinician is listed as Patrica Pearce. If you have any questions after today's visit, please call [phone].

## 2018-07-02 NOTE — PROGRESS NOTES
CC:   Chief Complaint   Patient presents with    Other     blood when wiping/ blood on mimipad-has heart cath tomorrow       [address] is a 80 y.o. female. Patient complains of noticing blood when wiping after urinating this morning. She has urinary urgency. Denies dysuria, urinary frequency, suprapubic abdominal pain, back pain, fevers, or chills; denies history of frequent UTI's. Patient thought the bleeding this morning was due to using a different type of incontinence pad. Her daughter urged here to come in to clinic for evaluation. Patient is scheduled for cardiac catheterization on 7/5/18 with Dr. Darvin Thomas.        Patient Active Problem List   Diagnosis Code    Hypertension, essential I10    Plantar fasciitis M72.2    Spinal stenosis M48.00    Cervical neck pain with evidence of disc disease M50.90    GERD (gastroesophageal reflux disease) K21.9    Rotator cuff rupture M75.100    Tubular adenoma of colon D12.6    Advance directive on file Z78.9    Osteopenia with high risk of fracture M85.80    Hypercholesterolemia E78.00    SOB (shortness of breath) R06.02     Past Medical History:   Diagnosis Date    GERD (gastroesophageal reflux disease)     Hypercholesterolemia     Hypertension     SOB (shortness of breath) 6/14/2018     Allergies   Allergen Reactions    Contrast Agent [Iodine] Hives    Penicillins Hives    Bystolic [Nebivolol] Other (comments)     abd pain    Cardizem [Diltiazem Hcl] Rash    Cardura [Doxazosin] Unknown (comments)    Codeine Nausea Only    Cymbalta [Duloxetine] Other (comments)     Abdominal pain, anxiety    Gabapentin Other (comments)     Made her feel crazy    Prinivil [Lisinopril] Unknown (comments)    Tekturna [Aliskiren] Other (comments)     abd cramps    Tenormin [Atenolol] Unknown (comments)       Current Outpatient Prescriptions   Medication Sig Dispense Refill    raNITIdine (ZANTAC) 150 mg tablet       aspirin 81 mg chewable tablet Take 1 Tab by mouth daily. 30 Tab 0    metoprolol succinate (TOPROL-XL) 25 mg XL tablet Take 0.5 Tabs by mouth daily. 16 Tab 3    acetaminophen (TYLENOL ARTHRITIS PAIN) 650 mg TbER Take 650 mg by mouth every eight (8) hours.  cloNIDine HCl (CATAPRES) 0.1 mg tablet Take 1 Tab by mouth two (2) times a day. Indications: hypertension 180 Tab 3    amLODIPine (NORVASC) 5 mg tablet Take 1 Tab by mouth daily. (Patient taking differently: Take 5 mg by mouth two (2) times a day.) 90 Tab 3    furosemide (LASIX) 40 mg tablet TAKE ONE TABLET BY MOUTH ONCE DAILY 90 Tab 1    atorvastatin (LIPITOR) 20 mg tablet TAKE ONE TABLET BY MOUTH ONCE DAILY 90 Tab 3    losartan (COZAAR) 100 mg tablet TAKE ONE TABLET BY MOUTH ONCE DAILY 90 Tab 3    cholecalciferol (VITAMIN D3) 1,000 unit cap Take  by mouth daily.  magnesium oxide (MAG-OX) 400 mg tablet Take 1 Tab by mouth daily. 30 Tab 12    polyethylene glycol (MIRALAX) 17 gram/dose powder Take 17 g by mouth daily. PHYSICAL EXAM  Visit Vitals    /85    Pulse (!) 114    Temp 98.5 °F (36.9 °C) (Oral)    Resp 18    Ht 5' 4\" (1.626 m)    Wt 158 lb (71.7 kg)    SpO2 100%    BMI 27.12 kg/m2       General: Well-developed and well-nourished, no distress. Lungs:  Clear to ausculation bilaterally. Good air movement. Heart:  Tachycardic, normal S1 and S2, no murmur, gallop, or rub  Abdomen: Soft, non-distended, normal bowel sounds, no tenderness. Back: No CVAT. Psychiatric: Slightly anxious.  Behavior is normal.     Results for orders placed or performed in visit on 07/02/18   AMB POC URINALYSIS DIP STICK AUTO W/O MICRO   Result Value Ref Range    Color (UA POC) Yellow     Clarity (UA POC) Clear     Glucose (UA POC) Negative Negative    Bilirubin (UA POC) 1+ Negative    Ketones (UA POC) Negative Negative    Specific gravity (UA POC) 1.020 1.001 - 1.035    Blood (UA POC) 3+ Negative    pH (UA POC) 5.5 4.6 - 8.0    Protein (UA POC) 3+ Negative    Urobilinogen (UA POC) 0.2 mg/dL 0.2 - 1    Nitrites (UA POC) Negative Negative    Leukocyte esterase (UA POC) 1+ Negative         ASSESSMENT AND PLAN    ICD-10-CM ICD-9-CM    1. Acute cystitis with hematuria N30.01 595.0 ciprofloxacin HCl (CIPRO) 250 mg tablet      CULTURE, URINE   2. Hematuria, unspecified type R31.9 599.70 AMB POC URINALYSIS DIP STICK AUTO W/O MICRO       Diagnoses and all orders for this visit:    1. Acute cystitis with hematuria  -     Start ciprofloxacin HCl (CIPRO) 250 mg tablet; Take 1 Tab by mouth two (2) times a day for 7 days. Indications: BACTERIAL URINARY TRACT INFECTION  -     CULTURE, URINE    2. Hematuria, unspecified type  -     AMB POC URINALYSIS DIP STICK AUTO W/O MICRO      I have copied Dr. Anselmo Grover on this note. His nurse should notify patient if her cardiac catheterization needs to be rescheduled. Follow-up Disposition:  Return if symptoms worsen or fail to improve, for Scheduled appointment with Dr. Mihir Silva on 7/25/18. Provided patient and/or family with advanced directive information and answered pertinent questions. Encouraged patient to provide a copy of advanced directive to the office when available. I have discussed the diagnosis with the patient and the intended plan as seen in the above orders. Patient is in agreement. The patient has received an after-visit summary and questions were answered concerning future plans. I have discussed medication side effects and warnings with the patient as well.

## 2018-07-02 NOTE — PATIENT INSTRUCTIONS

## 2018-07-02 NOTE — PROGRESS NOTES
Garcia Rucker  Identified pt with two pt identifiers(name and ). Chief Complaint   Patient presents with    Other     blood when wiping/ blood on mimipad-has heart cath tomorrow       1. Have you been to the ER, urgent care clinic since your last visit? Hospitalized since your last visit? Seen at [address]   2. Have you seen or consulted any other health care providers outside of the [address] [place] [address] since your last visit? Include any pap smears or colon screening. Dr Rose Ratliff provider has been notified of reason for visit, vitals and flowsheets obtained on patients. Advanced directives on file. Per Dr. Don Mcmillan verbal order read back orders placed for urine dip.     Reviewed record In preparation for visit, huddled with provider and have obtained necessary documentation      Health Maintenance Due   Topic    GLAUCOMA SCREENING Q2Y        Wt Readings from Last 3 Encounters:   18 158 lb (71.7 kg)   18 161 lb 4.8 oz (73.2 kg)   18 160 lb 4.4 oz (72.7 kg)     Temp Readings from Last 3 Encounters:   18 98.5 °F (36.9 °C) (Oral)   18 97.3 °F (36.3 °C)   18 98 °F (36.7 °C)     BP Readings from Last 3 Encounters:   18 146/85   18 (!) 178/110   18 173/86     Pulse Readings from Last 3 Encounters:   18 (!) 114   18 (!) 114   18 72     Vitals:    18 1104 18 1107   BP: 145/90 146/85   Pulse: (!) 114    Resp: 18    Temp: 98.5 °F (36.9 °C)    TempSrc: Oral    SpO2: 100%    Weight: 158 lb (71.7 kg)    Height: 5' 4\" (1.626 m)    PainSc:   0 - No pain          Learning Assessment:  :     Learning Assessment 2014   PRIMARY LEARNER Patient   HIGHEST LEVEL OF EDUCATION - PRIMARY LEARNER  GRADUATED HIGH SCHOOL OR GED   BARRIERS PRIMARY LEARNER NONE   CO-LEARNER CAREGIVER No   PRIMARY LANGUAGE ENGLISH   LEARNER PREFERENCE PRIMARY DEMONSTRATION   ANSWERED BY patient   RELATIONSHIP SELF       Depression Screening:  :     PHQ over the last two weeks 4/16/2018   Little interest or pleasure in doing things Not at all   Feeling down, depressed or hopeless Not at all   Total Score PHQ 2 0       Fall Risk Assessment:  :     Fall Risk Assessment, last 12 mths 4/16/2018   Able to walk? Yes   Fall in past 12 months? No       Abuse Screening:  :     Abuse Screening Questionnaire 4/16/2018 1/19/2017 10/26/2015 8/5/2014   Do you ever feel afraid of your partner? N N N N   Are you in a relationship with someone who physically or mentally threatens you? N N N N   Is it safe for you to go home? Y Y Y Y       ADL Screening:  :     ADL Assessment 4/3/2015   Feeding yourself No Help Needed   Getting from bed to chair No Help Needed   Getting dressed No Help Needed   Bathing or showering No Help Needed   Walk across the room (includes cane/walker) No Help Needed   Using the telphone No Help Needed   Taking your medications No Help Needed   Preparing meals No Help Needed   Managing money (expenses/bills) No Help Needed   Moderately strenuous housework (laundry) No Help Needed   Shopping for personal items (toiletries/medicines) No Help Needed   Shopping for groceries No Help Needed   Driving No Help Needed   Climbing a flight of stairs No Help Needed   Getting to places beyond walking distances No Help Needed                 Medication reconciliation up to date and corrected with patient at this time.

## 2018-07-03 ENCOUNTER — TELEPHONE (OUTPATIENT)
Dept: INTERNAL MEDICINE CLINIC | Facility: CLINIC | Age: 83
End: 2018-07-03

## 2018-07-03 NOTE — TELEPHONE ENCOUNTER
Please notify patient that Dr. Celestina Alexis communicated with Dr. Summer Braxton, the heart doctor. He said she can still have the cardiac catheterization study on Thursday, 7/5/18.

## 2018-07-04 LAB
BACTERIA UR CULT: ABNORMAL
BACTERIA UR CULT: ABNORMAL

## 2018-07-05 ENCOUNTER — HOSPITAL ENCOUNTER (OUTPATIENT)
Dept: CARDIAC CATH/INVASIVE PROCEDURES | Age: 83
Discharge: HOME | End: 2018-07-06
Attending: INTERNAL MEDICINE | Admitting: INTERNAL MEDICINE
Payer: MEDICARE

## 2018-07-05 PROBLEM — I25.111 CORONARY ARTERY DISEASE INVOLVING NATIVE CORONARY ARTERY OF NATIVE HEART WITH ANGINA PECTORIS WITH DOCUMENTED SPASM (HCC): Status: ACTIVE | Noted: 2018-07-05

## 2018-07-05 PROBLEM — Z98.890 S/P CARDIAC CATH: Status: ACTIVE | Noted: 2018-07-05

## 2018-07-05 PROBLEM — N39.0 UTI (URINARY TRACT INFECTION): Status: ACTIVE | Noted: 2018-07-05

## 2018-07-05 LAB
ATRIAL RATE: 59 BPM
CALCULATED P AXIS, ECG09: 41 DEGREES
CALCULATED R AXIS, ECG10: -26 DEGREES
CALCULATED T AXIS, ECG11: 112 DEGREES
DIAGNOSIS, 93000: NORMAL
P-R INTERVAL, ECG05: 260 MS
Q-T INTERVAL, ECG07: 514 MS
QRS DURATION, ECG06: 136 MS
QTC CALCULATION (BEZET), ECG08: 508 MS
VENTRICULAR RATE, ECG03: 59 BPM

## 2018-07-05 PROCEDURE — C1894 INTRO/SHEATH, NON-LASER: HCPCS

## 2018-07-05 PROCEDURE — C1887 CATHETER, GUIDING: HCPCS

## 2018-07-05 PROCEDURE — 74011250637 HC RX REV CODE- 250/637: Performed by: INTERNAL MEDICINE

## 2018-07-05 PROCEDURE — 77030019569 HC BND COMPR RAD TERU -B

## 2018-07-05 PROCEDURE — 74011250637 HC RX REV CODE- 250/637: Performed by: NURSE PRACTITIONER

## 2018-07-05 PROCEDURE — C1769 GUIDE WIRE: HCPCS

## 2018-07-05 PROCEDURE — 77030022017 HC DRSG HEMO QCLOT ZMED -A

## 2018-07-05 PROCEDURE — C1725 CATH, TRANSLUMIN NON-LASER: HCPCS

## 2018-07-05 PROCEDURE — 74011000258 HC RX REV CODE- 258: Performed by: INTERNAL MEDICINE

## 2018-07-05 PROCEDURE — 77030019698 HC SYR ANGI MDLON MRTM -A

## 2018-07-05 PROCEDURE — 77030004549 HC CATH ANGI DX PRF MRTM -A

## 2018-07-05 PROCEDURE — C1760 CLOSURE DEV, VASC: HCPCS

## 2018-07-05 PROCEDURE — 93005 ELECTROCARDIOGRAM TRACING: CPT

## 2018-07-05 PROCEDURE — 99152 MOD SED SAME PHYS/QHP 5/>YRS: CPT

## 2018-07-05 PROCEDURE — 74011250636 HC RX REV CODE- 250/636

## 2018-07-05 PROCEDURE — 74011000250 HC RX REV CODE- 250

## 2018-07-05 PROCEDURE — 92928 PRQ TCAT PLMT NTRAC ST 1 LES: CPT

## 2018-07-05 PROCEDURE — 93458 L HRT ARTERY/VENTRICLE ANGIO: CPT

## 2018-07-05 PROCEDURE — 74011250636 HC RX REV CODE- 250/636: Performed by: INTERNAL MEDICINE

## 2018-07-05 PROCEDURE — C1874 STENT, COATED/COV W/DEL SYS: HCPCS

## 2018-07-05 PROCEDURE — 75710 ARTERY X-RAYS ARM/LEG: CPT

## 2018-07-05 PROCEDURE — 77030003631 HC NDL PERC VASC TELE -B

## 2018-07-05 PROCEDURE — 77030028837 HC SYR ANGI PWR INJ COEU -A

## 2018-07-05 PROCEDURE — 77030015766

## 2018-07-05 PROCEDURE — 76937 US GUIDE VASCULAR ACCESS: CPT

## 2018-07-05 PROCEDURE — 77030019697 HC SYR ANGI INFL MRTM -B

## 2018-07-05 PROCEDURE — 74011636320 HC RX REV CODE- 636/320

## 2018-07-05 PROCEDURE — 77030012468 HC VLV BLEEDBK CNTRL ABBT -B

## 2018-07-05 PROCEDURE — 77030008543 HC TBNG MON PRSS MRTM -A

## 2018-07-05 PROCEDURE — 99153 MOD SED SAME PHYS/QHP EA: CPT

## 2018-07-05 PROCEDURE — 77030010221 HC SPLNT WR POS TELE -B

## 2018-07-05 RX ORDER — BIVALIRUDIN 250 MG/5ML
INJECTION, POWDER, LYOPHILIZED, FOR SOLUTION INTRAVENOUS
Status: DISCONTINUED
Start: 2018-07-05 | End: 2018-07-05

## 2018-07-05 RX ORDER — METOPROLOL SUCCINATE 25 MG/1
12.5 TABLET, EXTENDED RELEASE ORAL DAILY
Status: DISCONTINUED | OUTPATIENT
Start: 2018-07-06 | End: 2018-07-06 | Stop reason: HOSPADM

## 2018-07-05 RX ORDER — SODIUM CHLORIDE 9 MG/ML
75 INJECTION, SOLUTION INTRAVENOUS CONTINUOUS
Status: DISCONTINUED | OUTPATIENT
Start: 2018-07-05 | End: 2018-07-06

## 2018-07-05 RX ORDER — AMLODIPINE BESYLATE 5 MG/1
5 TABLET ORAL DAILY
Status: DISCONTINUED | OUTPATIENT
Start: 2018-07-06 | End: 2018-07-06 | Stop reason: HOSPADM

## 2018-07-05 RX ORDER — CLONIDINE HYDROCHLORIDE 0.1 MG/1
0.1 TABLET ORAL 2 TIMES DAILY
Status: DISCONTINUED | OUTPATIENT
Start: 2018-07-05 | End: 2018-07-06 | Stop reason: HOSPADM

## 2018-07-05 RX ORDER — LANOLIN ALCOHOL/MO/W.PET/CERES
400 CREAM (GRAM) TOPICAL DAILY
Status: DISCONTINUED | OUTPATIENT
Start: 2018-07-06 | End: 2018-07-06 | Stop reason: HOSPADM

## 2018-07-05 RX ORDER — GUAIFENESIN 100 MG/5ML
81 LIQUID (ML) ORAL DAILY
Status: DISCONTINUED | OUTPATIENT
Start: 2018-07-05 | End: 2018-07-06 | Stop reason: HOSPADM

## 2018-07-05 RX ORDER — VERAPAMIL HYDROCHLORIDE 2.5 MG/ML
2.5 INJECTION, SOLUTION INTRAVENOUS ONCE
Status: COMPLETED | OUTPATIENT
Start: 2018-07-05 | End: 2018-07-05

## 2018-07-05 RX ORDER — MIDAZOLAM HYDROCHLORIDE 1 MG/ML
.5-2 INJECTION, SOLUTION INTRAMUSCULAR; INTRAVENOUS
Status: DISCONTINUED | OUTPATIENT
Start: 2018-07-05 | End: 2018-07-05

## 2018-07-05 RX ORDER — SODIUM CHLORIDE 0.9 % (FLUSH) 0.9 %
SYRINGE (ML) INJECTION
Status: DISCONTINUED
Start: 2018-07-05 | End: 2018-07-05

## 2018-07-05 RX ORDER — ATORVASTATIN CALCIUM 20 MG/1
20 TABLET, FILM COATED ORAL
Status: DISCONTINUED | OUTPATIENT
Start: 2018-07-05 | End: 2018-07-06 | Stop reason: HOSPADM

## 2018-07-05 RX ORDER — VERAPAMIL HYDROCHLORIDE 2.5 MG/ML
INJECTION, SOLUTION INTRAVENOUS
Status: COMPLETED
Start: 2018-07-05 | End: 2018-07-05

## 2018-07-05 RX ORDER — VALSARTAN 160 MG/1
160 TABLET ORAL DAILY
Status: DISCONTINUED | OUTPATIENT
Start: 2018-07-06 | End: 2018-07-06 | Stop reason: HOSPADM

## 2018-07-05 RX ORDER — FENTANYL CITRATE 50 UG/ML
INJECTION, SOLUTION INTRAMUSCULAR; INTRAVENOUS
Status: COMPLETED
Start: 2018-07-05 | End: 2018-07-05

## 2018-07-05 RX ORDER — LIDOCAINE HYDROCHLORIDE 10 MG/ML
INJECTION INFILTRATION; PERINEURAL
Status: DISCONTINUED
Start: 2018-07-05 | End: 2018-07-05

## 2018-07-05 RX ORDER — CLOPIDOGREL 300 MG/1
600 TABLET, FILM COATED ORAL ONCE
Status: COMPLETED | OUTPATIENT
Start: 2018-07-05 | End: 2018-07-05

## 2018-07-05 RX ORDER — SODIUM CHLORIDE 0.9 % (FLUSH) 0.9 %
5-10 SYRINGE (ML) INJECTION EVERY 8 HOURS
Status: DISCONTINUED | OUTPATIENT
Start: 2018-07-05 | End: 2018-07-06 | Stop reason: HOSPADM

## 2018-07-05 RX ORDER — SODIUM CHLORIDE 9 MG/ML
INJECTION, SOLUTION INTRAVENOUS
Status: DISCONTINUED
Start: 2018-07-05 | End: 2018-07-05

## 2018-07-05 RX ORDER — HEPARIN SODIUM 200 [USP'U]/100ML
500 INJECTION, SOLUTION INTRAVENOUS ONCE
Status: COMPLETED | OUTPATIENT
Start: 2018-07-05 | End: 2018-07-05

## 2018-07-05 RX ORDER — FAMOTIDINE 20 MG/1
20 TABLET, FILM COATED ORAL DAILY
Status: DISCONTINUED | OUTPATIENT
Start: 2018-07-06 | End: 2018-07-06 | Stop reason: HOSPADM

## 2018-07-05 RX ORDER — HYDROCORTISONE SODIUM SUCCINATE 100 MG/2ML
100 INJECTION, POWDER, FOR SOLUTION INTRAMUSCULAR; INTRAVENOUS ONCE
Status: COMPLETED | OUTPATIENT
Start: 2018-07-05 | End: 2018-07-05

## 2018-07-05 RX ORDER — MIDAZOLAM HYDROCHLORIDE 1 MG/ML
INJECTION, SOLUTION INTRAMUSCULAR; INTRAVENOUS
Status: COMPLETED
Start: 2018-07-05 | End: 2018-07-05

## 2018-07-05 RX ORDER — HEPARIN SODIUM 1000 [USP'U]/ML
2500 INJECTION, SOLUTION INTRAVENOUS; SUBCUTANEOUS ONCE
Status: COMPLETED | OUTPATIENT
Start: 2018-07-05 | End: 2018-07-05

## 2018-07-05 RX ORDER — HYDROCORTISONE SODIUM SUCCINATE 100 MG/2ML
100 INJECTION, POWDER, FOR SOLUTION INTRAMUSCULAR; INTRAVENOUS ONCE
Status: DISCONTINUED | OUTPATIENT
Start: 2018-07-05 | End: 2018-07-05

## 2018-07-05 RX ORDER — CLOPIDOGREL BISULFATE 75 MG/1
75 TABLET ORAL DAILY
Status: DISCONTINUED | OUTPATIENT
Start: 2018-07-06 | End: 2018-07-06 | Stop reason: HOSPADM

## 2018-07-05 RX ORDER — DIPHENHYDRAMINE HYDROCHLORIDE 50 MG/ML
50 INJECTION, SOLUTION INTRAMUSCULAR; INTRAVENOUS ONCE
Status: DISCONTINUED | OUTPATIENT
Start: 2018-07-05 | End: 2018-07-05

## 2018-07-05 RX ORDER — HEPARIN SODIUM 1000 [USP'U]/ML
INJECTION, SOLUTION INTRAVENOUS; SUBCUTANEOUS
Status: COMPLETED
Start: 2018-07-05 | End: 2018-07-05

## 2018-07-05 RX ORDER — DIPHENHYDRAMINE HYDROCHLORIDE 50 MG/ML
50 INJECTION, SOLUTION INTRAMUSCULAR; INTRAVENOUS ONCE
Status: COMPLETED | OUTPATIENT
Start: 2018-07-05 | End: 2018-07-05

## 2018-07-05 RX ORDER — FUROSEMIDE 40 MG/1
40 TABLET ORAL DAILY
Status: DISCONTINUED | OUTPATIENT
Start: 2018-07-06 | End: 2018-07-06 | Stop reason: HOSPADM

## 2018-07-05 RX ORDER — FENTANYL CITRATE 50 UG/ML
25-50 INJECTION, SOLUTION INTRAMUSCULAR; INTRAVENOUS
Status: DISCONTINUED | OUTPATIENT
Start: 2018-07-05 | End: 2018-07-05

## 2018-07-05 RX ORDER — LIDOCAINE HYDROCHLORIDE 10 MG/ML
INJECTION, SOLUTION EPIDURAL; INFILTRATION; INTRACAUDAL; PERINEURAL
Status: COMPLETED
Start: 2018-07-05 | End: 2018-07-05

## 2018-07-05 RX ORDER — CLOPIDOGREL 300 MG/1
TABLET, FILM COATED ORAL
Status: DISCONTINUED
Start: 2018-07-05 | End: 2018-07-06 | Stop reason: HOSPADM

## 2018-07-05 RX ORDER — LIDOCAINE HYDROCHLORIDE 10 MG/ML
1-30 INJECTION, SOLUTION EPIDURAL; INFILTRATION; INTRACAUDAL; PERINEURAL
Status: DISCONTINUED | OUTPATIENT
Start: 2018-07-05 | End: 2018-07-05

## 2018-07-05 RX ORDER — ACETAMINOPHEN 325 MG/1
650 TABLET ORAL
Status: DISCONTINUED | OUTPATIENT
Start: 2018-07-05 | End: 2018-07-06 | Stop reason: HOSPADM

## 2018-07-05 RX ORDER — HEPARIN SODIUM 200 [USP'U]/100ML
INJECTION, SOLUTION INTRAVENOUS
Status: COMPLETED
Start: 2018-07-05 | End: 2018-07-05

## 2018-07-05 RX ORDER — SODIUM CHLORIDE 0.9 % (FLUSH) 0.9 %
5-10 SYRINGE (ML) INJECTION AS NEEDED
Status: DISCONTINUED | OUTPATIENT
Start: 2018-07-05 | End: 2018-07-06 | Stop reason: HOSPADM

## 2018-07-05 RX ORDER — CIPROFLOXACIN 250 MG/1
250 TABLET, FILM COATED ORAL 2 TIMES DAILY
Status: DISCONTINUED | OUTPATIENT
Start: 2018-07-05 | End: 2018-07-06 | Stop reason: HOSPADM

## 2018-07-05 RX ADMIN — FENTANYL CITRATE 25 MCG: 50 INJECTION, SOLUTION INTRAMUSCULAR; INTRAVENOUS at 10:20

## 2018-07-05 RX ADMIN — ATORVASTATIN CALCIUM 20 MG: 20 TABLET, FILM COATED ORAL at 21:19

## 2018-07-05 RX ADMIN — IOPAMIDOL 130 ML: 755 INJECTION, SOLUTION INTRAVENOUS at 10:28

## 2018-07-05 RX ADMIN — VERAPAMIL HYDROCHLORIDE 2.5 MG: 2.5 INJECTION, SOLUTION INTRAVENOUS at 09:11

## 2018-07-05 RX ADMIN — CLONIDINE HYDROCHLORIDE 0.1 MG: 0.1 TABLET ORAL at 18:22

## 2018-07-05 RX ADMIN — VERAPAMIL HYDROCHLORIDE 2.5 MG: 2.5 INJECTION INTRAVENOUS at 09:11

## 2018-07-05 RX ADMIN — FENTANYL CITRATE 25 MCG: 50 INJECTION, SOLUTION INTRAMUSCULAR; INTRAVENOUS at 08:25

## 2018-07-05 RX ADMIN — HEPARIN SODIUM 1000 UNITS: 200 INJECTION, SOLUTION INTRAVENOUS at 08:37

## 2018-07-05 RX ADMIN — HEPARIN SODIUM 2500 UNITS: 1000 INJECTION, SOLUTION INTRAVENOUS; SUBCUTANEOUS at 09:12

## 2018-07-05 RX ADMIN — IOPAMIDOL 18 ML: 755 INJECTION, SOLUTION INTRAVENOUS at 09:31

## 2018-07-05 RX ADMIN — MIDAZOLAM HYDROCHLORIDE 1 MG: 1 INJECTION, SOLUTION INTRAMUSCULAR; INTRAVENOUS at 10:42

## 2018-07-05 RX ADMIN — CIPROFLOXACIN HYDROCHLORIDE 250 MG: 250 TABLET, FILM COATED ORAL at 14:29

## 2018-07-05 RX ADMIN — SODIUM CHLORIDE 75 ML/HR: 900 INJECTION, SOLUTION INTRAVENOUS at 14:36

## 2018-07-05 RX ADMIN — IOPAMIDOL 80 ML: 755 INJECTION, SOLUTION INTRAVENOUS at 11:00

## 2018-07-05 RX ADMIN — NITROGLYCERIN 200 MCG: 5 INJECTION, SOLUTION INTRAVENOUS at 09:12

## 2018-07-05 RX ADMIN — HYDROCORTISONE SODIUM SUCCINATE 100 MG: 100 INJECTION, POWDER, FOR SOLUTION INTRAMUSCULAR; INTRAVENOUS at 07:50

## 2018-07-05 RX ADMIN — ASPIRIN 81 MG 81 MG: 81 TABLET ORAL at 07:29

## 2018-07-05 RX ADMIN — SODIUM CHLORIDE 75 ML/HR: 900 INJECTION, SOLUTION INTRAVENOUS at 07:55

## 2018-07-05 RX ADMIN — CLONIDINE HYDROCHLORIDE 0.1 MG: 0.1 TABLET ORAL at 14:03

## 2018-07-05 RX ADMIN — LIDOCAINE HYDROCHLORIDE 1 ML: 10 INJECTION, SOLUTION EPIDURAL; INFILTRATION; INTRACAUDAL; PERINEURAL at 08:43

## 2018-07-05 RX ADMIN — MIDAZOLAM HYDROCHLORIDE 2 MG: 1 INJECTION, SOLUTION INTRAMUSCULAR; INTRAVENOUS at 08:25

## 2018-07-05 RX ADMIN — CLOPIDOGREL BISULFATE 600 MG: 300 TABLET, FILM COATED ORAL at 11:15

## 2018-07-05 RX ADMIN — BIVALIRUDIN 1.75 MG/KG/HR: 250 INJECTION, POWDER, LYOPHILIZED, FOR SOLUTION INTRAVENOUS at 10:31

## 2018-07-05 RX ADMIN — MIDAZOLAM HYDROCHLORIDE 1 MG: 1 INJECTION, SOLUTION INTRAMUSCULAR; INTRAVENOUS at 10:12

## 2018-07-05 RX ADMIN — DIPHENHYDRAMINE HYDROCHLORIDE 50 MG: 50 INJECTION, SOLUTION INTRAMUSCULAR; INTRAVENOUS at 07:50

## 2018-07-05 RX ADMIN — BIVALIRUDIN 1.75 MG/KG/HR: 250 INJECTION, POWDER, LYOPHILIZED, FOR SOLUTION INTRAVENOUS at 09:36

## 2018-07-05 RX ADMIN — MIDAZOLAM 2 MG: 1 INJECTION INTRAMUSCULAR; INTRAVENOUS at 08:25

## 2018-07-05 RX ADMIN — IOPAMIDOL 25 ML: 755 INJECTION, SOLUTION INTRAVENOUS at 11:11

## 2018-07-05 NOTE — IP AVS SNAPSHOT
[address] [facility] 
[phone] Patient: Shell Caldera MRN: [id number] TUT:[id number] About your hospitalization You were admitted on:  July 5, 2018 You last received care in the:  [facility] 2 INTRVNTNL CARDIO You were discharged on:  July 6, 2018 Why you were hospitalized Your primary diagnosis was:  Not on File Your diagnoses also included:  Uti (Urinary Tract Infection), Coronary Artery Disease Involving Native Coronary Artery Of Native Heart With Angina Pectoris With Documented Spasm (Hcc), S/P Cardiac Cath Follow-up Information Follow up With Details Comments Contact Info Abbie Cunningham MD On 7/25/2018 9:15am [address] [place] [address] Road [facility] [phone] Tatiana Hester NP On 7/12/2018 at 10:30 at [address] [address] [facility] E [facility] 
[phone] Your Scheduled Appointments Wednesday July 25, 2018  9:15 AM EDT ACUTE CARE with Abbie Cunningham MD  
[facility]\"" [phone] Discharge Orders None A check  indicates which time of day the medication should be taken. My Medications START taking these medications Instructions Each Dose to Equal  
 Morning Noon Evening Bedtime  
 clopidogrel 75 mg Tab Commonly known as:  PLAVIX Your last dose was: Your next dose is: Take 1 Tab by mouth daily. 75 mg CHANGE how you take these medications Instructions Each Dose to Equal  
 Morning Noon Evening Bedtime  
 amLODIPine 5 mg tablet Commonly known as:  Roni Vaughan What changed:  when to take this Your last dose was: Your next dose is: Take 1 Tab by mouth daily. 5 mg CONTINUE taking these medications Instructions Each Dose to Equal  
 Morning Noon Evening Bedtime  
 aspirin 81 mg chewable tablet Your last dose was: Your next dose is: Take 1 Tab by mouth daily. 81 mg  
    
   
   
   
  
 atorvastatin 20 mg tablet Commonly known as:  LIPITOR Your last dose was: Your next dose is: TAKE ONE TABLET BY MOUTH ONCE DAILY  
     
   
   
   
  
 cholecalciferol 1,000 unit Cap Commonly known as:  VITAMIN D3 Your last dose was: Your next dose is: Take  by mouth daily. ciprofloxacin HCl 250 mg tablet Commonly known as:  CIPRO Your last dose was: Your next dose is: Take 1 Tab by mouth two (2) times a day for 7 days. Indications: BACTERIAL URINARY TRACT INFECTION  
 250 mg  
    
   
   
   
  
 cloNIDine HCl 0.1 mg tablet Commonly known as:  CATAPRES Your last dose was: Your next dose is: Take 1 Tab by mouth two (2) times a day. Indications: hypertension 0.1 mg  
    
   
   
   
  
 furosemide 40 mg tablet Commonly known as:  LASIX Your last dose was: Your next dose is: TAKE ONE TABLET BY MOUTH ONCE DAILY losartan 100 mg tablet Commonly known as:  COZAAR Your last dose was: Your next dose is: TAKE ONE TABLET BY MOUTH ONCE DAILY  
     
   
   
   
  
 magnesium oxide 400 mg tablet Commonly known as:  MAG-OX Your last dose was: Your next dose is: Take 1 Tab by mouth daily. 400 mg  
    
   
   
   
  
 metoprolol succinate 25 mg XL tablet Commonly known as:  TOPROL-XL Your last dose was: Your next dose is: Take 0.5 Tabs by mouth daily. 12.5 mg MIRALAX 17 gram/dose powder Generic drug:  polyethylene glycol Your last dose was: Your next dose is: Take 17 g by mouth daily. 17 g  
    
   
   
   
  
 raNITIdine 150 mg tablet Commonly known as:  ZANTAC Your last dose was: Your next dose is:    
   
   
      
   
   
   
  
 TYLENOL ARTHRITIS PAIN 650 mg Tber Generic drug:  acetaminophen Your last dose was: Your next dose is: Take 650 mg by mouth every eight (8) hours. 650 mg Where to Get Your Medications These medications were sent to [address] [place] [address] 12, [address] [place] [address] Phone:  [phone]  
  clopidogrel 75 mg Tab Discharge Instructions [address] [address]  [phone] Patient ID: 
Pura Renee 
[id number] 
87 y.o. 
1934 Admit Date: 7/5/2018 Discharge Date: 7/6/2018 Admitting Physician: Car Medeiros MD  
 
Discharge Physician: Nadira Barbosa NP Admission Diagnoses:  
cath Discharge Diagnoses: Active Problems: 
  UTI (urinary tract infection) (7/5/2018) Coronary artery disease involving native coronary artery of native heart with angina pectoris with documented spasm ([address] (7/5/2018) Overview: 7/2018 PCI of LAD and RCA 
 
  S/P cardiac cath (7/5/2018) Overview: 7/5/18: stents to LAD and RCA Discharge Condition: Good Cardiology Procedures this Admission:  Left heart catheterization with PCI Disposition: home Reference discharge instructions provided by nursing for diet and activity. Signed: 
Nadira Barbosa NP 
7/6/2018 
8:23 AM 
 
 
Radial Cardiac Catheterization/Angiography Discharge Instructions It is normal to feel tired the first couple days. Take it easy and follow the physicians instructions. CHECK THE CATHETER INSERTION SITE DAILY: 
 
Remove the wrist dressing 24 hours after the procedure. You may shower 24 hours after the procedure. Wash with soap and water and pat dry. Gentle cleaning of the site with soap and water is sufficient, cover with a dry clean dressing or bandage. Do not apply creams or powders to the area. No soaking the wrist for 3 days. Leave the puncture site open to air after 24 hours post-procedure. CALL THE PHYSICIANS:  
 
If the site becomes red, swollen or feels warm to the touch If there is bleeding or drainage or if there is unusual pain at the radial site. If there is any minor oozing, you may apply a band-aid and remove after 12 hours. If the bleeding continues, hold pressure with the middle finger against the puncture site and the thumb against the back of the wrist,call 911 to be transported to the hospital. 
DO NOT DRIVE YOURSELF, [address]. ACTIVITY:  
For the first 24 hours do not manipulate the wrist. 
No lifting, pushing or pulling over 3-5 pounds with the affected wrist for 7 daysand no straining the insertion site. Do not life grocery bags or the garbage can, do not run the vacuum  or  for 7 days. Start with short walks as in the hospital and gradually increase as tolerated each day. It is recommended to walk 30 minutes 5-7 days per week. Follow your physicians instructions on activity. Avoid walking outside in extremes of heat or cold. Walk inside when it is cold and windy or hot and humid. Things to keep in mind: 
No driving for at least 24 hours, or as designated by your physician. Limit the number of times you go up and down the stairs Take rests and pace yourself with activity. Be careful and do not strain with bowel movements. MEDICATIONS: 
 
Take all medications as prescribed Call your physician if you have any questions Keep an updated list of your medications with you at all times and give a list to your physician and pharmacist 
 
SIGNS AND SYMPTOMS:  
 Be cautious of symptoms of angina or recurrent symptoms such as chest discomfort, unusual shortness of breath or fatigue. These could be symptoms of restenosis, a new blockage or a heart attack. If your symptoms are relieved with rest it is still recommended that you notify your physician of recurrent chest pain or discomfort. For CHEST PAIN or symptoms of angina not relieved with rest:  If the discomfort is not relieved with rest, and you have been prescribed Nitroglycerin, take as directed (taken under the tongue, one at a time 5 minutes apart for a total of 3 doses). If the discomfort is not relieved after the 3rd nitroglycerin, call 911. If you have not been prescribed Nitroglycerin  and your chest discomfort is not relieved with rest, call 911. AFTER CARE:  
Follow up with your physician as instructed. Follow a heart healthy diet with proper portion control, daily stress management, daily exercise, blood pressure and cholesterol control , and smoking cessation. Introducing [facility]! [address] [facility] introduces [employer] patient portal. Now you can access parts of your medical record, email your doctor's office, and request medication refills online. 1. In your internet browser, go to [email or web address] 2. Click on the First Time User? Click Here link in the Sign In box. You will see the New Member Sign Up page. 3. Enter your [employer] Access Code exactly as it appears below. You will not need to use this code after youve completed the sign-up process. If you do not sign up before the expiration date, you must request a new code. · [employer] Access Code: [email or web address] Expires: 7/15/2018  4:24 PM 
 
4. Enter the last four digits of your Social Security Number (xxxx) and Date of Birth (mm/dd/yyyy) as indicated and click Submit. You will be taken to the next sign-up page. 5. Create a [employer] ID.  This will be your [employer] login ID and cannot be changed, so think of one that is secure and easy to remember. 6. Create a [employer] password. You can change your password at any time. 7. Enter your Password Reset Question and Answer. This can be used at a later time if you forget your password. 8. Enter your e-mail address. You will receive e-mail notification when new information is available in [address] 9. Click Sign Up. You can now view and download portions of your medical record. 10. Click the Download Summary menu link to download a portable copy of your medical information. If you have questions, please visit the Frequently Asked Questions section of the [employer] website. Remember, [employer] is NOT to be used for urgent needs. For medical emergencies, dial 911. Now available from your iPhone and Android! Introducing Jared Remy As a [email or web address] patient, I wanted to make you aware of our electronic visit tool called Jared Remy. [employer] allows you to connect within minutes with a medical provider 24 hours a day, seven days a week via a mobile device or tablet or logging into a secure website from your computer. You can access Jared Remy from anywhere in the [place]. A virtual visit might be right for you when you have a simple condition and feel like you just dont want to get out of bed, or cant get away from work for an appointment, when your regular Elina Marquezer provider is not available (evenings, weekends or holidays), or when youre out of town and need minor care. Electronic visits cost only $49 and if the [employer] provider determines a prescription is needed to treat your condition, one can be electronically transmitted to a nearby pharmacy*. Please take a moment to enroll today if you have not already done so. The enrollment process is free and takes just a few minutes.   To enroll, please download the [employer] duyen to your tablet or phone, or visit [email or web address] to enroll on your computer. And, as an [address] [place] [address] patient with a IMN account, the results of your visits will be scanned into your electronic medical record and your primary care provider will be able to view the scanned results. We urge you to continue to see your regular [facility] provider for your ongoing medical care. And while your primary care provider may not be the one available when you seek a [employer] virtual visit, the peace of mind you get from getting a real diagnosis real time can be priceless. For more information on [employer], view our Frequently Asked Questions (FAQs) at [email or web address]. Sincerely, 
 
Glynn Brito MD 
Chief Medical Officer [facility] Bonnie Greer *:  certain medications cannot be prescribed via [employer] Providers Seen During Your Hospitalization Provider Specialty Primary office phone Bobbi Rhoades MD Cardiology [phone] Your Primary Care Physician (PCP) Primary Care Physician Office Phone Office Fax [address] [facility], [address] [place] [address] [phone] You are allergic to the following Allergen Reactions Contrast Agent (Iodine) Hives Penicillins Hives Bystolic (Nebivolol) Other (comments)  
 abd pain  
    
 Cardizem (Diltiazem Hcl) Rash Cardura (Doxazosin) Unknown (comments) Codeine Nausea Only Cymbalta (Duloxetine) Other (comments) Abdominal pain, anxiety Gabapentin Other (comments) Made her feel crazy Prinivil (Lisinopril) Unknown (comments) Tekturna (Aliskiren) Other (comments)  
 abd cramps Tenormin (Atenolol) Unknown (comments) Recent Documentation Height Weight BMI OB Status Smoking Status 1.626 m 73 kg 27.64 kg/m2 Postmenopausal Never Smoker Emergency Contacts Name Discharge Info Relation Home Work Mobile Dl Luis DISCHARGE CAREGIVER [3] Child [2] [phone] DawsonForeign DISCHARGE CAREGIVER [3] Child [2] [phone] Sherryasim Messina  Child [2] [phone] Patient Belongings The following personal items are in your possession at time of discharge: 
  Dental Appliances: None         Home Medications: None   Jewelry: None  Clothing: At bedside    Other Valuables: None Please provide this summary of care documentation to your next provider. Signatures-by signing, you are acknowledging that this After Visit Summary has been reviewed with you and you have received a copy. Patient Signature:  ____________________________________________________________ Date:  ____________________________________________________________  
  
[facility] Provider Signature:  ____________________________________________________________ Date:  ____________________________________________________________

## 2018-07-05 NOTE — PROGRESS NOTES
TRANSFER - OUT REPORT:    Verbal report given to MARCUS Aguayo(name) on Vani Gutiérrez  being transferred to [facility]CU(unit) for routine post - op       Report consisted of patients Situation, Background, Assessment and   Recommendations(SBAR). Information from the following report(s) SBAR, Procedure Summary, MAR and Recent Results was reviewed with the receiving nurse. Lines:   Peripheral IV 07/05/18 Right Antecubital (Active)   Site Assessment Clean, dry, & intact 7/5/2018  7:59 AM   Phlebitis Assessment 0 7/5/2018  7:59 AM   Infiltration Assessment 0 7/5/2018  7:59 AM   Dressing Status Clean, dry, & intact 7/5/2018  7:59 AM   Dressing Type Tape;Transparent 7/5/2018  7:59 AM   Hub Color/Line Status Pink;Patent; Infusing 7/5/2018  7:59 AM   Alcohol Cap Used Yes 7/5/2018  7:59 AM        Opportunity for questions and clarification was provided.       Patient transported with:   Registered Nurse  Tech

## 2018-07-05 NOTE — PROGRESS NOTES
36 - RN assisted pt. To bathroom and sitting in chair. Pt. Ambulate to bathroom and to chair without difficulty. No SOB report. Stable vital signs.

## 2018-07-05 NOTE — H&P (VIEW-ONLY)
[address] [place] [address] [facility]  [phone]     Subjective: Gato Evans is a 80 y.o. female with pmhx HTN, GERD, spinal stenosis is here to establish care and further evaluation of loyola, fatigue, dizziness, chest tightness. .  Noted ED presentation 6/14/18  where she c/o of intermittent episodes of dizziness, fatigue, chest tightness and LOYOLA x 1 wk. States she was seen at [facility] on 6/12/18 for the same. In the ED,  EKG showed LBBB with no other acute changes, troponin neg x 2, ntproBNP 74, ddimer neg, CXray clear. Echo prelim with EF 45-50%, no WMA. Patient states she cervical spondylosis which causes pain and increased anxiety. She also reports chronic edema of BLE, but denies any worsening Denies  orthopnea, PND, palpitations, syncope, or presyncope.        Cardiac risk factors: dyslipidemia, obesity, hypertension, stress, post-menopausal, family hx of CAD (mother had a heart attack, brother had heart attack and other brother valve replacement), former smoker    Patient Active Problem List    Diagnosis Date Noted    SOB (shortness of breath) 06/14/2018    Hypercholesterolemia 01/19/2017    Osteopenia with high risk of fracture 03/09/2016    Advance directive on file 11/28/2015    Tubular adenoma of colon 08/04/2014    Rotator cuff rupture 03/12/2013    GERD (gastroesophageal reflux disease) 03/14/2011    Hypertension, essential 03/01/2010    Plantar fasciitis 03/01/2010    Spinal stenosis 03/01/2010    Cervical neck pain with evidence of disc disease 03/01/2010      Chris Malin MD  Past Medical History:   Diagnosis Date    GERD (gastroesophageal reflux disease)     Hypercholesterolemia     Hypertension     SOB (shortness of breath) 6/14/2018      Past Surgical History:   Procedure Laterality Date    ABDOMEN SURGERY PROC UNLISTED  2005    Laproscopic colon polyp excision Dr. Norma Pineda      Age 25    HX HEENT Bilateral cataracts    HX ORTHOPAEDIC  1996    Lumbar laminectomy    ME COLSC FLX W/RMVL OF TUMOR POLYP LESION SNARE TQ  7/18/2014          Allergies   Allergen Reactions    Contrast Agent [Iodine] Hives    Penicillins Hives    Bystolic [Nebivolol] Other (comments)     abd pain    Cardizem [Diltiazem Hcl] Rash    Cardura [Doxazosin] Unknown (comments)    Codeine Nausea Only    Cymbalta [Duloxetine] Other (comments)     Abdominal pain, anxiety    Gabapentin Other (comments)     Made her feel crazy    Prinivil [Lisinopril] Unknown (comments)    Tekturna [Aliskiren] Other (comments)     abd cramps    Tenormin [Atenolol] Unknown (comments)      Family History   Problem Relation Age of Onset    Heart Disease Mother     Stroke Mother     Cancer Father     Cancer Brother     Heart Disease Brother     Cancer Brother       Social History     Social History    Marital status:      Spouse name: N/A    Number of children: N/A    Years of education: N/A     Occupational History    Not on file. Social History Main Topics    Smoking status: Never Smoker    Smokeless tobacco: Never Used    Alcohol use No    Drug use: Not on file    Sexual activity: Not on file     Other Topics Concern    Not on file     Social History Narrative      Current Outpatient Prescriptions   Medication Sig    raNITIdine (ZANTAC) 150 mg tablet     aspirin 81 mg chewable tablet Take 1 Tab by mouth daily.  metoprolol succinate (TOPROL-XL) 25 mg XL tablet Take 0.5 Tabs by mouth daily.  acetaminophen (TYLENOL ARTHRITIS PAIN) 650 mg TbER Take 650 mg by mouth every eight (8) hours.  cloNIDine HCl (CATAPRES) 0.1 mg tablet Take 1 Tab by mouth two (2) times a day. Indications: hypertension    amLODIPine (NORVASC) 5 mg tablet Take 1 Tab by mouth daily.  (Patient taking differently: Take 5 mg by mouth two (2) times a day.)    furosemide (LASIX) 40 mg tablet TAKE ONE TABLET BY MOUTH ONCE DAILY    atorvastatin (LIPITOR) 20 mg tablet TAKE ONE TABLET BY MOUTH ONCE DAILY    losartan (COZAAR) 100 mg tablet TAKE ONE TABLET BY MOUTH ONCE DAILY    cholecalciferol (VITAMIN D3) 1,000 unit cap Take  by mouth daily.  magnesium oxide (MAG-OX) 400 mg tablet Take 1 Tab by mouth daily.  polyethylene glycol (MIRALAX) 17 gram/dose powder Take 17 g by mouth daily. No current facility-administered medications for this visit. Review of Symptoms:  11 systems reviewed, negative other than as stated in the HPI    Physical ExamPhysical Exam:    Vitals:    06/19/18 0903 06/19/18 0913   BP: (!) 180/110 (!) 178/110   Pulse: (!) 114    Resp: 16    SpO2: 96%    Weight: 161 lb 4.8 oz (73.2 kg)    Height: 5' 4\" (1.626 m)      Body mass index is 27.69 kg/(m^2). General PE   Gen:  NAD  Mental Status - Alert. General Appearance - Not in acute distress. Chest and Lung Exam   Inspection: Accessory muscles - No use of accessory muscles in breathing. Auscultation:   Breath sounds: - Normal.   Cardiovascular   Inspection: Jugular vein - Bilateral - Inspection Normal.   Palpation/Percussion:   Apical Impulse: - Normal.   Auscultation: Rhythm - Regular. Heart Sounds - S1 WNL and S2 WNL. No S3 or S4. Murmurs & Other Heart Sounds: Auscultation of the heart reveals - No Murmurs. Peripheral Vascular   Upper Extremity: Inspection - Bilateral - No Cyanotic nailbeds or Digital clubbing. Lower Extremity:   Palpation: Edema - Bilateral - trace chronic dependent edema  Abdomen:   Soft, non-tender, bowel sounds are active.   Neuro: A&O times 3, CN and motor grossly WNL    Labs:   Lab Results   Component Value Date/Time    Cholesterol, total 148 07/19/2017 12:00 AM    Cholesterol, total 158 07/12/2016 07:48 AM    Cholesterol, total 154 02/12/2015 12:00 AM    Cholesterol, total 167 07/14/2014 12:00 AM    Cholesterol, total 182 01/24/2014 09:22 AM    HDL Cholesterol 43 07/19/2017 12:00 AM    HDL Cholesterol 45 07/12/2016 07:48 AM    HDL Cholesterol 42 02/12/2015 12:00 AM    HDL Cholesterol 47 07/14/2014 12:00 AM    HDL Cholesterol 54 01/24/2014 09:22 AM    LDL, calculated 82 07/19/2017 12:00 AM    LDL, calculated 88 07/12/2016 07:48 AM    LDL, calculated 93 02/12/2015 12:00 AM    LDL, calculated 101 (H) 07/14/2014 12:00 AM    LDL, calculated 105 (H) 01/24/2014 09:22 AM    Triglyceride 115 07/19/2017 12:00 AM    Triglyceride 127 07/12/2016 07:48 AM    Triglyceride 94 02/12/2015 12:00 AM    Triglyceride 95 07/14/2014 12:00 AM    Triglyceride 113 01/24/2014 09:22 AM     Lab Results   Component Value Date/Time    CK 33 06/12/2018 11:50 AM     Lab Results   Component Value Date/Time    Sodium 139 06/14/2018 10:10 AM    Potassium 3.4 (L) 06/14/2018 10:10 AM    Chloride 104 06/14/2018 10:10 AM    CO2 28 06/14/2018 10:10 AM    Anion gap 7 06/14/2018 10:10 AM    Glucose 108 (H) 06/14/2018 10:10 AM    BUN 17 06/14/2018 10:10 AM    Creatinine 1.07 (H) 06/14/2018 10:10 AM    BUN/Creatinine ratio 16 06/14/2018 10:10 AM    GFR est AA 59 (L) 06/14/2018 10:10 AM    GFR est non-AA 49 (L) 06/14/2018 10:10 AM    Calcium 9.9 06/14/2018 10:10 AM    Bilirubin, total 0.5 06/12/2018 11:50 AM    AST (SGOT) 28 06/12/2018 11:50 AM    Alk. phosphatase 100 06/12/2018 11:50 AM    Protein, total 7.8 06/12/2018 11:50 AM    Albumin 4.1 06/12/2018 11:50 AM    Globulin 3.7 06/12/2018 11:50 AM    A-G Ratio 1.1 06/12/2018 11:50 AM    ALT (SGPT) 30 06/12/2018 11:50 AM       EKG  SR LBBB     Assessment:     Assessment:      1. Chest tightness    2. SOB (shortness of breath)    3. Fatigue, unspecified type    4. Encounter to establish care    5. LBBB (left bundle branch block)    6. Hypertension, essential    7.  Hypercholesterolemia        Orders Placed This Encounter    AMB POC EKG ROUTINE W/ 12 LEADS, INTER & REP     Order Specific Question:   Reason for Exam:     Answer:   routine    LEXISCAN/CARDIOLITE, Clinic Performed     Standing Status:   Future     Standing Expiration Date:   12/19/2018 Order Specific Question:   Reason for Exam:     Answer:   razo    raNITIdine (ZANTAC) 150 mg tablet    aspirin 81 mg chewable tablet     Sig: Take 1 Tab by mouth daily. Dispense:  30 Tab     Refill:  0    metoprolol succinate (TOPROL-XL) 25 mg XL tablet     Sig: Take 0.5 Tabs by mouth daily. Dispense:  16 Tab     Refill:  3        Plan:     Pt is an 80 y.o. female with pmhx HTN, GERD, spinal stenosis is here to establish care and further evaluation of razo, fatigue, dizziness, chest tightness. RAZO, dizziness,  Fatigue, chest tightness   Cardiac risk factors: dyslipidemia, obesity, hypertension, stress, post-menopausal, family hx of CAD (mother had a heart attack, brother had heart attack and other brother valve replacement), former smoker  Neg troponin, EKG LBBB in ED 6/14/18  EF 45-50%, mild MR per echo 6/18  Will start baby ASA, Toprol XL 12.5 mg. She denies allergy to Atenolol or Bystolic. Continue CCB, statin  Will obtain Lexiscan       HTN  Elevated. Has not taken any of her medications. In clinic, given 0.1 mg of Clonidine  Continue on current medications Norvasc, Losartan, clonidine,lasix. HLD  7/17 LDL at 82. On statin  Lipids and labs followed by PCP        Continue current care and f/u when testing complete    Stacey Cervantes NP       [facility]    6/19/2018         Patient seen, examined by me personally. Plan discussed as detailed. Agree with note as outlined by  NP. I confirm findings in history and physical exam. No additional findings noted. Agree with plan as outlined above. Patient presents with SOB, abnormal echo, new LBBB. Will evaluate with pharmacologic stress test. Discussed cath/PCI based on stress test results.     Elias Trevino MD

## 2018-07-05 NOTE — PROGRESS NOTES
Problem: Falls - Risk of  Goal: *Absence of Falls  Document Juan Fall Risk and appropriate interventions in the flowsheet. Outcome: Progressing Towards Goal  Fall Risk Interventions:                               Problem: Pressure Injury - Risk of  Goal: *Prevention of pressure injury  Document Cheo Scale and appropriate interventions in the flowsheet.   Outcome: Progressing Towards Goal  Pressure Injury Interventions:

## 2018-07-05 NOTE — INTERVAL H&P NOTE
H&P Update:  Kandi Sofia was seen and examined. History and physical has been reviewed. The patient has been examined.  There have been no significant clinical changes since the completion of the originally dated History and Physical.    Signed By: [address], MD     July 5, 2018 10:13 AM

## 2018-07-05 NOTE — PROCEDURES
LM-normal  LAD-mid 95%, calcified   LCX-30% mid . RCA-mid 90%. LV-60%. Right radial access. No complications. EBL-minimal.   Specimen-none. Reviewed with Dr. Haylee Ruby. PCI of LAD/RCA.

## 2018-07-06 VITALS
HEIGHT: 64 IN | HEART RATE: 56 BPM | OXYGEN SATURATION: 98 % | TEMPERATURE: 97.1 F | SYSTOLIC BLOOD PRESSURE: 165 MMHG | WEIGHT: 161 LBS | BODY MASS INDEX: 27.49 KG/M2 | DIASTOLIC BLOOD PRESSURE: 72 MMHG | RESPIRATION RATE: 16 BRPM

## 2018-07-06 PROCEDURE — 74011250637 HC RX REV CODE- 250/637: Performed by: INTERNAL MEDICINE

## 2018-07-06 PROCEDURE — 74011250637 HC RX REV CODE- 250/637: Performed by: NURSE PRACTITIONER

## 2018-07-06 RX ORDER — CLOPIDOGREL BISULFATE 75 MG/1
75 TABLET ORAL DAILY
Qty: 30 TAB | Refills: 11 | Status: SHIPPED | OUTPATIENT
Start: 2018-07-06 | End: 2018-11-19 | Stop reason: SDUPTHER

## 2018-07-06 RX ADMIN — CLONIDINE HYDROCHLORIDE 0.1 MG: 0.1 TABLET ORAL at 09:14

## 2018-07-06 RX ADMIN — FAMOTIDINE 20 MG: 20 TABLET ORAL at 09:15

## 2018-07-06 RX ADMIN — METOPROLOL SUCCINATE 12.5 MG: 25 TABLET, EXTENDED RELEASE ORAL at 09:11

## 2018-07-06 RX ADMIN — ASPIRIN 81 MG 81 MG: 81 TABLET ORAL at 09:15

## 2018-07-06 RX ADMIN — VALSARTAN 160 MG: 160 TABLET, FILM COATED ORAL at 09:12

## 2018-07-06 RX ADMIN — AMLODIPINE BESYLATE 5 MG: 5 TABLET ORAL at 09:12

## 2018-07-06 RX ADMIN — CLOPIDOGREL BISULFATE 75 MG: 75 TABLET ORAL at 09:13

## 2018-07-06 RX ADMIN — CIPROFLOXACIN HYDROCHLORIDE 250 MG: 250 TABLET, FILM COATED ORAL at 09:12

## 2018-07-06 RX ADMIN — ACETAMINOPHEN 650 MG: 325 TABLET ORAL at 04:24

## 2018-07-06 RX ADMIN — FUROSEMIDE 40 MG: 40 TABLET ORAL at 09:12

## 2018-07-06 RX ADMIN — Medication 400 MG: at 09:14

## 2018-07-06 NOTE — PROGRESS NOTES
I have reviewed discharge instructions with the patient and caregiver. The patient and caregiver verbalized understanding. RN have reviewed medications and side effects with pt. Regarding new prescriptions. Discharge instruction reviewed with pt. And family. Pt. Demonstrates understanding. Allowed adequate time to ask questions, all questions answered. Printed copy of AVS given to pt. All belongings gathered, IV and Tele discontinue. Transported pt. Via wheelchair to main entrance and into care of family.

## 2018-07-06 NOTE — CARDIO/PULMONARY
CP Rehab Note: chart review    Admit: cath with stents    Mhx: HTN, GERD, EF 60% on 7/5/18. Never smoked. Printed material given and discussed re: heart healthy habits, the cardiac diet, medication management, what to expect following coronary angioplasty, and post cardiac catheterization instructions. Discussed post catheterization restrictions, including:no manipulating the wrist for 24 hours, no lifting for 7 days, no soaking the wrist for 3 days . Discussed post catheterization restrictions including no lifting, no tub baths and no straining for 7 days. Also discussed what to do if bleeding or bruising at the cath insertion site is observed. Reviewed the cardiac diet (low NA/fat/CHOL), the importance of medication compliance, monitoring for any unusual signs & symptoms and when to call the doctor. Scheduled patient for CP Rehab orientation on 9/4/18 at 1PM. Patient provided packet and instructed to complete a couple days prior to appointment. Patient has no additional questions at this time as she is preparing for discharge.

## 2018-07-06 NOTE — PROGRESS NOTES
Problem: Falls - Risk of  Goal: *Absence of Falls  Document Juan Fall Risk and appropriate interventions in the flowsheet. Outcome: Progressing Towards Goal  Fall Risk Interventions:            Medication Interventions: Assess postural VS orthostatic hypotension, Teach patient to arise slowly, Patient to call before getting OOB, Evaluate medications/consider consulting pharmacy                  Problem: Pressure Injury - Risk of  Goal: *Prevention of pressure injury  Document Cheo Scale and appropriate interventions in the flowsheet. Outcome: Progressing Towards Goal  Pressure Injury Interventions:                 Mobility Interventions: Assess need for specialty bed, Float heels, HOB 30 degrees or less, Turn and reposition approx.  every two hours(pillow and wedges)

## 2018-07-06 NOTE — PROGRESS NOTES
Pt. Ambulate to bathroom and back to chair without difficulty. Asymptomatic. No report of pain, SOB.

## 2018-07-06 NOTE — PROGRESS NOTES
[address] [address]  [phone]      Cardiology Progress Note      7/6/2018 12:21 PM    Admit Date: 7/5/2018    Admit Diagnosis:   cath    Subjective: Pura Renee has no c/o CP, SOB. S/P PCI/JAMISON LAD, and RCA    Visit Vitals    /72 (BP 1 Location: Left arm, BP Patient Position: At rest)    Pulse (!) 56    Temp 97.1 °F (36.2 °C)    Resp 16    Ht 5' 4\" (1.626 m)    Wt 73 kg (161 lb)    SpO2 98%    BMI 27.64 kg/m2       Current Facility-Administered Medications   Medication Dose Route Frequency    aspirin chewable tablet 81 mg  81 mg Oral DAILY    clopidogrel (PLAVIX) 300 mg        amLODIPine (NORVASC) tablet 5 mg  5 mg Oral DAILY    atorvastatin (LIPITOR) tablet 20 mg  20 mg Oral QHS    cloNIDine HCl (CATAPRES) tablet 0.1 mg  0.1 mg Oral BID    furosemide (LASIX) tablet 40 mg  40 mg Oral DAILY    valsartan (DIOVAN) tablet 160 mg  160 mg Oral DAILY    magnesium oxide (MAG-OX) tablet 400 mg  400 mg Oral DAILY    metoprolol succinate (TOPROL-XL) XL tablet 12.5 mg  12.5 mg Oral DAILY    famotidine (PEPCID) tablet 20 mg  20 mg Oral DAILY    clopidogrel (PLAVIX) tablet 75 mg  75 mg Oral DAILY    sodium chloride (NS) flush 5-10 mL  5-10 mL IntraVENous Q8H    sodium chloride (NS) flush 5-10 mL  5-10 mL IntraVENous PRN    acetaminophen (TYLENOL) tablet 650 mg  650 mg Oral Q4H PRN    ciprofloxacin HCl (CIPRO) tablet 250 mg  250 mg Oral BID     Current Outpatient Prescriptions   Medication Sig    clopidogrel (PLAVIX) 75 mg tab Take 1 Tab by mouth daily.  ciprofloxacin HCl (CIPRO) 250 mg tablet Take 1 Tab by mouth two (2) times a day for 7 days. Indications: BACTERIAL URINARY TRACT INFECTION    raNITIdine (ZANTAC) 150 mg tablet     aspirin 81 mg chewable tablet Take 1 Tab by mouth daily.  metoprolol succinate (TOPROL-XL) 25 mg XL tablet Take 0.5 Tabs by mouth daily.     cloNIDine HCl (CATAPRES) 0.1 mg tablet Take 1 Tab by mouth two (2) times a day. Indications: hypertension    amLODIPine (NORVASC) 5 mg tablet Take 1 Tab by mouth daily. (Patient taking differently: Take 5 mg by mouth two (2) times a day.)    furosemide (LASIX) 40 mg tablet TAKE ONE TABLET BY MOUTH ONCE DAILY    atorvastatin (LIPITOR) 20 mg tablet TAKE ONE TABLET BY MOUTH ONCE DAILY    losartan (COZAAR) 100 mg tablet TAKE ONE TABLET BY MOUTH ONCE DAILY    acetaminophen (TYLENOL ARTHRITIS PAIN) 650 mg TbER Take 650 mg by mouth every eight (8) hours.  cholecalciferol (VITAMIN D3) 1,000 unit cap Take  by mouth daily.  magnesium oxide (MAG-OX) 400 mg tablet Take 1 Tab by mouth daily.  polyethylene glycol (MIRALAX) 17 gram/dose powder Take 17 g by mouth daily. Objective:      Physical Exam:  General Appearance:  elderly  female in no acute distress  Chest:   Clear  Cardiovascular:  Regular rate and rhythm, no murmur.   Abdomen:   Soft, non-tender, bowel sounds are active.   Extremities: right radial site D/I, no hematoma  Skin:  Warm and dry.     Data Review:   No results for input(s): WBC, HGB, HCT, PLT, HGBEXT, HCTEXT, PLTEXT in the last 72 hours. No results for input(s): NA, K, CL, CO2, GLU, BUN, CREA, CA, MG, PHOS, ALB, TBIL, TBILI, SGOT, ALT, INR in the last 72 hours. No lab exists for component: INREXT    No results for input(s): TROIQ, CPK, CKMB in the last 72 hours. No intake or output data in the 24 hours ending 07/06/18 1221     Telemetry: SR    Assessment:     Active Problems:    UTI (urinary tract infection) (7/5/2018)      Coronary artery disease involving native coronary artery of native heart with angina pectoris with documented spasm ([facility] [address] (7/5/2018)      Overview: 7/2018 PCI of LAD and RCA      S/P cardiac cath (7/5/2018)      Overview: 7/5/18: stents to LAD and RCA        Plan:     CAD:  S/p PCI/JAMISON to LAD and RCA  Start on Plavix 75mg daily x 1 year, continue on ASA, BB, statin. UTI:  Continue on Cipro for scheduled dosing.      Follow up with Dr. Uvaldo Jules Cardiology             Patient seen, examined by me personally. Plan discussed as detailed. Agree with note as outlined by  NP. I confirm findings in history and physical exam. No additional findings noted. Agree with plan as outlined above.      Bobby Coates MD

## 2018-07-06 NOTE — DISCHARGE INSTRUCTIONS
[id number] [phone]        Patient ID:  Raghu Montemayor  [id number]  83 y.o.  1934    Admit Date: 7/5/2018    Discharge Date: 7/6/2018     Admitting Physician: Dirk Flores MD     Discharge Physician: Zehra Botello NP    Admission Diagnoses:   cath    Discharge Diagnoses: Active Problems:    UTI (urinary tract infection) (7/5/2018)      Coronary artery disease involving native coronary artery of native heart with angina pectoris with documented spasm ([address] (7/5/2018)      Overview: 7/2018 PCI of LAD and RCA      S/P cardiac cath (7/5/2018)      Overview: 7/5/18: stents to LAD and RCA        Discharge Condition: Good    Cardiology Procedures this Admission:  Left heart catheterization with PCI    Disposition: home    Reference discharge instructions provided by nursing for diet and activity. Signed:  Zehra Botello NP  7/6/2018  8:23 AM      Radial Cardiac Catheterization/Angiography Discharge Instructions    It is normal to feel tired the first couple days. Take it easy and follow the physicians instructions. CHECK THE CATHETER INSERTION SITE DAILY:    Remove the wrist dressing 24 hours after the procedure. You may shower 24 hours after the procedure. Wash with soap and water and pat dry. Gentle cleaning of the site with soap and water is sufficient, cover with a dry clean dressing or bandage. Do not apply creams or powders to the area. No soaking the wrist for 3 days. Leave the puncture site open to air after 24 hours post-procedure. CALL THE PHYSICIANS:     If the site becomes red, swollen or feels warm to the touch  If there is bleeding or drainage or if there is unusual pain at the radial site. If there is any minor oozing, you may apply a band-aid and remove after 12 hours.    If the bleeding continues, hold pressure with the middle finger against the puncture site and the thumb against the back of the wrist,call 911 to be transported to the hospital.  DO NOT DRIVE YOURSELF, [place] [address]. ACTIVITY:   For the first 24 hours do not manipulate the wrist.  No lifting, pushing or pulling over 3-5 pounds with the affected wrist for 7 daysand no straining the insertion site. Do not life grocery bags or the garbage can, do not run the vacuum  or  for 7 days. Start with short walks as in the hospital and gradually increase as tolerated each day. It is recommended to walk 30 minutes 5-7 days per week. Follow your physicians instructions on activity. Avoid walking outside in extremes of heat or cold. Walk inside when it is cold and windy or hot and humid. Things to keep in mind:  No driving for at least 24 hours, or as designated by your physician. Limit the number of times you go up and down the stairs  Take rests and pace yourself with activity. Be careful and do not strain with bowel movements. MEDICATIONS:    Take all medications as prescribed  Call your physician if you have any questions  Keep an updated list of your medications with you at all times and give a list to your physician and pharmacist    SIGNS AND SYMPTOMS:   Be cautious of symptoms of angina or recurrent symptoms such as chest discomfort, unusual shortness of breath or fatigue. These could be symptoms of restenosis, a new blockage or a heart attack. If your symptoms are relieved with rest it is still recommended that you notify your physician of recurrent chest pain or discomfort. For CHEST PAIN or symptoms of angina not relieved with rest:  If the discomfort is not relieved with rest, and you have been prescribed Nitroglycerin, take as directed (taken under the tongue, one at a time 5 minutes apart for a total of 3 doses). If the discomfort is not relieved after the 3rd nitroglycerin, call 911.   If you have not been prescribed Nitroglycerin  and your chest discomfort is not relieved with rest, call 911. AFTER CARE:   Follow up with your physician as instructed. Follow a heart healthy diet with proper portion control, daily stress management, daily exercise, blood pressure and cholesterol control , and smoking cessation.

## 2018-07-17 ENCOUNTER — OFFICE VISIT (OUTPATIENT)
Dept: CARDIOLOGY CLINIC | Age: 83
End: 2018-07-17

## 2018-07-17 VITALS
OXYGEN SATURATION: 93 % | WEIGHT: 159.4 LBS | HEART RATE: 49 BPM | DIASTOLIC BLOOD PRESSURE: 74 MMHG | BODY MASS INDEX: 27.21 KG/M2 | SYSTOLIC BLOOD PRESSURE: 132 MMHG | HEIGHT: 64 IN | RESPIRATION RATE: 16 BRPM

## 2018-07-17 DIAGNOSIS — Z98.890 S/P CARDIAC CATH: ICD-10-CM

## 2018-07-17 DIAGNOSIS — E78.00 HYPERCHOLESTEROLEMIA: ICD-10-CM

## 2018-07-17 DIAGNOSIS — I10 HYPERTENSION, ESSENTIAL: ICD-10-CM

## 2018-07-17 DIAGNOSIS — I25.10 ASHD (ARTERIOSCLEROTIC HEART DISEASE): Primary | ICD-10-CM

## 2018-07-17 RX ORDER — FUROSEMIDE 40 MG/1
20 TABLET ORAL DAILY
Qty: 90 TAB | Refills: 1 | Status: SHIPPED | OUTPATIENT
Start: 2018-07-17 | End: 2018-07-19 | Stop reason: SDUPTHER

## 2018-07-17 NOTE — PROGRESS NOTES
Susana Ba DNP, ANP-BC  Subjective/HPI:     Bebeto Garcia is a 80 y.o. female is here for hospital follow-up after PTCA stenting of the LAD and RCA. He reports feeling well in her usual state of health with the exception of some dizziness with positional change. She reports maintaining a well-hydrated status. Tolerating all new medications well without side effects. PCP Provider  Ellen Post MD  Past Medical History:   Diagnosis Date    GERD (gastroesophageal reflux disease)     Hypercholesterolemia     Hypertension     SOB (shortness of breath) 6/14/2018    UTI (urinary tract infection) 7/5/2018      Past Surgical History:   Procedure Laterality Date    ABDOMEN SURGERY PROC UNLISTED  2005    Laproscopic colon polyp excision Dr. Janey Bryan      Age 25    HX HEENT Bilateral     cataracts    HX ORTHOPAEDIC  1996    Lumbar laminectomy    IL COLSC FLX W/RMVL OF TUMOR POLYP LESION SNARE TQ  7/18/2014          Allergies   Allergen Reactions    Contrast Agent [Iodine] Hives    Penicillins Hives    Bystolic [Nebivolol] Other (comments)     abd pain    Cardizem [Diltiazem Hcl] Rash    Cardura [Doxazosin] Unknown (comments)    Codeine Nausea Only    Cymbalta [Duloxetine] Other (comments)     Abdominal pain, anxiety    Gabapentin Other (comments)     Made her feel crazy    Prinivil [Lisinopril] Unknown (comments)    Tekturna [Aliskiren] Other (comments)     abd cramps    Tenormin [Atenolol] Unknown (comments)      Family History   Problem Relation Age of Onset    Heart Disease Mother     Stroke Mother     Cancer Father     Cancer Brother     Heart Disease Brother     Cancer Brother       Current Outpatient Prescriptions   Medication Sig    furosemide (LASIX) 40 mg tablet Take 0.5 Tabs by mouth daily. Reduced 7/17/2018    clopidogrel (PLAVIX) 75 mg tab Take 1 Tab by mouth daily.  raNITIdine (ZANTAC) 150 mg tablet Take 150 mg by mouth as needed.     aspirin 81 mg chewable tablet Take 1 Tab by mouth daily.  metoprolol succinate (TOPROL-XL) 25 mg XL tablet Take 0.5 Tabs by mouth daily.  acetaminophen (TYLENOL ARTHRITIS PAIN) 650 mg TbER Take 650 mg by mouth every eight (8) hours.  cloNIDine HCl (CATAPRES) 0.1 mg tablet Take 1 Tab by mouth two (2) times a day. Indications: hypertension    amLODIPine (NORVASC) 5 mg tablet Take 1 Tab by mouth daily.  atorvastatin (LIPITOR) 20 mg tablet TAKE ONE TABLET BY MOUTH ONCE DAILY    losartan (COZAAR) 100 mg tablet TAKE ONE TABLET BY MOUTH ONCE DAILY    magnesium oxide (MAG-OX) 400 mg tablet Take 1 Tab by mouth daily.  polyethylene glycol (MIRALAX) 17 gram/dose powder Take 17 g by mouth daily.  cholecalciferol (VITAMIN D3) 1,000 unit cap Take  by mouth daily. No current facility-administered medications for this visit. Vitals:    07/17/18 1337 07/17/18 1359 07/17/18 1400   BP: 140/80 (!) 150/92 132/74   Pulse: (!) 49     Resp: 16     SpO2: 93%     Weight: 159 lb 6.4 oz (72.3 kg)     Height: 5' 4\" (1.626 m)       Social History     Social History    Marital status:      Spouse name: N/A    Number of children: N/A    Years of education: N/A     Occupational History    Not on file. Social History Main Topics    Smoking status: Never Smoker    Smokeless tobacco: Never Used    Alcohol use No    Drug use: Not on file    Sexual activity: Not on file     Other Topics Concern    Not on file     Social History Narrative       I have reviewed the nurses notes, vitals, problem list, allergy list, medical history, family, social history and medications. Review of Symptoms:    General: Pt denies excessive weight gain or loss. Pt is able to conduct ADL's  HEENT: Denies blurred vision, headaches, epistaxis and difficulty swallowing. Respiratory: Denies shortness of breath, RAZO, wheezing or stridor.   Cardiovascular: Denies precordial pain, palpitations, edema or PND  Gastrointestinal: Denies poor appetite, indigestion, abdominal pain or blood in stool  Musculoskeletal: Denies pain or swelling from muscles or joints  Neurologic: Denies tremor, paresthesias, or sensory motor disturbance the exception of mild dizziness with positional change as reported in the HPI  Skin: Denies rash, itching or texture change. Physical Exam:      General: Well developed, in no acute distress, cooperative and alert  HEENT: No carotid bruits, no JVD, trach is midline. Neck Supple, PEERL, EOM intact. Heart:  Normal S1/S2 negative S3 or S4. Regular, no murmur, gallop or rub.   Respiratory: Clear bilaterally x 4, no wheezing or rales  Abdomen:   Soft, non-tender, no masses, bowel sounds are active.   Extremities:  No edema, normal cap refill, no cyanosis, atraumatic. The right femoral access site has a small area of ecchymosis, nontender no bruit. The right radial first attempt access site has a mild amount of ecchymosis nontender with palpation neurovascularly intact. Neuro: A&Ox3, speech clear, gait stable. Skin: Skin color is normal. No rashes or lesions.  Non diaphoretic  Vascular: 2+ pulses symmetric in all extremities    Cardiographics    ECG:   Results for orders placed or performed during the hospital encounter of 07/05/18   EKG, 12 LEAD, INITIAL   Result Value Ref Range    Ventricular Rate 59 BPM    Atrial Rate 59 BPM    P-R Interval 260 ms    QRS Duration 136 ms    Q-T Interval 514 ms    QTC Calculation (Bezet) 508 ms    Calculated P Axis 41 degrees    Calculated R Axis -26 degrees    Calculated T Axis 112 degrees    Diagnosis       Sinus bradycardia with 1st degree AV block  Left bundle branch block  When compared with ECG of 14-JUN-2018 09:55,  NC interval has increased  Confirmed by Edwar Jacobson ([id number]) on 7/5/2018 12:29:18 PM           Cardiology Labs:  Lab Results   Component Value Date/Time    Cholesterol, total 148 07/19/2017 12:00 AM    HDL Cholesterol 43 07/19/2017 12:00 AM    LDL, calculated 82 07/19/2017 12:00 AM    Triglyceride 115 07/19/2017 12:00 AM       Lab Results   Component Value Date/Time    Sodium 139 06/14/2018 10:10 AM    Potassium 3.4 (L) 06/14/2018 10:10 AM    Chloride 104 06/14/2018 10:10 AM    CO2 28 06/14/2018 10:10 AM    Anion gap 7 06/14/2018 10:10 AM    Glucose 108 (H) 06/14/2018 10:10 AM    BUN 17 06/14/2018 10:10 AM    Creatinine 1.07 (H) 06/14/2018 10:10 AM    BUN/Creatinine ratio 16 06/14/2018 10:10 AM    GFR est AA 59 (L) 06/14/2018 10:10 AM    GFR est non-AA 49 (L) 06/14/2018 10:10 AM    Calcium 9.9 06/14/2018 10:10 AM    Bilirubin, total 0.5 06/12/2018 11:50 AM    AST (SGOT) 28 06/12/2018 11:50 AM    Alk. phosphatase 100 06/12/2018 11:50 AM    Protein, total 7.8 06/12/2018 11:50 AM    Albumin 4.1 06/12/2018 11:50 AM    Globulin 3.7 06/12/2018 11:50 AM    A-G Ratio 1.1 06/12/2018 11:50 AM    ALT (SGPT) 30 06/12/2018 11:50 AM           Assessment:     Assessment:     Diagnoses and all orders for this visit:    1. ASHD (arteriosclerotic heart disease)    2. Hypercholesterolemia  -     AMB POC EKG ROUTINE W/ 12 LEADS, INTER & REP  -     METABOLIC PANEL, COMPREHENSIVE  -     CK  -     LIPID PANEL    3. S/P cardiac cath  -     METABOLIC PANEL, COMPREHENSIVE  -     CK  -     LIPID PANEL    4. Hypertension, essential    Other orders  -     furosemide (LASIX) 40 mg tablet; Take 0.5 Tabs by mouth daily. Reduced 7/17/2018        ICD-10-CM ICD-9-CM    1. ASHD (arteriosclerotic heart disease) I25.10 414.00    2. Hypercholesterolemia E78.00 272.0 AMB POC EKG ROUTINE W/ 12 LEADS, INTER & REP      METABOLIC PANEL, COMPREHENSIVE      CK      LIPID PANEL   3. S/P cardiac cath Q87.120 L18.81 METABOLIC PANEL, COMPREHENSIVE      CK      LIPID PANEL   4.  Hypertension, essential I10 401.9      Orders Placed This Encounter    METABOLIC PANEL, COMPREHENSIVE    CK    LIPID PANEL    AMB POC EKG ROUTINE W/ 12 LEADS, INTER & REP     Order Specific Question:   Reason for Exam:     Answer: Routine    furosemide (LASIX) 40 mg tablet     Sig: Take 0.5 Tabs by mouth daily. Reduced 7/17/2018     Dispense:  90 Tab     Refill:  1        Plan:     1. Atherosclerotic heart disease/status post PTCA stenting of the RCA and LAD: Continue current medications including dual antiplatelet therapy uninterrupted for 1 year. 2.  Hypertension controlled 132/74 continue current medications  3. Hyperlipidemia: On statin therapy due for repeat labs, goal LDL less than or equal to 70. Patient may return to her walking program at the gym and massage therapy of her C-spine. Follow-up with cardiology in 6 months. Damaris Taylor NP    This note was created using voice recognition software. Despite editing, there may be syntax errors.

## 2018-07-17 NOTE — LETTER
NOTIFICATION OF RETURN THERAPY 
 
7/17/2018 2:26 PM 
 
Ms. Bill Evangelista 
[address] [facility] [address] [phone] Tanja Shane To Whom It May Concern: 
 
Bill Evangelista is under the care of [facility] Patel She will be able to return to massage therapy without restrictions If there are questions or concerns please have the patient contact our office. Sincerely, Henry Fernandes NP

## 2018-07-17 NOTE — PROGRESS NOTES
1. Have you been to the ER, urgent care clinic since your last visit? Hospitalized since your last visit? YES, on 7/5/18 for UTI at [address] and stent placement on 7/5/18    2. Have you seen or consulted any other health care providers outside of the [facility] since your last visit? Include any pap smears or colon screening.   No    Chief Complaint   Patient presents with   [facility] Follow Up     for UTI    Dizziness     pt reports positional position from sitting to standing

## 2018-07-17 NOTE — MR AVS SNAPSHOT
Helen Ojeda Tavares [address] [facility] 
[phone] Patient: Raman Quezada MRN:  [id number] Visit Information Date & Time Provider Department Dept. Phone Encounter #  
 7/17/2018  1:45 PM [facility] [phone] Your Appointments 7/25/2018  9:15 AM  
ACUTE CARE with Bennett Aburto MD  
[facility]) Appt Note: 6wk f/u;  HTN  
 [address] [id number] [phone]  
  
   
 [address] 1/16/2019 11:00 AM  
ESTABLISHED PATIENT with MD Sagar [facility]) Appt Note: 6 month f/u  
 [address]  
[phone] [address] Upcoming Health Maintenance Date Due  
 GLAUCOMA SCREENING Q2Y 3/1/2018 Influenza Age 5 to Adult 8/1/2018 MEDICARE YEARLY EXAM 4/17/2019 DTaP/Tdap/Td series (2 - Td) 7/16/2023 Allergies as of 7/17/2018  Review Complete On: 7/17/2018 By: Jann Galvan NP Severity Noted Reaction Type Reactions Contrast Agent [Iodine] High 03/01/2010    Hives Penicillins High 03/01/2010    Hives Bystolic [Nebivolol]  62/11/2453    Other (comments)  
 abd pain  
 Cardizem [Diltiazem Hcl]  03/01/2010    Rash Cardura [Doxazosin]  03/01/2010    Unknown (comments) Codeine  03/01/2010    Nausea Only Cymbalta [Duloxetine]  01/19/2017    Other (comments) Abdominal pain, anxiety Gabapentin  03/02/2017    Other (comments) Made her feel crazy Prinivil [Lisinopril]  03/01/2010    Unknown (comments) Tekturna [Aliskiren]  03/01/2010    Other (comments)  
 abd cramps Tenormin [Atenolol]  03/01/2010    Unknown (comments) Current Immunizations  Reviewed on 6/4/2018 Name Date Influenza High Dose Vaccine PF 12/12/2016, 10/26/2015, 10/3/2014 Influenza Vaccine 9/17/2013 Influenza Vaccine Sp [place]stein) 11/24/2017 Pneumococcal Conjugate (PCV-13) 4/3/2015 TD Vaccine 6/1/2007 Tdap 7/16/2013 ZZZ-RETIRED (DO NOT USE) Pneumococcal Vaccine (Unspecified Type) 10/1/2002 Zoster 6/30/2009 Not reviewed this visit You Were Diagnosed With   
  
 Codes Comments Hypercholesterolemia    -  Primary ICD-10-CM: E78.00 ICD-9-CM: 272.0 S/P cardiac cath     ICD-10-CM: Z98.890 ICD-9-CM: V45.89 Vitals BP Pulse Resp Height(growth percentile) Weight(growth percentile) SpO2  
 132/74 (BP 1 Location: Right arm, BP Patient Position: Standing) (!) 49 16 5' 4\" (1.626 m) 159 lb 6.4 oz (72.3 kg) 93% BMI OB Status Smoking Status 27.36 kg/m2 Postmenopausal Never Smoker Vitals History BMI and BSA Data Body Mass Index Body Surface Area  
 27.36 kg/m 2 1.81 m 2 Preferred Pharmacy Pharmacy Name Phone [facility] [address] [place] [address] [phone] Your Updated Medication List  
  
   
This list is accurate as of 7/17/18  2:33 PM.  Always use your most recent med list. amLODIPine 5 mg tablet Commonly known as:  Daisy Croon Take 1 Tab by mouth daily. aspirin 81 mg chewable tablet Take 1 Tab by mouth daily. atorvastatin 20 mg tablet Commonly known as:  LIPITOR  
TAKE ONE TABLET BY MOUTH ONCE DAILY  
  
 cholecalciferol 1,000 unit Cap Commonly known as:  VITAMIN D3 Take  by mouth daily. cloNIDine HCl 0.1 mg tablet Commonly known as:  CATAPRES Take 1 Tab by mouth two (2) times a day. Indications: hypertension  
  
 clopidogrel 75 mg Tab Commonly known as:  PLAVIX Take 1 Tab by mouth daily. furosemide 40 mg tablet Commonly known as:  LASIX Take 0.5 Tabs by mouth daily. Reduced 7/17/2018  
  
 losartan 100 mg tablet Commonly known as:  COZAAR  
 TAKE ONE TABLET BY MOUTH ONCE DAILY  
  
 magnesium oxide 400 mg tablet Commonly known as:  MAG-OX Take 1 Tab by mouth daily. metoprolol succinate 25 mg XL tablet Commonly known as:  TOPROL-XL Take 0.5 Tabs by mouth daily. MIRALAX 17 gram/dose powder Generic drug:  polyethylene glycol Take 17 g by mouth daily. raNITIdine 150 mg tablet Commonly known as:  ZANTAC Take 150 mg by mouth as needed. TYLENOL ARTHRITIS PAIN 650 mg [place] Generic drug:  acetaminophen Take 650 mg by mouth every eight (8) hours. Prescriptions Sent to Pharmacy Refills  
 furosemide (LASIX) 40 mg tablet 1 Sig: Take 0.5 Tabs by mouth daily. Reduced 7/17/2018 Class: Normal  
 Pharmacy: [facility] [address] MAYANK DEL RIO [address] [place] [address] Ph #: [phone] Route: Oral  
  
We Performed the Following AMB POC EKG ROUTINE W/ 12 LEADS, INTER & REP [13404 CPT(R)] CK [id number] CPT(R)] LIPID PANEL [65180 CPT(R)] METABOLIC PANEL, COMPREHENSIVE [05176 CPT(R)] To-Do List   
 09/04/2018 1:00 PM  
  Appointment with [facility] CARDIOPULM SPECIALTY at [facility] ([phone]) [facility]! Abhishek Bonner introduces [employer] patient portal. Now you can access parts of your medical record, email your doctor's office, and request medication refills online. 1. In your internet browser, go to [email or web address] 2. Click on the First Time User? Click Here link in the Sign In box. You will see the New Member Sign Up page. 3. Enter your [employer] Access Code exactly as it appears below. You will not need to use this code after youve completed the sign-up process. If you do not sign up before the expiration date, you must request a new code. · [employer] Access Code: [email or web address] Expires: 10/15/2018  1:26 PM 
 
4.  Enter the last four digits of your Social Security Number (xxxx) and Date of Birth (mm/dd/yyyy) as indicated and click Submit. You will be taken to the next sign-up page. 5. Create a [employer] ID. This will be your [employer] login ID and cannot be changed, so think of one that is secure and easy to remember. 6. Create a [employer] password. You can change your password at any time. 7. Enter your Password Reset Question and Answer. This can be used at a later time if you forget your password. 8. Enter your e-mail address. You will receive e-mail notification when new information is available in [address] 9. Click Sign Up. You can now view and download portions of your medical record. 10. Click the Download Summary menu link to download a portable copy of your medical information. If you have questions, please visit the Frequently Asked Questions section of the [employer] website. Remember, [employer] is NOT to be used for urgent needs. For medical emergencies, dial 911. Now available from your iPhone and Android! Please provide this summary of care documentation to your next provider. Your primary care clinician is listed as Devin Chance. If you have any questions after today's visit, please call [phone].

## 2018-07-19 RX ORDER — FUROSEMIDE 20 MG/1
20 TABLET ORAL DAILY
Qty: 90 TAB | Refills: 3 | Status: SHIPPED | OUTPATIENT
Start: 2018-07-19 | End: 2019-08-12 | Stop reason: SDUPTHER

## 2018-07-24 PROBLEM — N39.0 UTI (URINARY TRACT INFECTION): Status: RESOLVED | Noted: 2018-07-05 | Resolved: 2018-07-24

## 2018-07-24 PROBLEM — R06.02 SOB (SHORTNESS OF BREATH): Status: RESOLVED | Noted: 2018-06-14 | Resolved: 2018-07-24

## 2018-07-24 PROBLEM — I44.7 LBBB (LEFT BUNDLE BRANCH BLOCK): Status: ACTIVE | Noted: 2018-07-24

## 2018-07-24 PROBLEM — Z98.890 S/P CARDIAC CATH: Status: RESOLVED | Noted: 2018-07-05 | Resolved: 2018-07-24

## 2018-07-25 ENCOUNTER — OFFICE VISIT (OUTPATIENT)
Dept: INTERNAL MEDICINE CLINIC | Facility: CLINIC | Age: 83
End: 2018-07-25

## 2018-07-25 VITALS
WEIGHT: 160 LBS | SYSTOLIC BLOOD PRESSURE: 122 MMHG | HEIGHT: 64 IN | TEMPERATURE: 98.4 F | HEART RATE: 64 BPM | DIASTOLIC BLOOD PRESSURE: 78 MMHG | OXYGEN SATURATION: 97 % | RESPIRATION RATE: 20 BRPM | BODY MASS INDEX: 27.31 KG/M2

## 2018-07-25 DIAGNOSIS — B37.2 INTERTRIGINOUS CANDIDIASIS: ICD-10-CM

## 2018-07-25 DIAGNOSIS — I10 HYPERTENSION, ESSENTIAL: Primary | ICD-10-CM

## 2018-07-25 RX ORDER — CHLORPHENIRAMINE MALEATE 4 MG
TABLET ORAL
Qty: 15 G | Refills: 0
Start: 2018-07-25 | End: 2019-01-16

## 2018-07-25 NOTE — MR AVS SNAPSHOT
[address] [address] [phone] Patient: Kylah Campos MRN: [id number] SFW:[id number]/80/[id number] Visit Information Date & Time Provider Department Dept. Phone Encounter #  
 7/25/2018  9:15 AM Juanpablo Haines MD [facility] [place] [phone] Follow-up Instructions Return in about 4 months (around 11/25/2018) for HTN, chol, CAD . Your Appointments 1/16/2019 11:00 AM  
ESTABLISHED PATIENT with Chio Kuhn MD  
[facility] Appt Note: 6 month f/u  
 [address]  
[phone] [address] Upcoming Health Maintenance Date Due  
 GLAUCOMA SCREENING Q2Y 3/1/2018 Influenza Age 5 to Adult 8/1/2018 MEDICARE YEARLY EXAM 4/17/2019 DTaP/Tdap/Td series (2 - Td) 7/16/2023 Allergies as of 7/25/2018  Review Complete On: 7/25/2018 By: Juanpablo Haines MD  
  
 Severity Noted Reaction Type Reactions Contrast Agent [Iodine] High 03/01/2010    Hives Penicillins High 03/01/2010    Hives Bystolic [Nebivolol]  73/86/1183    Other (comments)  
 abd pain  
 Cardizem [Diltiazem Hcl]  03/01/2010    Rash Cardura [Doxazosin]  03/01/2010    Unknown (comments) Codeine  03/01/2010    Nausea Only Cymbalta [Duloxetine]  01/19/2017    Other (comments) Abdominal pain, anxiety Gabapentin  03/02/2017    Other (comments) Made her feel crazy Prinivil [Lisinopril]  03/01/2010    Unknown (comments) Tekturna [Aliskiren]  03/01/2010    Other (comments)  
 abd cramps Tenormin [Atenolol]  03/01/2010    Unknown (comments) Current Immunizations  Reviewed on 7/25/2018 Name Date Influenza High Dose Vaccine PF 12/12/2016, 10/26/2015, 10/3/2014 Influenza Vaccine 9/17/2013 Influenza Vaccine William Push) 11/24/2017 Pneumococcal Conjugate (PCV-13) 4/3/2015 TD Vaccine 6/1/2007 Tdap 7/16/2013 ZZZ-RETIRED (DO NOT USE) Pneumococcal Vaccine (Unspecified Type) 10/1/2002 Zoster 6/30/2009 Reviewed by Megan Toth LPN on 0/47/8165 at  9:07 AM  
You Were Diagnosed With   
  
 Codes Comments Hypertension, essential    -  Primary ICD-10-CM: I10 
ICD-9-CM: 401.9 Intertriginous candidiasis     ICD-10-CM: B37.2 ICD-9-CM: 112.3 Vitals BP Pulse Temp Resp Height(growth percentile) Weight(growth percentile) 122/78 (BP 1 Location: Right arm, BP Patient Position: Sitting) 64 98.4 °F (36.9 °C) (Oral) 20 5' 4\" (1.626 m) 160 lb (72.6 kg) SpO2 BMI OB Status Smoking Status 97% 27.46 kg/m2 Postmenopausal Never Smoker Vitals History BMI and BSA Data Body Mass Index Body Surface Area  
 27.46 kg/m 2 1.81 m 2 Preferred Pharmacy Pharmacy Name Phone [facility] [address] [place] [address] [phone] Your Updated Medication List  
  
   
This list is accurate as of 7/25/18  9:42 AM.  Always use your most recent med list. amLODIPine 5 mg tablet Commonly known as:  Mireya Blade Take 1 Tab by mouth daily. aspirin 81 mg chewable tablet Take 1 Tab by mouth daily. atorvastatin 20 mg tablet Commonly known as:  LIPITOR  
TAKE ONE TABLET BY MOUTH ONCE DAILY  
  
 cholecalciferol 1,000 unit Cap Commonly known as:  VITAMIN D3 Take  by mouth daily. cloNIDine HCl 0.1 mg tablet Commonly known as:  CATAPRES Take 1 Tab by mouth two (2) times a day. Indications: hypertension  
  
 clopidogrel 75 mg Tab Commonly known as:  PLAVIX Take 1 Tab by mouth daily. clotrimazole 1 % topical cream  
Commonly known as:  Melonie Genera Apply twice a day and rub into skin for 3 weeks. furosemide 20 mg tablet Commonly known as:  LASIX Take 1 Tab by mouth daily. Reduced 7/17/2018  
  
 losartan 100 mg tablet Commonly known as:  COZAAR  
TAKE ONE TABLET BY MOUTH ONCE DAILY  
  
 magnesium oxide 400 mg tablet Commonly known as:  MAG-OX Take 1 Tab by mouth daily. metoprolol succinate 25 mg XL tablet Commonly known as:  TOPROL-XL Take 0.5 Tabs by mouth daily. MIRALAX 17 gram/dose powder Generic drug:  polyethylene glycol Take 17 g by mouth daily. raNITIdine 150 mg tablet Commonly known as:  ZANTAC Take 150 mg by mouth as needed. TYLENOL ARTHRITIS PAIN 650 mg Syed Loose Generic drug:  acetaminophen Take 650 mg by mouth every eight (8) hours. Follow-up Instructions Return in about 4 months (around 11/25/2018) for HTN, chol, CAD . To-Do List   
 09/04/2018 1:00 PM  
  Appointment with [facility] at [facility] ([phone]) Patient Instructions Use Lotrimin AF cream (in the area where athletes foot medications are found) twice a day for 3 weeks on rash under left arm Office visit in 4 months Yeast Skin Infection: Care Instructions Your Care Instructions Yeast normally lives on your skin. Sometimes too much yeast can overgrow in certain areas of the skin and cause an infection. The infection causes red, scaly, moist patches on your skin that may itch. Common areas for skin yeast infections are skin folds under the breasts or belly area. The warm and moist areas in the skin folds can make it easier for yeast to overgrow. Yeast infections also can be found on other parts of the body such as the groin or armpits. You will probably get a cream or ointment that contains an antifungal medicine. Examples of these are miconazole and clotrimazole. You put it on your skin to treat the infection. Your doctor may give you a prescription for the cream or ointment. Or you may be able to buy it without a prescription at most drugstores. If the infection is severe, the doctor will prescribe antifungal pills. A yeast infection usually goes away after about a week of treatment. But it's important to use the medicine for as long as your doctor tells you to. Follow-up care is a key part of your treatment and safety. Be sure to make and go to all appointments, and call your doctor if you are having problems. It's also a good idea to know your test results and keep a list of the medicines you take. How can you care for yourself at home? · Be safe with medicines. Take your medicines exactly as prescribed. Call your doctor if you think you are having a problem with your medicine. · Keep your skin clean and dry. Your doctor may suggest using powder that contains an antifungal medicine in the skin folds. · Wear loose clothing. When should you call for help? Call your doctor now or seek immediate medical care if: 
  · You have symptoms of infection, such as: 
¨ Increased pain, swelling, warmth, or redness. ¨ Red streaks leading from the area. ¨ Pus draining from the area. ¨ A fever.  
 Watch closely for changes in your health, and be sure to contact your doctor if: 
  · You do not get better as expected. Where can you learn more? Go to http://hua-skip.info/. Enter B085 in the search box to learn more about \"Yeast Skin Infection: Care Instructions. \" Current as of: October 5, 2017 Content Version: 11.7 © 1574-5595 Access Point. Care instructions adapted under license by [employer] (which disclaims liability or warranty for this information). If you have questions about a medical condition or this instruction, always ask your healthcare professional. [address] [address] any warranty or liability for your use of this information. Introducing [facility]! Abhishek Bonner introduces FanIQ patient portal. Now you can access parts of your medical record, email your doctor's office, and request medication refills online. 1. In your internet browser, go to [email or web address] 2. Click on the First Time User? Click Here link in the Sign In box. You will see the New Member Sign Up page. 3. Enter your Atilekt Access Code exactly as it appears below. You will not need to use this code after youve completed the sign-up process. If you do not sign up before the expiration date, you must request a new code. · Atilekt Access Code: [email or web address] Expires: 10/15/2018  1:26 PM 
 
4. Enter the last four digits of your Social Security Number (xxxx) and Date of Birth (mm/dd/yyyy) as indicated and click Submit. You will be taken to the next sign-up page. 5. Create a NatSentt ID. This will be your Atilekt login ID and cannot be changed, so think of one that is secure and easy to remember. 6. Create a Atilekt password. You can change your password at any time. 7. Enter your Password Reset Question and Answer. This can be used at a later time if you forget your password. 8. Enter your e-mail address. You will receive e-mail notification when new information is available in [address] 9. Click Sign Up. You can now view and download portions of your medical record. 10. Click the Download Summary menu link to download a portable copy of your medical information. If you have questions, please visit the Frequently Asked Questions section of the Atilekt website. Remember, Atilekt is NOT to be used for urgent needs. For medical emergencies, dial 911. Now available from your iPhone and Android! Please provide this summary of care documentation to your next provider. Your primary care clinician is listed as Malina Novak. If you have any questions after today's visit, please call [phone].

## 2018-07-25 NOTE — PATIENT INSTRUCTIONS
Use Lotrimin AF cream (in the area where athletes foot medications are found) twice a day for 3 weeks on rash under left arm  Office visit in 4 months            Yeast Skin Infection: Care Instructions  Your Care Instructions    Yeast normally lives on your skin. Sometimes too much yeast can overgrow in certain areas of the skin and cause an infection. The infection causes red, scaly, moist patches on your skin that may itch. Common areas for skin yeast infections are skin folds under the breasts or belly area. The warm and moist areas in the skin folds can make it easier for yeast to overgrow. Yeast infections also can be found on other parts of the body such as the groin or armpits. You will probably get a cream or ointment that contains an antifungal medicine. Examples of these are miconazole and clotrimazole. You put it on your skin to treat the infection. Your doctor may give you a prescription for the cream or ointment. Or you may be able to buy it without a prescription at most drugstores. If the infection is severe, the doctor will prescribe antifungal pills. A yeast infection usually goes away after about a week of treatment. But it's important to use the medicine for as long as your doctor tells you to. Follow-up care is a key part of your treatment and safety. Be sure to make and go to all appointments, and call your doctor if you are having problems. It's also a good idea to know your test results and keep a list of the medicines you take. How can you care for yourself at home? · Be safe with medicines. Take your medicines exactly as prescribed. Call your doctor if you think you are having a problem with your medicine. · Keep your skin clean and dry. Your doctor may suggest using powder that contains an antifungal medicine in the skin folds. · Wear loose clothing. When should you call for help?   Call your doctor now or seek immediate medical care if:    · You have symptoms of infection, such as:  ¨ Increased pain, swelling, warmth, or redness. ¨ Red streaks leading from the area. ¨ Pus draining from the area. ¨ A fever.    Watch closely for changes in your health, and be sure to contact your doctor if:    · You do not get better as expected. Where can you learn more? Go to http://hua-skip.info/. Enter F608 in the search box to learn more about \"Yeast Skin Infection: Care Instructions. \"  Current as of: October 5, 2017  Content Version: 11.7  © 1682-7703 [employer]. Care instructions adapted under license by [employer] (which disclaims liability or warranty for this information). If you have questions about a medical condition or this instruction, always ask your healthcare professional. [address] any warranty or liability for your use of this information.

## 2018-07-25 NOTE — PROGRESS NOTES
HISTORY OF PRESENT ILLNESS  Madalyn Rodriguez is a 80 y.o. female. HPI  She presents for follow up of hypertension. She also has hyperlipidemia and coronary artery disease receiving 2 stents (LAD and RCA) July 5. She has a LBBB. On 6/4 blood pressure was 132/74 and clonidine was increased from once to twice a day. Blood pressure at cardiology visit on July 17was 140/80  Diet and Lifestyle: generally follows a low fat low cholesterol diet, generally follows a low sodium diet, exercises regularly, nonsmoker  Medication compliance: compliant all of the time  Medication side effects: none  Home BP Monitoring: not checking right now. Cardiovascular ROS:  She complains of lower extremity edema. She denies exertional chest pressure/discomfort, dyspnea on exertion     Rash under left arm for about 10 days. Does not itch. She sleeps on left side.      Patient Active Problem List   Diagnosis Code    Hypertension, essential I10    Plantar fasciitis M72.2    Spinal stenosis M48.00    Cervical neck pain with evidence of disc disease M50.90    GERD (gastroesophageal reflux disease) K21.9    Rotator cuff rupture M75.100    Tubular adenoma of colon D12.6    Advance directive on file Z78.9    Osteopenia with high risk of fracture M85.80    Hypercholesterolemia E78.00    Coronary artery disease involving native coronary artery of native heart with angina pectoris with documented spasm ([facility] [address] I25.111    LBBB (left bundle branch block) I44.7     Past Medical History:   Diagnosis Date    GERD (gastroesophageal reflux disease)     Hypercholesterolemia     Hypertension     SOB (shortness of breath) 6/14/2018    UTI (urinary tract infection) 7/5/2018     Past Surgical History:   Procedure Laterality Date    ABDOMEN SURGERY PROC UNLISTED  2005    Laproscopic colon polyp excision Dr. Stringer Fail HX APPENDECTOMY      Age 25    HX HEENT Bilateral     cataracts    HX ORTHOPAEDIC  1996    Lumbar laminectomy    ID COLSC FLX W/RMVL OF TUMOR POLYP LESION SNARE TQ  7/18/2014          Social History     Social History    Marital status:      Spouse name: N/A    Number of children: N/A    Years of education: N/A     Social History Main Topics    Smoking status: Never Smoker    Smokeless tobacco: Never Used    Alcohol use No    Drug use: None    Sexual activity: Not Asked     Other Topics Concern    None     Social History Narrative     Family History   Problem Relation Age of Onset    Heart Disease Mother     Stroke Mother     Cancer Father     Cancer Brother     Heart Disease Brother     Cancer Brother      Allergies   Allergen Reactions    Contrast Agent [Iodine] Hives    Penicillins Hives    Bystolic [Nebivolol] Other (comments)     abd pain    Cardizem [Diltiazem Hcl] Rash    Cardura [Doxazosin] Unknown (comments)    Codeine Nausea Only    Cymbalta [Duloxetine] Other (comments)     Abdominal pain, anxiety    Gabapentin Other (comments)     Made her feel crazy    Prinivil [Lisinopril] Unknown (comments)    Tekturna [Aliskiren] Other (comments)     abd cramps    Tenormin [Atenolol] Unknown (comments)     Current Outpatient Prescriptions   Medication Sig Dispense Refill    furosemide (LASIX) 20 mg tablet Take 1 Tab by mouth daily. Reduced 7/17/2018 90 Tab 3    clopidogrel (PLAVIX) 75 mg tab Take 1 Tab by mouth daily. 30 Tab 11    raNITIdine (ZANTAC) 150 mg tablet Take 150 mg by mouth as needed.  aspirin 81 mg chewable tablet Take 1 Tab by mouth daily. 30 Tab 0    metoprolol succinate (TOPROL-XL) 25 mg XL tablet Take 0.5 Tabs by mouth daily. 16 Tab 3    acetaminophen (TYLENOL ARTHRITIS PAIN) 650 mg TbER Take 650 mg by mouth every eight (8) hours.  cloNIDine HCl (CATAPRES) 0.1 mg tablet Take 1 Tab by mouth two (2) times a day. Indications: hypertension 180 Tab 3    amLODIPine (NORVASC) 5 mg tablet Take 1 Tab by mouth daily.  90 Tab 3    atorvastatin (LIPITOR) 20 mg tablet TAKE ONE TABLET BY MOUTH ONCE DAILY 90 Tab 3    losartan (COZAAR) 100 mg tablet TAKE ONE TABLET BY MOUTH ONCE DAILY 90 Tab 3    cholecalciferol (VITAMIN D3) 1,000 unit cap Take  by mouth daily.  magnesium oxide (MAG-OX) 400 mg tablet Take 1 Tab by mouth daily. 30 Tab 12    polyethylene glycol (MIRALAX) 17 gram/dose powder Take 17 g by mouth daily. ROS  Visit Vitals    /78 (BP 1 Location: Right arm, BP Patient Position: Sitting)    Pulse 64    Temp 98.4 °F (36.9 °C) (Oral)    Resp 20    Ht 5' 4\" (1.626 m)    Wt 160 lb (72.6 kg)    SpO2 97%    BMI 27.46 kg/m2     Physical Exam   Constitutional: She is oriented to person, place, and time. She appears well-developed and well-nourished. HENT:   Head: Normocephalic and atraumatic. Eyes: Conjunctivae are normal. Pupils are equal, round, and reactive to light. Neck: Neck supple. Carotid bruit is not present. No thyromegaly present. Cardiovascular: Normal rate, regular rhythm, S1 normal, S2 normal and normal heart sounds. Exam reveals no gallop. No murmur heard. Pulmonary/Chest: Effort normal and breath sounds normal. She has no wheezes. She has no rhonchi. She has no rales. Lymphadenopathy:     She has no cervical adenopathy. Neurological: She is alert and oriented to person, place, and time. Skin: Skin is warm, dry and intact. Rash noted. Psychiatric: She has a normal mood and affect. Her behavior is normal.   Nursing note and vitals reviewed.     Lab Results   Component Value Date/Time    Cholesterol, total 148 07/19/2017 12:00 AM    HDL Cholesterol 43 07/19/2017 12:00 AM    LDL, calculated 82 07/19/2017 12:00 AM    VLDL, calculated 23 07/19/2017 12:00 AM    Triglyceride 115 07/19/2017 12:00 AM     Lab Results   Component Value Date/Time    Sodium 139 06/14/2018 10:10 AM    Potassium 3.4 (L) 06/14/2018 10:10 AM    Chloride 104 06/14/2018 10:10 AM    CO2 28 06/14/2018 10:10 AM    Anion gap 7 06/14/2018 10:10 AM    Glucose 108 (H) 06/14/2018 10:10 AM    BUN 17 06/14/2018 10:10 AM    Creatinine 1.07 (H) 06/14/2018 10:10 AM    BUN/Creatinine ratio 16 06/14/2018 10:10 AM    GFR est AA 59 (L) 06/14/2018 10:10 AM    GFR est non-AA 49 (L) 06/14/2018 10:10 AM    Calcium 9.9 06/14/2018 10:10 AM       ASSESSMENT and PLAN    ICD-10-CM ICD-9-CM    1. Hypertension, essential I10 401.9    2. Intertriginous candidiasis B37.2 112.3 clotrimazole (LOTRIMIN) 1 % topical cream     Diagnoses and all orders for this visit:    1. Hypertension, essential  Hypertension is controlled    2. Intertriginous candidiasis  -     clotrimazole (LOTRIMIN) 1 % topical cream; Apply twice a day and rub into skin for 3 weeks. Follow-up Disposition:  Return in about 4 months (around 11/25/2018) for HTN, chol, CAD .  lab results and schedule of future lab studies reviewed with patient  reviewed diet, exercise and weight control  I have discussed the diagnosis, evaluation and treatment options and the intended plan with the patient. Patient understands and is in agreement. The patient has received an after-visit summary and questions were answered concerning future plans.   I have discussed side effects and warnings of any new medications with the patient as well.    cardiovascular risk and specific lipid/LDL goals reviewed

## 2018-07-25 NOTE — PROGRESS NOTES
Mahendra Franklin  Identified pt with two pt identifiers(name and ). Chief Complaint   Patient presents with    Blood Pressure Check       Reviewed record In preparation for visit and have obtained necessary documentation. Advanced directive / living will on file. 1. Have you been to the ER, urgent care clinic or hospitalized since your last visit? No    2. Have you seen or consulted any other health care providers outside of the [address] [place] [address] since your last visit? Include any pap smears or colon screening. Arlyne Laser NP, catherization     Vitals reviewed with provider.     Health Maintenance reviewed:     Health Maintenance Due   Topic    GLAUCOMA SCREENING Q2Y           Wt Readings from Last 3 Encounters:   18 160 lb (72.6 kg)   18 159 lb 6.4 oz (72.3 kg)   18 161 lb (73 kg)        Temp Readings from Last 3 Encounters:   18 98.4 °F (36.9 °C) (Oral)   18 97.1 °F (36.2 °C)   18 98.5 °F (36.9 °C) (Oral)        BP Readings from Last 3 Encounters:   18 151/79   18 132/74   18 165/72        Pulse Readings from Last 3 Encounters:   18 64   18 (!) 49   18 (!) 56        Vitals:    18 0912   BP: 151/79   Pulse: 64   Resp: 20   Temp: 98.4 °F (36.9 °C)   TempSrc: Oral   SpO2: 97%   Weight: 160 lb (72.6 kg)   Height: 5' 4\" (1.626 m)   PainSc:   0 - No pain          Learning Assessment:   :       Learning Assessment 2014   PRIMARY LEARNER Patient   HIGHEST LEVEL OF EDUCATION - PRIMARY LEARNER  GRADUATED HIGH SCHOOL OR GED   BARRIERS PRIMARY LEARNER NONE   CO-LEARNER CAREGIVER No   PRIMARY LANGUAGE ENGLISH   LEARNER PREFERENCE PRIMARY DEMONSTRATION   ANSWERED BY patient   RELATIONSHIP SELF        Depression Screening:   :       PHQ over the last two weeks 2018   Little interest or pleasure in doing things Not at all   Feeling down, depressed, irritable, or hopeless Not at all   Total Score PHQ 2 0        Fall Risk Assessment:   :       Fall Risk Assessment, last 12 mths 4/16/2018   Able to walk? Yes   Fall in past 12 months? No        Abuse Screening:   :       Abuse Screening Questionnaire 4/16/2018 1/19/2017 10/26/2015 8/5/2014   Do you ever feel afraid of your partner? N N N N   Are you in a relationship with someone who physically or mentally threatens you? N N N N   Is it safe for you to go home?  Y Y Y Y        ADL Screening:   :       ADL Assessment 4/3/2015   Feeding yourself No Help Needed   Getting from bed to chair No Help Needed   Getting dressed No Help Needed   Bathing or showering No Help Needed   Walk across the room (includes cane/walker) No Help Needed   Using the telphone No Help Needed   Taking your medications No Help Needed   Preparing meals No Help Needed   Managing money (expenses/bills) No Help Needed   Moderately strenuous housework (laundry) No Help Needed   Shopping for personal items (toiletries/medicines) No Help Needed   Shopping for groceries No Help Needed   Driving No Help Needed   Climbing a flight of stairs No Help Needed   Getting to places beyond walking distances No Help Needed

## 2018-07-26 DIAGNOSIS — I10 HYPERTENSION, ESSENTIAL: ICD-10-CM

## 2018-07-26 NOTE — TELEPHONE ENCOUNTER
Pt called and stated that she went to  the following medication from [facility] and the dosage said take two pills a day but the pt stated she only takes one pill. She stated the pharmacy should be faxing over a correction. She wanted to find out should it be two pills a day or one. amLODIPine (NORVASC) 5 mg tablet   5 mg, DAILY 3 ordered  Reorder     Summary: Take 1 Tab by mouth daily. , No Print  Please consider 90 day supplies to promote better adherence  Disp-90 Tab, R-3

## 2018-07-26 NOTE — TELEPHONE ENCOUNTER
[facility] advised that RX for amlodipine should be one tablet daily, will send a new RX for their records. Pt also notified. PCP: Jenny Beltran MD     Last appt: 7/25/2018   Future Appointments  Date Time Provider Eula Kiddi   9/4/2018 1:00 PM [facility] [address]   11/19/2018 10:30 AM Jenny Beltran  [facility]   1/16/2019 11:00 AM Cee Terry MD [address] [facility]        Requested Prescriptions     Pending Prescriptions Disp Refills    amLODIPine (NORVASC) 5 mg tablet 90 Tab 3     Sig: Take 1 Tab by mouth daily.

## 2018-07-27 RX ORDER — AMLODIPINE BESYLATE 5 MG/1
5 TABLET ORAL DAILY
Qty: 90 TAB | Refills: 3 | Status: SHIPPED | OUTPATIENT
Start: 2018-07-27 | End: 2019-08-12 | Stop reason: SDUPTHER

## 2018-08-10 ENCOUNTER — TELEPHONE (OUTPATIENT)
Dept: CARDIOLOGY CLINIC | Age: 83
End: 2018-08-10

## 2018-08-10 NOTE — TELEPHONE ENCOUNTER
Verified patient with two identifiers. Spoke with pt, she has been feeling lethargic lately, she will lay down and rest for a while, then she feels better, this started after she had her stent. Her pulse is 53, which would make her tired and lethargic. Advised her to listen to her body and take breaks as she needs, if it gets worse as in sleeping all day to call back. Pt verbalized understanding.

## 2018-08-10 NOTE — TELEPHONE ENCOUNTER
Pt's daughter states that mother had stents placed about a month ago and patient has since been very lethargic. She checked patient's blood pressure which was \"ok\" but the patient's heart rate was 53 when she last checked. Please call the patient to discuss further.     Thanks,    [employer]

## 2018-09-04 ENCOUNTER — HOSPITAL ENCOUNTER (OUTPATIENT)
Dept: CARDIAC REHAB | Age: 83
Discharge: HOME | End: 2018-09-04
Payer: MEDICARE

## 2018-09-04 VITALS — WEIGHT: 160.13 LBS | HEIGHT: 64 IN | BODY MASS INDEX: 27.34 KG/M2

## 2018-09-04 DIAGNOSIS — Z95.5 STENTED CORONARY ARTERY: ICD-10-CM

## 2018-09-04 PROCEDURE — 93798 PHYS/QHP OP CAR RHAB W/ECG: CPT

## 2018-09-04 NOTE — CARDIO/PULMONARY
Cardiopulmonary Rehab Intake Note:  Met with Ms. Mancia Keisha for the initial session. Ms. Ravin Connelly is an 80year old patient of Dr. Cherelle Castaneda  who presents to rehab for cardiac strengthening and conditioning, S/P PCI/JAMISON of LAD and RCA on 7/5/18. LVEF 60% on cath 7/5/18. PMH significant for HTN since age 37, GERD, cervical spinal stenosis, hypercholesterolemia and osteopenia.        Lungs were clear to auscultation bilaterally except for slight coarseness at left base. She denied cough. She had 1+ BLE edema.        Limitations to exercise are primarily related to recent PCI and neck issues and pain related to cervical spinal stenosis.     Pt walks on a treadmill for one mile and rides a stationary bike for two miles for exercise at a gym twice a week for about 45-60 minutes. She enjoys going to the gym with a neighbor.      Psychosocial: Pt scored 3 on PHQ9. She is retired. She was  years ago. She does admit to some anxiety but is not on medication. She has a very supportive daughter. She also has a son in the area and many grandchildren and great-grandchildren. She enjoys knitting and reading. Patient was given an education notebook was given. She declined to watch cardiac rehab video due to time constraints. Heart rhythm on the monitor was a SB-SR with a BBB. Oxygen saturation was 98% on room air. BMI 27.5. Patient's identified goals are  1. To continue walking/biking for exercise at the gym 3 x a week for 20-30 minutes. 2. To increase her HDL through diet, exercised and meds. 3. To learn deep breathing/relaxation technique for anxiety.

## 2018-09-10 ENCOUNTER — APPOINTMENT (OUTPATIENT)
Dept: CARDIAC REHAB | Age: 83
End: 2018-09-10
Attending: INTERNAL MEDICINE
Payer: MEDICARE

## 2018-09-13 ENCOUNTER — APPOINTMENT (OUTPATIENT)
Dept: CARDIAC REHAB | Age: 83
End: 2018-09-13
Attending: INTERNAL MEDICINE
Payer: MEDICARE

## 2018-09-14 ENCOUNTER — HOSPITAL ENCOUNTER (OUTPATIENT)
Dept: CARDIAC REHAB | Age: 83
Discharge: HOME | End: 2018-09-14
Attending: INTERNAL MEDICINE
Payer: MEDICARE

## 2018-09-14 ENCOUNTER — APPOINTMENT (OUTPATIENT)
Dept: CARDIAC REHAB | Age: 83
End: 2018-09-14
Attending: INTERNAL MEDICINE
Payer: MEDICARE

## 2018-09-14 NOTE — CARDIO/PULMONARY
Pt called in to cancel cardiac rehab session today as she has hurt her back. She will rest it this weekend and hopes to return on Monday. Appts adjusted in computer.

## 2018-09-19 ENCOUNTER — HOSPITAL ENCOUNTER (OUTPATIENT)
Dept: CARDIAC REHAB | Age: 83
Discharge: HOME | End: 2018-09-19
Attending: INTERNAL MEDICINE
Payer: MEDICARE

## 2018-09-19 ENCOUNTER — TELEPHONE (OUTPATIENT)
Dept: OTHER | Age: 83
End: 2018-09-19

## 2018-09-19 VITALS — WEIGHT: 159.8 LBS | BODY MASS INDEX: 27.43 KG/M2

## 2018-09-19 PROCEDURE — 93798 PHYS/QHP OP CAR RHAB W/ECG: CPT

## 2018-09-19 NOTE — TELEPHONE ENCOUNTER
Verified patient with two identifiers. Spoke with pt, she is scheduled to have a regular cleaning on teeth on 10/12, asking if she needs an abx, will discuss with Dr. Anaid Zeng and call pt back.

## 2018-09-19 NOTE — TELEPHONE ENCOUNTER
Please call patient she has some concerns about her upcoming dental visit.  She wants to know if she needs to be on an antibiotic prior to her visit  October 10/18 [phone] (office number Rosalba Garett)    Thanks

## 2018-09-21 NOTE — CARDIO/PULMONARY
Pt called to say she wanted to return to her gym to workout. States she has not worked out at her gym since Rachel 10-\"since my episode\". Said she felt her numbers were good and did not need to continue here. Encouraged to call us if she decides to return or has any questions. did not wish to do exit.

## 2018-09-24 ENCOUNTER — APPOINTMENT (OUTPATIENT)
Dept: CARDIAC REHAB | Age: 83
End: 2018-09-24
Attending: INTERNAL MEDICINE
Payer: MEDICARE

## 2018-09-25 NOTE — TELEPHONE ENCOUNTER
Verified patient with two identifiers. Spoke with pt advising her per Dr. Roxane Pearson she does not need an abx before dental cleaning. Pt verbalized understanding.

## 2018-09-27 ENCOUNTER — APPOINTMENT (OUTPATIENT)
Dept: CARDIAC REHAB | Age: 83
End: 2018-09-27
Attending: INTERNAL MEDICINE
Payer: MEDICARE

## 2018-10-01 ENCOUNTER — APPOINTMENT (OUTPATIENT)
Dept: CARDIAC REHAB | Age: 83
End: 2018-10-01
Attending: INTERNAL MEDICINE

## 2018-10-04 ENCOUNTER — APPOINTMENT (OUTPATIENT)
Dept: CARDIAC REHAB | Age: 83
End: 2018-10-04
Attending: INTERNAL MEDICINE

## 2018-10-29 RX ORDER — METOPROLOL SUCCINATE 25 MG/1
12.5 TABLET, EXTENDED RELEASE ORAL DAILY
Qty: 16 TAB | Refills: 12 | Status: SHIPPED | OUTPATIENT
Start: 2018-10-29 | End: 2018-11-19 | Stop reason: SDUPTHER

## 2018-10-31 RX ORDER — LOSARTAN POTASSIUM 100 MG/1
TABLET ORAL
Qty: 90 TAB | Refills: 3 | Status: SHIPPED | OUTPATIENT
Start: 2018-10-31 | End: 2019-11-08 | Stop reason: SDUPTHER

## 2018-11-01 ENCOUNTER — TELEPHONE (OUTPATIENT)
Dept: CARDIAC REHAB | Age: 83
End: 2018-11-01

## 2018-11-01 NOTE — TELEPHONE ENCOUNTER
Cardiopulmonary Rehab Nursing Entry:    Pt returned call to report that she will not be returning to [facility] due to membership at a local gym.      11-1-18 @ 11:37am left pt vm to check if she will return to program, awaiting pt response           Electronically signed by Estrella Sifuentes at 11/01/18 1452

## 2018-11-19 ENCOUNTER — OFFICE VISIT (OUTPATIENT)
Dept: INTERNAL MEDICINE CLINIC | Facility: CLINIC | Age: 83
End: 2018-11-19

## 2018-11-19 VITALS
DIASTOLIC BLOOD PRESSURE: 72 MMHG | WEIGHT: 163 LBS | TEMPERATURE: 97.6 F | RESPIRATION RATE: 14 BRPM | SYSTOLIC BLOOD PRESSURE: 148 MMHG | HEIGHT: 64 IN | OXYGEN SATURATION: 97 % | HEART RATE: 57 BPM | BODY MASS INDEX: 27.83 KG/M2

## 2018-11-19 DIAGNOSIS — I12.9 BENIGN HYPERTENSION WITH CHRONIC KIDNEY DISEASE, STAGE II: Primary | ICD-10-CM

## 2018-11-19 DIAGNOSIS — K21.9 GASTROESOPHAGEAL REFLUX DISEASE WITHOUT ESOPHAGITIS: ICD-10-CM

## 2018-11-19 DIAGNOSIS — Z23 ENCOUNTER FOR IMMUNIZATION: ICD-10-CM

## 2018-11-19 DIAGNOSIS — I25.111 CORONARY ARTERY DISEASE INVOLVING NATIVE CORONARY ARTERY OF NATIVE HEART WITH ANGINA PECTORIS WITH DOCUMENTED SPASM (HCC): ICD-10-CM

## 2018-11-19 DIAGNOSIS — N18.2 BENIGN HYPERTENSION WITH CHRONIC KIDNEY DISEASE, STAGE II: Primary | ICD-10-CM

## 2018-11-19 DIAGNOSIS — E78.00 HYPERCHOLESTEROLEMIA: ICD-10-CM

## 2018-11-19 DIAGNOSIS — Z78.0 POSTMENOPAUSAL ESTROGEN DEFICIENCY: ICD-10-CM

## 2018-11-19 RX ORDER — METOPROLOL SUCCINATE 25 MG/1
12.5 TABLET, EXTENDED RELEASE ORAL DAILY
Qty: 45 TAB | Refills: 3 | Status: SHIPPED | OUTPATIENT
Start: 2018-11-19 | End: 2019-11-08 | Stop reason: SDUPTHER

## 2018-11-19 RX ORDER — CLOPIDOGREL BISULFATE 75 MG/1
75 TABLET ORAL DAILY
Qty: 90 TAB | Refills: 2 | Status: SHIPPED | OUTPATIENT
Start: 2018-11-19 | End: 2019-05-15

## 2018-11-19 NOTE — PROGRESS NOTES
HISTORY OF PRESENT ILLNESS  Alysia Perry is a 80 y.o. female. HPI  She presents for follow up of hypertension, hyperlipidemia, coronary artery disease and status post coronary artery stenting. Diet and Lifestyle: generally follows a low fat low cholesterol diet, generally follows a low sodium diet, exercises regularly, nonsmoker  Medication compliance: compliant all of the time  Medication side effects: none  Home BP Monitorin-130/63-80  Cardiovascular ROS:  She denies palpitations, orthopnea, exertional chest pressure/discomfort, claudication, lower extremity edema, dyspnea on exertion, dizziness     She presents for follow up of gastroesophageal reflux. She is currently treating this with ranitidine. . Current symptoms include none. She denies dysphagia, has not lost weight, denies heartburn.       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, N18.2    Plantar fasciitis M72.2    Spinal stenosis M48.00    Cervical neck pain with evidence of disc disease M50.90    GERD (gastroesophageal reflux disease) K21.9    Rotator cuff rupture M75.100    Tubular adenoma of colon D12.6    Advance directive on file Z78.9    Osteopenia with high risk of fracture M85.80    Hypercholesterolemia E78.00    Coronary artery disease involving native coronary artery of native heart with angina pectoris with documented spasm ([facility] [address] I25.111    LBBB (left bundle branch block) I44.7     Past Medical History:   Diagnosis Date    CAD (coronary artery disease)     GERD (gastroesophageal reflux disease)     Hypercholesterolemia     Hypertension     SOB (shortness of breath) 2018    UTI (urinary tract infection) 2018     Past Surgical History:   Procedure Laterality Date    ABDOMEN SURGERY PROC UNLISTED      Laproscopic colon polyp excision Dr. Darryl Hurtado  2018    PCI/JAMISON to LAD and RCA    HX APPENDECTOMY      Age [address]    HX HEENT Bilateral     cataracts    HX ORTHOPAEDIC      Lumbar laminectomy    CA COLSC FLX W/RMVL OF TUMOR POLYP LESION SNARE TQ  2014          Social History     Socioeconomic History    Marital status:      Spouse name: Not on file    Number of children: Not on file    Years of education: Not on file    Highest education level: Not on file   Social Needs    Financial resource strain: Not on file    Food insecurity - worry: Not on file    Food insecurity - inability: Not on file   Sold needs - medical: Not on file   Sold needs - non-medical: Not on file   Occupational History    Not on file   Tobacco Use    Smoking status: Former Smoker     Last attempt to quit: 1982     Years since quittin.6    Smokeless tobacco: Never Used   Substance and Sexual Activity    Alcohol use: No    Drug use: No    Sexual activity: Not on file   Other Topics Concern    Not on file   Social History Narrative    Not on file     Family History   Problem Relation Age of Onset    Heart Disease Mother     Stroke Mother     Cancer Father     Cancer Brother     Heart Disease Brother     Cancer Brother      Allergies   Allergen Reactions    Contrast Agent [Iodine] Hives    Penicillins Hives    Bystolic [Nebivolol] Other (comments)     abd pain    Cardizem [Diltiazem Hcl] Rash    Cardura [Doxazosin] Unknown (comments)    Codeine Nausea Only    Cymbalta [Duloxetine] Other (comments)     Abdominal pain, anxiety    Gabapentin Other (comments)     Made her feel crazy    Prinivil [Lisinopril] Unknown (comments)    Tekturna [Aliskiren] Other (comments)     abd cramps    Tenormin [Atenolol] Unknown (comments)     Current Outpatient Medications   Medication Sig Dispense Refill    metoprolol succinate (TOPROL-XL) 25 mg XL tablet Take 0.5 Tabs by mouth daily. 45 Tab 3    clopidogrel (PLAVIX) 75 mg tab Take 1 Tab by mouth daily.  90 Tab 2    losartan (COZAAR) 100 mg tablet TAKE ONE TABLET BY MOUTH ONCE DAILY 90 Tab 3    amLODIPine (NORVASC) 5 mg tablet Take 1 Tab by mouth daily. 90 Tab 3    clotrimazole (LOTRIMIN) 1 % topical cream Apply twice a day and rub into skin for 3 weeks. 15 g 0    furosemide (LASIX) 20 mg tablet Take 1 Tab by mouth daily. Reduced 7/17/2018 90 Tab 3    raNITIdine (ZANTAC) 150 mg tablet Take 150 mg by mouth as needed.  aspirin 81 mg chewable tablet Take 1 Tab by mouth daily. 30 Tab 0    acetaminophen (TYLENOL ARTHRITIS PAIN) 650 mg TbER Take 650 mg by mouth every eight (8) hours.  cloNIDine HCl (CATAPRES) 0.1 mg tablet Take 1 Tab by mouth two (2) times a day. Indications: hypertension 180 Tab 3    atorvastatin (LIPITOR) 20 mg tablet TAKE ONE TABLET BY MOUTH ONCE DAILY 90 Tab 3    cholecalciferol (VITAMIN D3) 1,000 unit cap Take  by mouth daily.  magnesium oxide (MAG-OX) 400 mg tablet Take 1 Tab by mouth daily. 30 Tab 12    polyethylene glycol (MIRALAX) 17 gram/dose powder Take 17 g by mouth daily. Review of Systems   Constitutional: Negative for malaise/fatigue and weight loss. Gastrointestinal: Negative for constipation and diarrhea. Musculoskeletal: Positive for back pain, joint pain and neck pain. Neurological: Negative for tingling and focal weakness. Visit Vitals  /72 (BP 1 Location: Right arm, BP Patient Position: Sitting)   Pulse (!) 57   Temp 97.6 °F (36.4 °C) (Oral)   Resp 14   Ht 5' 4\" (1.626 m)   Wt 163 lb (73.9 kg)   SpO2 97%   BMI 27.98 kg/m²     Physical Exam   Constitutional: She is oriented to person, place, and time. She appears well-developed and well-nourished. HENT:   Head: Normocephalic and atraumatic. Eyes: Conjunctivae are normal. Pupils are equal, round, and reactive to light. Neck: Neck supple. Carotid bruit is not present. No thyromegaly present. Cardiovascular: Normal rate, regular rhythm and normal heart sounds. PMI is not displaced.  Exam reveals no gallop. No murmur heard. Pulses:       Dorsalis pedis pulses are 2+ on the right side, and 2+ on the left side. Posterior tibial pulses are 2+ on the right side, and 2+ on the left side. Pulmonary/Chest: Effort normal. She has no wheezes. She has no rhonchi. She has no rales. Abdominal: Soft. Normal appearance. She exhibits no abdominal bruit and no mass. There is no hepatosplenomegaly. There is no tenderness. Musculoskeletal: She exhibits no edema. Lymphadenopathy:     She has no cervical adenopathy. Right: No supraclavicular adenopathy present. Left: No supraclavicular adenopathy present. Neurological: She is alert and oriented to person, place, and time. No sensory deficit. Skin: Skin is warm, dry and intact. No rash noted. Psychiatric: She has a normal mood and affect. Her behavior is normal.   Nursing note and vitals reviewed. Lab Results   Component Value Date/Time    Cholesterol, total 148 07/19/2017 12:00 AM    HDL Cholesterol 43 07/19/2017 12:00 AM    LDL, calculated 82 07/19/2017 12:00 AM    VLDL, calculated 23 07/19/2017 12:00 AM    Triglyceride 115 07/19/2017 12:00 AM     Lab Results   Component Value Date/Time    Sodium 139 06/14/2018 10:10 AM    Potassium 3.4 (L) 06/14/2018 10:10 AM    Chloride 104 06/14/2018 10:10 AM    CO2 28 06/14/2018 10:10 AM    Anion gap 7 06/14/2018 10:10 AM    Glucose 108 (H) 06/14/2018 10:10 AM    BUN 17 06/14/2018 10:10 AM    Creatinine 1.07 (H) 06/14/2018 10:10 AM    BUN/Creatinine ratio 16 06/14/2018 10:10 AM    GFR est AA 59 (L) 06/14/2018 10:10 AM    GFR est non-AA 49 (L) 06/14/2018 10:10 AM    Calcium 9.9 06/14/2018 10:10 AM    Bilirubin, total 0.5 06/12/2018 11:50 AM    AST (SGOT) 28 06/12/2018 11:50 AM    Alk.  phosphatase 100 06/12/2018 11:50 AM    Protein, total 7.8 06/12/2018 11:50 AM    Albumin 4.1 06/12/2018 11:50 AM    Globulin 3.7 06/12/2018 11:50 AM    A-G Ratio 1.1 06/12/2018 11:50 AM    ALT (SGPT) 30 06/12/2018 11:50 AM       ASSESSMENT and PLAN    ICD-10-CM ICD-9-CM    1. Benign hypertension with chronic kidney disease, 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 403.10     N18.2 585.2    2. Hypercholesterolemia E78.00 272.0    3. Coronary artery disease involving native coronary artery of native heart with angina pectoris with documented spasm (HCC) I25.111 414.01 metoprolol succinate (TOPROL-XL) 25 mg XL tablet     413.9 clopidogrel (PLAVIX) 75 mg tab   4. Gastroesophageal reflux disease without esophagitis K21.9 530.81    5. Postmenopausal estrogen deficiency Z78.0 V49.81 DEXA BONE DENSITY STUDY AXIAL   6. Encounter for immunization Z23 V03.89 INFLUENZA VACCINE INACTIVATED (IIV), SUBUNIT, ADJUVANTED, IM      ADMIN INFLUENZA VIRUS VAC     Diagnoses and all orders for this visit:    1. Benign hypertension with chronic kidney disease, stage II  Blood pressure is at goal and creatinine is stable. 2. Hypercholesterolemia  Hyperlipidemia is controlled    3. Coronary artery disease involving native coronary artery of native heart with angina pectoris with documented spasm (HCC)  Stable taking aspirin and statin. -     metoprolol succinate (TOPROL-XL) 25 mg XL tablet; Take 0.5 Tabs by mouth daily. -     clopidogrel (PLAVIX) 75 mg tab; Take 1 Tab by mouth daily. 4. Gastroesophageal reflux disease without esophagitis  Symptoms controlled. 5. Postmenopausal estrogen deficiency  -     DEXA BONE DENSITY STUDY AXIAL; Future    6. Encounter for immunization  -     INFLUENZA VACCINE INACTIVATED (IIV), SUBUNIT, ADJUVANTED, IM  -     ADMIN INFLUENZA VIRUS VAC      Follow-up Disposition:  Return in about 6 months (around 5/19/2019) for HTN, chol, CAD  .  lab results and schedule of future lab studies reviewed with patient  reviewed diet, exercise and weight control  cardiovascular risk and specific lipid/LDL goals reviewed  I have discussed the diagnosis, evaluation and treatment options and the intended plan with the patient.  Patient understands and is in agreement. The patient has received an after-visit summary and questions were answered concerning future plans. I have discussed side effects and warnings of any new medications with the patient as well.

## 2018-11-19 NOTE — PATIENT INSTRUCTIONS
Bring blood pressure cuff to next visit so we can be sure it is reading accurately  Office visit in 6 months             Vaccine Information Statement    Influenza (Flu) Vaccine (Inactivated or Recombinant): What you need to know    Many Vaccine Information Statements are available in Pashto and other languages. See www.immunize.org/vis  Hojas de Información Sobre Vacunas están disponibles en Español y en muchos otros idiomas. Visite www.immunize.org/vis    1. Why get vaccinated? Influenza (flu) is a contagious disease that spreads around the [place] every year, usually between October and May. Flu is caused by influenza viruses, and is spread mainly by coughing, sneezing, and close contact. Anyone can get flu. Flu strikes suddenly and can last several days. Symptoms vary by age, but can include:   fever/chills   sore throat   muscle aches   fatigue   cough   headache    runny or stuffy nose    Flu can also lead to pneumonia and blood infections, and cause diarrhea and seizures in children. If you have a medical condition, such as heart or lung disease, flu can make it worse. Flu is more dangerous for some people. Infants and young children, people 72years of age and older, pregnant women, and people with certain health conditions or a weakened immune system are at greatest risk. Each year thousands of people in the [facility] die from flu, and many more are hospitalized. Flu vaccine can:   keep you from getting flu,   make flu less severe if you do get it, and   keep you from spreading flu to your family and other people. 2. Inactivated and recombinant flu vaccines    A dose of flu vaccine is recommended every flu season. Children 6 months through 6years of age may need two doses during the same flu season. Everyone else needs only one dose each flu season.        Some inactivated flu vaccines contain a very small amount of a mercury-based preservative called thimerosal. Studies have not shown thimerosal in vaccines to be harmful, but flu vaccines that do not contain thimerosal are available. There is no live flu virus in flu shots. They cannot cause the flu. There are many flu viruses, and they are always changing. Each year a new flu vaccine is made to protect against three or four viruses that are likely to cause disease in the upcoming flu season. But even when the vaccine doesnt exactly match these viruses, it may still provide some protection    Flu vaccine cannot prevent:   flu that is caused by a virus not covered by the vaccine, or   illnesses that look like flu but are not. It takes about 2 weeks for protection to develop after vaccination, and protection lasts through the flu season. 3. Some people should not get this vaccine    Tell the person who is giving you the vaccine:     If you have any severe, life-threatening allergies. If you ever had a life-threatening allergic reaction after a dose of flu vaccine, or have a severe allergy to any part of this vaccine, you may be advised not to get vaccinated. Most, but not all, types of flu vaccine contain a small amount of egg protein.  If you ever had Guillain-Barré Syndrome (also called GBS). Some people with a history of GBS should not get this vaccine. This should be discussed with your doctor.  If you are not feeling well. It is usually okay to get flu vaccine when you have a mild illness, but you might be asked to come back when you feel better. 4. Risks of a vaccine reaction    With any medicine, including vaccines, there is a chance of reactions. These are usually mild and go away on their own, but serious reactions are also possible. Most people who get a flu shot do not have any problems with it.      Minor problems following a flu shot include:    soreness, redness, or swelling where the shot was given     hoarseness   sore, red or itchy eyes   cough   fever   aches   headache   itching   fatigue  If these problems occur, they usually begin soon after the shot and last 1 or 2 days. More serious problems following a flu shot can include the following:     There may be a small increased risk of Guillain-Barré Syndrome (GBS) after inactivated flu vaccine. This risk has been estimated at 1 or 2 additional cases per million people vaccinated. This is much lower than the risk of severe complications from flu, which can be prevented by flu vaccine.  Young children who get the flu shot along with pneumococcal vaccine (PCV13) and/or DTaP vaccine at the same time might be slightly more likely to have a seizure caused by fever. Ask your doctor for more information. Tell your doctor if a child who is getting flu vaccine has ever had a seizure. Problems that could happen after any injected vaccine:      People sometimes faint after a medical procedure, including vaccination. Sitting or lying down for about 15 minutes can help prevent fainting, and injuries caused by a fall. Tell your doctor if you feel dizzy, or have vision changes or ringing in the ears.  Some people get severe pain in the shoulder and have difficulty moving the arm where a shot was given. This happens very rarely.  Any medication can cause a severe allergic reaction. Such reactions from a vaccine are very rare, estimated at about 1 in a million doses, and would happen within a few minutes to a few hours after the vaccination. As with any medicine, there is a very remote chance of a vaccine causing a serious injury or death. The safety of vaccines is always being monitored. For more information, visit: www.cdc.gov/vaccinesafety/    5. What if there is a serious reaction? What should I look for?  Look for anything that concerns you, such as signs of a severe allergic reaction, very high fever, or unusual behavior.     Signs of a severe allergic reaction can include hives, swelling of the face and throat, difficulty breathing, a fast heartbeat, dizziness, and weakness - usually within a few minutes to a few hours after the vaccination. What should I do?  If you think it is a severe allergic reaction or other emergency that cant wait, call 9-1-1 and get the person to the nearest hospital. Otherwise, call your doctor.  Reactions should be reported to the Vaccine Adverse Event Reporting System (VAERS). Your doctor should file this report, or you can do it yourself through  the VAERS web site at www.vaers. [facility].gov, or by calling 1-576.503.8260. VAERS does not give medical advice. 6. The National Vaccine Injury Compensation Program    The [facility] Vaccine Injury Compensation Program (VICP) is a federal program that was created to compensate people who may have been injured by certain vaccines. Persons who believe they may have been injured by a vaccine can learn about the program and about filing a claim by calling 2-546.455.1788 or visiting the [employer] website at www.[facility].gov/vaccinecompensation. There is a time limit to file a claim for compensation. 7. How can I learn more?  Ask your healthcare provider. He or she can give you the vaccine package insert or suggest other sources of information.  Call your local or state health department.  Contact the Centers for Disease Control and Prevention (CDC):  - Call 6-494.836.9642 (1-800-CDC-INFO) or  - Visit CDCs website at www.cdc.gov/flu    Vaccine Information Statement   Inactivated Influenza Vaccine   8/7/2015  [address] NORAH Otto 144YQ-06    Department of Health and Human Services  Centers for Disease Control and Prevention    Office Use Only

## 2019-01-05 LAB
ALBUMIN SERPL-MCNC: 4.5 G/DL (ref 3.5–4.7)
ALBUMIN/GLOB SERPL: 1.5 {RATIO} (ref 1.2–2.2)
ALP SERPL-CCNC: 107 IU/L (ref 39–117)
ALT SERPL-CCNC: 18 IU/L (ref 0–32)
AST SERPL-CCNC: 27 IU/L (ref 0–40)
BILIRUB SERPL-MCNC: 0.4 MG/DL (ref 0–1.2)
BUN SERPL-MCNC: 17 MG/DL (ref 8–27)
BUN/CREAT SERPL: 20 (ref 12–28)
CALCIUM SERPL-MCNC: 10.1 MG/DL (ref 8.7–10.3)
CHLORIDE SERPL-SCNC: 102 MMOL/L (ref 96–106)
CHOLEST SERPL-MCNC: 156 MG/DL (ref 100–199)
CK SERPL-CCNC: 36 U/L (ref 24–173)
CO2 SERPL-SCNC: 24 MMOL/L (ref 20–29)
CREAT SERPL-MCNC: 0.84 MG/DL (ref 0.57–1)
GLOBULIN SER CALC-MCNC: 3 G/DL (ref 1.5–4.5)
GLUCOSE SERPL-MCNC: 101 MG/DL (ref 65–99)
HDLC SERPL-MCNC: 42 MG/DL
INTERPRETATION, 910389: NORMAL
LDLC SERPL CALC-MCNC: 93 MG/DL (ref 0–99)
POTASSIUM SERPL-SCNC: 4 MMOL/L (ref 3.5–5.2)
PROT SERPL-MCNC: 7.5 G/DL (ref 6–8.5)
SODIUM SERPL-SCNC: 142 MMOL/L (ref 134–144)
TRIGL SERPL-MCNC: 107 MG/DL (ref 0–149)
VLDLC SERPL CALC-MCNC: 21 MG/DL (ref 5–40)

## 2019-01-07 ENCOUNTER — TELEPHONE (OUTPATIENT)
Dept: CARDIOLOGY CLINIC | Age: 84
End: 2019-01-07

## 2019-01-07 DIAGNOSIS — I12.9 BENIGN HYPERTENSION WITH CHRONIC KIDNEY DISEASE, STAGE II: Primary | ICD-10-CM

## 2019-01-07 DIAGNOSIS — E78.00 HYPERCHOLESTEROLEMIA: ICD-10-CM

## 2019-01-07 DIAGNOSIS — N18.2 BENIGN HYPERTENSION WITH CHRONIC KIDNEY DISEASE, STAGE II: Primary | ICD-10-CM

## 2019-01-07 RX ORDER — ATORVASTATIN CALCIUM 40 MG/1
40 TABLET, FILM COATED ORAL DAILY
Qty: 90 TAB | Refills: 3 | Status: SHIPPED | OUTPATIENT
Start: 2019-01-07 | End: 2020-02-05

## 2019-01-07 NOTE — TELEPHONE ENCOUNTER
----- Message from Sky Dinero NP sent at 1/7/2019  8:48 AM EST -----  Kala Lorenzo: Please call patient LDL is 90 goal is 70, recommendation increase atorvastatin from 20 mg to 40 mg.  Recheck CMP, CK, lipids in 90 days.

## 2019-01-07 NOTE — PROGRESS NOTES
Spoke with patient regarding LAB results. Verified patient with two identifiers. Advised to increase Atorvastatin to 40 mg, will send in refill, repeat labs in 6 months will mail lab slip.

## 2019-01-07 NOTE — PROGRESS NOTES
Betsey Essex: Please call patient LDL is 90 goal is 70, recommendation increase atorvastatin from 20 mg to 40 mg.  Recheck CMP, CK, lipids in 90 days.

## 2019-01-16 ENCOUNTER — OFFICE VISIT (OUTPATIENT)
Dept: CARDIOLOGY CLINIC | Age: 84
End: 2019-01-16

## 2019-01-16 VITALS
HEIGHT: 64 IN | OXYGEN SATURATION: 96 % | RESPIRATION RATE: 16 BRPM | WEIGHT: 163.4 LBS | HEART RATE: 70 BPM | DIASTOLIC BLOOD PRESSURE: 74 MMHG | BODY MASS INDEX: 27.9 KG/M2 | SYSTOLIC BLOOD PRESSURE: 140 MMHG

## 2019-01-16 DIAGNOSIS — I25.10 ASHD (ARTERIOSCLEROTIC HEART DISEASE): Primary | ICD-10-CM

## 2019-01-16 DIAGNOSIS — I10 HYPERTENSION, ESSENTIAL: ICD-10-CM

## 2019-01-16 DIAGNOSIS — E78.2 MIXED HYPERLIPIDEMIA: ICD-10-CM

## 2019-01-16 NOTE — PROGRESS NOTES
1. Have you been to the ER, urgent care clinic since your last visit? Hospitalized since your last visit? No 
 
2. Have you seen or consulted any other health care providers outside of the [address] [place] [address] since your last visit? Include any pap smears or colon screening. Yes When: Dentist 11/2018 & Eye exam 1/2018 Patient has no cardiac complaints she C/O spinal stenosis with anxiety use of CBD oil relieves this and questions if appropriate to use with cardiac condition.

## 2019-01-16 NOTE — PROGRESS NOTES
[address], [place], [address] [facility]  [phone] Subjective: Marcello Reyes is a 80 y.o. female is here for routine f/u. Some mild dependent edema, resolves with leg elevation. The patient denies chest pain/ shortness of breath, orthopnea, PND, palpitations, syncope, or presyncope. Patient Active Problem List  
 Diagnosis Date Noted  LBBB (left bundle branch block) 07/24/2018  Coronary artery disease involving native coronary artery of native heart with angina pectoris with documented spasm ([facility] [address] 07/05/2018  Hypercholesterolemia 01/19/2017  Osteopenia with high risk of fracture 03/09/2016  Advance directive on file 11/28/2015  Tubular adenoma of colon 08/04/2014  Rotator cuff rupture 03/12/2013  GERD (gastroesophageal reflux disease) 03/14/2011  Benign hypertension with chronic kidney disease, stage II 03/01/2010  Plantar fasciitis 03/01/2010  Spinal stenosis 03/01/2010  Cervical neck pain with evidence of disc disease 03/01/2010 Sanam Richmond MD 
Past Medical History:  
Diagnosis Date  CAD (coronary artery disease)  GERD (gastroesophageal reflux disease)  Hypercholesterolemia  Hypertension  SOB (shortness of breath) 6/14/2018  UTI (urinary tract infection) 7/5/2018 Past Surgical History:  
Procedure Laterality Date [address] [place] [address] UNLISTED  2005 Laproscopic colon polyp excision Dr. Ammon Serna  CARDIAC SURG PROCEDURE UNLIST  07/05/2018 PCI/JAMISON to LAD and RCA  HX APPENDECTOMY Age 25  
[address]  07/05/2018  HX HEENT Bilateral   
 cataracts [address] [facility] Lumbar laminectomy  HX PTCA  [place] COLSC FLX W/RMVL OF TUMOR POLYP LESION SNARE TQ  7/18/2014 Allergies Allergen Reactions  Contrast Agent [Iodine] Hives  Penicillins Hives  Bystolic [Nebivolol] Other (comments)  
  abd pain  Cardizem [Diltiazem Hcl] Rash  Cardura [Doxazosin] Unknown (comments)  Codeine Nausea Only  Cymbalta [Duloxetine] Other (comments) Abdominal pain, anxiety  Gabapentin Other (comments) Made her feel crazy  Prinivil [Lisinopril] Unknown (comments)  Tekturna [Aliskiren] Other (comments)  
  abd cramps  Tenormin [Atenolol] Unknown (comments) Family History Problem Relation Age of Onset  Heart Disease Mother  Stroke Mother  Cancer Father  Cancer Brother  Heart Disease Brother  Cancer Brother Social History Socioeconomic History  Marital status:  Spouse name: Not on file  Number of children: Not on file  Years of education: Not on file  Highest education level: Not on file Social Needs  Financial resource strain: Not on file  Food insecurity - worry: Not on file  Food insecurity - inability: Not on file  Transportation needs - medical: Not on file  Transportation needs - non-medical: Not on file Occupational History  Not on file Tobacco Use  Smoking status: Former Smoker Last attempt to quit: 1982 Years since quittin.8  Smokeless tobacco: Never Used Substance and Sexual Activity  Alcohol use: No  
 Drug use: No  
 Sexual activity: Not on file Other Topics Concern  Not on file Social History Narrative  Not on file Current Outpatient Medications Medication Sig  
 atorvastatin (LIPITOR) 40 mg tablet Take 1 Tab by mouth daily.  metoprolol succinate (TOPROL-XL) 25 mg XL tablet Take 0.5 Tabs by mouth daily.  clopidogrel (PLAVIX) 75 mg tab Take 1 Tab by mouth daily.  losartan (COZAAR) 100 mg tablet TAKE ONE TABLET BY MOUTH ONCE DAILY  amLODIPine (NORVASC) 5 mg tablet Take 1 Tab by mouth daily.  furosemide (LASIX) 20 mg tablet Take 1 Tab by mouth daily. Reduced 2018  raNITIdine (ZANTAC) 150 mg tablet Take 150 mg by mouth as needed.  aspirin 81 mg chewable tablet Take 1 Tab by mouth daily.  acetaminophen (TYLENOL ARTHRITIS PAIN) 650 mg TbER Take 650 mg by mouth every eight (8) hours.  cloNIDine HCl (CATAPRES) 0.1 mg tablet Take 1 Tab by mouth two (2) times a day. Indications: hypertension  cholecalciferol (VITAMIN D3) 1,000 unit cap Take  by mouth daily.  magnesium oxide (MAG-OX) 400 mg tablet Take 1 Tab by mouth daily. (Patient taking differently: Take 400 mg by mouth as needed.)  polyethylene glycol (MIRALAX) 17 gram/dose powder Take 17 g by mouth daily.  clotrimazole (LOTRIMIN) 1 % topical cream Apply twice a day and rub into skin for 3 weeks. (Patient not taking: Reported on 1/16/2019) No current facility-administered medications for this visit. Review of Symptoms: 
11 systems reviewed, negative other than as stated in the HPI Physical ExamPhysical Exam:   
Vitals:  
 01/16/19 1110 01/16/19 1111 BP: 140/70 150/80 Pulse: 70 Resp: 16 SpO2: 96% Weight: 163 lb 6.4 oz (74.1 kg) Height: 5' 4\" (1.626 m) Body mass index is 28.05 kg/m². General PE Gen:  NAD Mental Status - Alert. General Appearance - Not in acute distress. Chest and Lung Exam  
Inspection: Accessory muscles - No use of accessory muscles in breathing. Auscultation:  
Breath sounds: - Normal.  
Cardiovascular Inspection: Jugular vein - Bilateral - Inspection Normal.  
Palpation/Percussion:  
Apical Impulse: - Normal.  
Auscultation: Rhythm - Regular. Heart Sounds - S1 WNL and S2 WNL. No S3 or S4. Murmurs & Other Heart Sounds: Auscultation of the heart reveals - No Murmurs. Peripheral Vascular Upper Extremity: Inspection - Bilateral - No Cyanotic nailbeds or Digital clubbing. Lower Extremity:  
Palpation: Edema - Bilateral - No edema. Abdomen:   Soft, non-tender, bowel sounds are active. Neuro: A&O times 3, CN and motor grossly WNL Labs:  
Lab Results Component Value Date/Time Cholesterol, total 156 01/04/2019 08:01 AM  
 Cholesterol, total 148 07/19/2017 12:00 AM  
 Cholesterol, total 158 07/12/2016 07:48 AM  
 Cholesterol, total 154 02/12/2015 12:00 AM  
 Cholesterol, total 167 07/14/2014 12:00 AM  
 HDL Cholesterol 42 01/04/2019 08:01 AM  
 HDL Cholesterol 43 07/19/2017 12:00 AM  
 HDL Cholesterol 45 07/12/2016 07:48 AM  
 HDL Cholesterol 42 02/12/2015 12:00 AM  
 HDL Cholesterol 47 07/14/2014 12:00 AM  
 LDL, calculated 93 01/04/2019 08:01 AM  
 LDL, calculated 82 07/19/2017 12:00 AM  
 LDL, calculated 88 07/12/2016 07:48 AM  
 LDL, calculated 93 02/12/2015 12:00 AM  
 LDL, calculated 101 (H) 07/14/2014 12:00 AM  
 Triglyceride 107 01/04/2019 08:01 AM  
 Triglyceride 115 07/19/2017 12:00 AM  
 Triglyceride 127 07/12/2016 07:48 AM  
 Triglyceride 94 02/12/2015 12:00 AM  
 Triglyceride 95 07/14/2014 12:00 AM  
 
Lab Results Component Value Date/Time CK 33 06/12/2018 11:50 AM  
 
Lab Results Component Value Date/Time Sodium 142 01/04/2019 08:01 AM  
 Potassium 4.0 01/04/2019 08:01 AM  
 Chloride 102 01/04/2019 08:01 AM  
 CO2 24 01/04/2019 08:01 AM  
 Anion gap 7 06/14/2018 10:10 AM  
 Glucose 101 (H) 01/04/2019 08:01 AM  
 BUN 17 01/04/2019 08:01 AM  
 Creatinine 0.84 01/04/2019 08:01 AM  
 BUN/Creatinine ratio 20 01/04/2019 08:01 AM  
 GFR est AA 74 01/04/2019 08:01 AM  
 GFR est non-AA 64 01/04/2019 08:01 AM  
 Calcium 10.1 01/04/2019 08:01 AM  
 Bilirubin, total 0.4 01/04/2019 08:01 AM  
 AST (SGOT) 27 01/04/2019 08:01 AM  
 Alk. phosphatase 107 01/04/2019 08:01 AM  
 Protein, total 7.5 01/04/2019 08:01 AM  
 Albumin 4.5 01/04/2019 08:01 AM  
 Globulin 3.7 06/12/2018 11:50 AM  
 A-G Ratio 1.5 01/04/2019 08:01 AM  
 ALT (SGPT) 18 01/04/2019 08:01 AM  
 
 
EKG: 
NSR LBBB Assessment: 
 
 Assessment: 1. ASHD (arteriosclerotic heart disease) 2. Hypertension, essential   
3. Mixed hyperlipidemia Orders Placed This Encounter  AMB POC EKG ROUTINE W/ 12 LEADS, INTER & REP Order Specific Question:   Reason for Exam: Answer:   routine Plan:  
 
Patient presents for f/u,  doing well and stable from cardiac standpoint. Goes to the gym at least twice a week as part of her exercise regimen. Some mild dependent edema, resolves with leg elevation. Atherosclerotic heart disease/status post PTCA stenting of the RCA and LAD 7/18:  
Continue current medications including dual antiplatelet therapy uninterrupted for 1 year. Hypertension Controlled. Continue current medications Hyperlipidemia: 
1/19 LDL at 93, goal 70. Arbutus Ring Atorvastatin was increased to 40 mg daily. Will check lipids in 3 months Continue current care and f/u in 6 months. Tereza Chamorro NP [facility] 1/16/2019 Patient seen, examined by me personally. Plan discussed as detailed. Agree with note as outlined by  NP. I confirm findings in history and physical exam. No additional findings noted. Agree with plan as outlined above. Stable from cardiac stand point.  
 
Alison Kaminski MD

## 2019-03-06 ENCOUNTER — TELEPHONE (OUTPATIENT)
Dept: CARDIOLOGY CLINIC | Age: 84
End: 2019-03-06

## 2019-03-06 NOTE — TELEPHONE ENCOUNTER
Verified patient with two identifiers. Spoke with pt she is having some blood in stool. Advised her to call her pcp. She stated she has an appt with her GI doctor for consultation. Asked if Plavix caused it and assured her it did not. Pt verbalized understanding.

## 2019-04-09 ENCOUNTER — DOCUMENTATION ONLY (OUTPATIENT)
Dept: CARDIOLOGY CLINIC | Age: 84
End: 2019-04-09

## 2019-04-09 DIAGNOSIS — I12.9 BENIGN HYPERTENSION WITH CHRONIC KIDNEY DISEASE, STAGE II: ICD-10-CM

## 2019-04-09 DIAGNOSIS — E78.00 HYPERCHOLESTEROLEMIA: ICD-10-CM

## 2019-04-09 DIAGNOSIS — N18.2 BENIGN HYPERTENSION WITH CHRONIC KIDNEY DISEASE, STAGE II: ICD-10-CM

## 2019-04-09 NOTE — PROGRESS NOTES
Faxed clearance note to [facility] @[phone]. Pt is cleared for colonoscopy may hold plavix 5 days prior.

## 2019-04-15 DIAGNOSIS — E78.2 MIXED HYPERLIPIDEMIA: ICD-10-CM

## 2019-04-15 DIAGNOSIS — I10 HYPERTENSION, ESSENTIAL: ICD-10-CM

## 2019-04-15 DIAGNOSIS — I25.10 ASHD (ARTERIOSCLEROTIC HEART DISEASE): ICD-10-CM

## 2019-04-20 LAB
ALBUMIN SERPL-MCNC: 4.3 G/DL (ref 3.5–4.7)
ALBUMIN/GLOB SERPL: 1.7 {RATIO} (ref 1.2–2.2)
ALP SERPL-CCNC: 99 IU/L (ref 39–117)
ALT SERPL-CCNC: 15 IU/L (ref 0–32)
AST SERPL-CCNC: 21 IU/L (ref 0–40)
BILIRUB SERPL-MCNC: 0.4 MG/DL (ref 0–1.2)
BUN SERPL-MCNC: 13 MG/DL (ref 8–27)
BUN/CREAT SERPL: 15 (ref 12–28)
CALCIUM SERPL-MCNC: 9.8 MG/DL (ref 8.7–10.3)
CHLORIDE SERPL-SCNC: 103 MMOL/L (ref 96–106)
CHOLEST SERPL-MCNC: 120 MG/DL (ref 100–199)
CK SERPL-CCNC: 33 U/L (ref 24–173)
CO2 SERPL-SCNC: 24 MMOL/L (ref 20–29)
CREAT SERPL-MCNC: 0.86 MG/DL (ref 0.57–1)
GLOBULIN SER CALC-MCNC: 2.5 G/DL (ref 1.5–4.5)
GLUCOSE SERPL-MCNC: 90 MG/DL (ref 65–99)
HDLC SERPL-MCNC: 36 MG/DL
INTERPRETATION, 910389: NORMAL
LDLC SERPL CALC-MCNC: 64 MG/DL (ref 0–99)
POTASSIUM SERPL-SCNC: 4.2 MMOL/L (ref 3.5–5.2)
PROT SERPL-MCNC: 6.8 G/DL (ref 6–8.5)
SODIUM SERPL-SCNC: 142 MMOL/L (ref 134–144)
TRIGL SERPL-MCNC: 99 MG/DL (ref 0–149)
VLDLC SERPL CALC-MCNC: 20 MG/DL (ref 5–40)

## 2019-05-15 ENCOUNTER — ANESTHESIA (OUTPATIENT)
Dept: ENDOSCOPY | Age: 84
End: 2019-05-15
Payer: MEDICARE

## 2019-05-15 ENCOUNTER — ANESTHESIA EVENT (OUTPATIENT)
Dept: ENDOSCOPY | Age: 84
End: 2019-05-15
Payer: MEDICARE

## 2019-05-15 ENCOUNTER — HOSPITAL ENCOUNTER (OUTPATIENT)
Age: 84
Setting detail: OUTPATIENT SURGERY
Discharge: HOME | End: 2019-05-15
Attending: INTERNAL MEDICINE | Admitting: INTERNAL MEDICINE
Payer: MEDICARE

## 2019-05-15 VITALS
RESPIRATION RATE: 17 BRPM | WEIGHT: 158 LBS | HEIGHT: 64 IN | OXYGEN SATURATION: 99 % | HEART RATE: 85 BPM | TEMPERATURE: 97.7 F | DIASTOLIC BLOOD PRESSURE: 89 MMHG | BODY MASS INDEX: 26.98 KG/M2 | SYSTOLIC BLOOD PRESSURE: 145 MMHG

## 2019-05-15 PROCEDURE — 74011250636 HC RX REV CODE- 250/636: Performed by: INTERNAL MEDICINE

## 2019-05-15 PROCEDURE — 76060000031 HC ANESTHESIA FIRST 0.5 HR: Performed by: INTERNAL MEDICINE

## 2019-05-15 PROCEDURE — 74011250636 HC RX REV CODE- 250/636

## 2019-05-15 PROCEDURE — 76040000019: Performed by: INTERNAL MEDICINE

## 2019-05-15 PROCEDURE — 88305 TISSUE EXAM BY PATHOLOGIST: CPT

## 2019-05-15 PROCEDURE — 74011000250 HC RX REV CODE- 250

## 2019-05-15 PROCEDURE — 77030013992 HC SNR POLYP ENDOSC BSC -B: Performed by: INTERNAL MEDICINE

## 2019-05-15 RX ORDER — NALOXONE HYDROCHLORIDE 0.4 MG/ML
0.4 INJECTION, SOLUTION INTRAMUSCULAR; INTRAVENOUS; SUBCUTANEOUS
Status: DISCONTINUED | OUTPATIENT
Start: 2019-05-15 | End: 2019-05-15 | Stop reason: HOSPADM

## 2019-05-15 RX ORDER — FLUMAZENIL 0.1 MG/ML
0.2 INJECTION INTRAVENOUS
Status: DISCONTINUED | OUTPATIENT
Start: 2019-05-15 | End: 2019-05-15 | Stop reason: HOSPADM

## 2019-05-15 RX ORDER — LIDOCAINE HYDROCHLORIDE 20 MG/ML
INJECTION, SOLUTION EPIDURAL; INFILTRATION; INTRACAUDAL; PERINEURAL AS NEEDED
Status: DISCONTINUED | OUTPATIENT
Start: 2019-05-15 | End: 2019-05-15 | Stop reason: HOSPADM

## 2019-05-15 RX ORDER — SODIUM CHLORIDE 0.9 % (FLUSH) 0.9 %
5-40 SYRINGE (ML) INJECTION EVERY 8 HOURS
Status: DISCONTINUED | OUTPATIENT
Start: 2019-05-15 | End: 2019-05-15 | Stop reason: HOSPADM

## 2019-05-15 RX ORDER — METOPROLOL TARTRATE 5 MG/5ML
INJECTION INTRAVENOUS AS NEEDED
Status: DISCONTINUED | OUTPATIENT
Start: 2019-05-15 | End: 2019-05-15

## 2019-05-15 RX ORDER — DEXTROMETHORPHAN/PSEUDOEPHED 2.5-7.5/.8
1.2 DROPS ORAL
Status: DISCONTINUED | OUTPATIENT
Start: 2019-05-15 | End: 2019-05-15 | Stop reason: HOSPADM

## 2019-05-15 RX ORDER — SODIUM CHLORIDE 0.9 % (FLUSH) 0.9 %
5-40 SYRINGE (ML) INJECTION AS NEEDED
Status: DISCONTINUED | OUTPATIENT
Start: 2019-05-15 | End: 2019-05-15 | Stop reason: HOSPADM

## 2019-05-15 RX ORDER — PROPOFOL 10 MG/ML
INJECTION, EMULSION INTRAVENOUS AS NEEDED
Status: DISCONTINUED | OUTPATIENT
Start: 2019-05-15 | End: 2019-05-15 | Stop reason: HOSPADM

## 2019-05-15 RX ORDER — METOPROLOL TARTRATE 5 MG/5ML
INJECTION INTRAVENOUS AS NEEDED
Status: DISCONTINUED | OUTPATIENT
Start: 2019-05-15 | End: 2019-05-15 | Stop reason: HOSPADM

## 2019-05-15 RX ORDER — EPINEPHRINE 0.1 MG/ML
1 INJECTION INTRACARDIAC; INTRAVENOUS
Status: DISCONTINUED | OUTPATIENT
Start: 2019-05-15 | End: 2019-05-15 | Stop reason: HOSPADM

## 2019-05-15 RX ORDER — ATROPINE SULFATE 0.1 MG/ML
0.5 INJECTION INTRAVENOUS
Status: DISCONTINUED | OUTPATIENT
Start: 2019-05-15 | End: 2019-05-15 | Stop reason: HOSPADM

## 2019-05-15 RX ORDER — SODIUM CHLORIDE 9 MG/ML
100 INJECTION, SOLUTION INTRAVENOUS CONTINUOUS
Status: DISCONTINUED | OUTPATIENT
Start: 2019-05-15 | End: 2019-05-15 | Stop reason: HOSPADM

## 2019-05-15 RX ORDER — GLYCOPYRROLATE 0.2 MG/ML
INJECTION INTRAMUSCULAR; INTRAVENOUS AS NEEDED
Status: DISCONTINUED | OUTPATIENT
Start: 2019-05-15 | End: 2019-05-15 | Stop reason: HOSPADM

## 2019-05-15 RX ADMIN — LIDOCAINE HYDROCHLORIDE 40 MG: 20 INJECTION, SOLUTION EPIDURAL; INFILTRATION; INTRACAUDAL; PERINEURAL at 11:04

## 2019-05-15 RX ADMIN — GLYCOPYRROLATE 0.1 MG: 0.2 INJECTION INTRAMUSCULAR; INTRAVENOUS at 11:11

## 2019-05-15 RX ADMIN — SODIUM CHLORIDE 100 ML/HR: 900 INJECTION, SOLUTION INTRAVENOUS at 10:56

## 2019-05-15 RX ADMIN — METOPROLOL TARTRATE 1 MG: 5 INJECTION INTRAVENOUS at 11:04

## 2019-05-15 RX ADMIN — PROPOFOL 150 MG: 10 INJECTION, EMULSION INTRAVENOUS at 11:25

## 2019-05-15 RX ADMIN — METOPROLOL TARTRATE 1 MG: 5 INJECTION INTRAVENOUS at 11:02

## 2019-05-15 NOTE — DISCHARGE INSTRUCTIONS
Ryne Olivier MD  [facility], [address], [facility]  [facility], [address] [facility]  [phone]  [email or web address]    Alayna Stahl  [id number]  1934    COLON DISCHARGE INSTRUCTIONS  Discomfort:  Redness at IV site- apply warm compress to area; if redness or soreness persist- contact your physician  There may be a slight amount of blood passed from the rectum  Gaseous discomfort- walking, belching will help relieve any discomfort  You may not operate a vehicle for 12 hours  You may not engage in an occupation involving machinery or appliances for rest of today  You may not drink alcoholic beverages for at least 12 hours  Avoid making any critical decisions for at least 24 hour  DIET:   High fiber diet. - however -  remember your colon is empty and a heavy meal will produce gas. Avoid these foods:  vegetables, fried / greasy foods, carbonated drinks for today      ACTIVITY:  You may resume your normal daily activities it is recommended that you spend the remainder of the day resting -  avoid any strenuous activity. CALL M.D. ANY SIGN OF:   Increasing pain, nausea, vomiting  Abdominal distension (swelling)  New increased bleeding (oral or rectal)  Fever (chills)  Pain in chest area  Bloody discharge from nose or mouth  Shortness of breath     COLONOSCOPY FINDINGS:  Your colonoscopy showed: Medium sized internal hemorrhoids; mild sigmoid diverticulosis and small rectal polyp. Follow-up Instructions:    Stay off plavix for another 7 days since we removed a polyp. Call Dr. Ryne Olivier if any questions or problems. Telephone # [phone]  Dr. Vinson Face office will notify you of the biopsy results within 7 to 10 days.

## 2019-05-15 NOTE — H&P
Augusta Richards MD 
[facility], [address] [place] [address], [address] [place], 200 [facility] 
[phone] 
[email or web address] ([email or web address]) Gastroenterology Outpatient History and Physical 
 
Patient: Amada Lucero Physician: Margareth Manzanares MD 
 
Vital Signs: Blood pressure (!) 192/99, pulse (!) 102, temperature 97.8 °F (36.6 °C), resp. rate 26, height 5' 4\" (1.626 m), weight 71.7 kg (158 lb), SpO2 98 %, not currently breastfeeding. Allergies: Allergies Allergen Reactions  Contrast Agent [Iodine] Hives  Penicillins Hives  Bystolic [Nebivolol] Other (comments)  
  abd pain  Cardizem [Diltiazem Hcl] Rash  Cardura [Doxazosin] Unknown (comments)  Codeine Nausea Only  Cymbalta [Duloxetine] Other (comments) Abdominal pain, anxiety  Gabapentin Other (comments) Made her feel crazy  Prinivil [Lisinopril] Unknown (comments)  Tekturna [Aliskiren] Other (comments)  
  abd cramps  Tenormin [Atenolol] Unknown (comments) Chief Complaint: rectal bleeding History of Present Illness: Here for colonoscopy to evaluate episodes of red rectal bleeding. See OV note for more details. History: 
Past Medical History:  
Diagnosis Date  CAD (coronary artery disease)  GERD (gastroesophageal reflux disease)  Hypercholesterolemia  Hypertension  SOB (shortness of breath) 6/14/2018  UTI (urinary tract infection) 7/5/2018 Past Surgical History:  
Procedure Laterality Date [address] [place] [address] UNLISTED  2005 Laproscopic colon polyp excision Dr. Joaquina Kimbrough  CARDIAC SURG PROCEDURE UNLIST  07/05/2018 PCI/JAMISON to LAD and RCA  HX APPENDECTOMY Age 25  
[address]  07/05/2018  HX HEENT Bilateral   
 cataracts [address] [facility] [address] Lumbar laminectomy  HX PTCA  [place] COLSC FLX W/RMVL OF TUMOR POLYP LESION SNARE TQ  7/18/2014 Social History Socioeconomic History  Marital status:  Spouse name: Not on file  Number of children: Not on file  Years of education: Not on file  Highest education level: Not on file Tobacco Use  Smoking status: Former Smoker Last attempt to quit: 1982 Years since quittin.1  Smokeless tobacco: Never Used Substance and Sexual Activity  Alcohol use: No  
 Drug use: No  
  
Family History Problem Relation Age of Onset  Heart Disease Mother  Stroke Mother  Cancer Father  Cancer Brother  Heart Disease Brother  Cancer Brother Patient Active Problem List  
Diagnosis Code  Benign hypertension with chronic kidney disease, 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, N18.2  Plantar fasciitis M72.2  Spinal stenosis M48.00  Cervical neck pain with evidence of disc disease M50.90  GERD (gastroesophageal reflux disease) K21.9  Rotator cuff rupture M75.100  Tubular adenoma of colon D12.6  Advance directive on file U63.1  Osteopenia with high risk of fracture M85.80  Hypercholesterolemia E78.00  Coronary artery disease involving native coronary artery of native heart with angina pectoris with documented spasm ([facility] [address] I25.111  LBBB (left bundle branch block) I44.7 Medications:  
Prior to Admission medications Medication Sig Start Date End Date Taking? Authorizing Provider  
atorvastatin (LIPITOR) 40 mg tablet Take 1 Tab by mouth daily. 19  Yes Birgit Mathis NP  
metoprolol succinate (TOPROL-XL) 25 mg XL tablet Take 0.5 Tabs by mouth daily. 18  Yes Faiza Anthony MD  
losartan (COZAAR) 100 mg tablet TAKE ONE TABLET BY MOUTH ONCE DAILY 10/31/18  Yes Faiza Anthony MD  
amLODIPine (NORVASC) 5 mg tablet Take 1 Tab by mouth daily. 18  Yes Faiza Anthony MD  
furosemide (LASIX) 20 mg tablet Take 1 Tab by mouth daily. Reduced 2018  Yes Birgit Mathis NP  
raNITIdine (ZANTAC) 150 mg tablet Take 150 mg by mouth as needed.  4/3/18 Yes Provider, Historical  
aspirin 81 mg chewable tablet Take 1 Tab by mouth daily. 6/19/18  Yes Lennox Brush., NP  
cloNIDine HCl (CATAPRES) 0.1 mg tablet Take 1 Tab by mouth two (2) times a day. Indications: hypertension 6/4/18  Yes Zeyad Jones MD  
cholecalciferol (VITAMIN D3) 1,000 unit cap Take  by mouth daily. Yes Provider, Historical  
magnesium oxide (MAG-OX) 400 mg tablet Take 1 Tab by mouth daily. Patient taking differently: Take 400 mg by mouth as needed. 7/17/13  Yes Anahi Espinal MD  
polyethylene glycol [facility]) 17 gram/dose powder Take 17 g by mouth daily. Yes Provider, Historical  
clopidogrel (PLAVIX) 75 mg tab Take 1 Tab by mouth daily. 11/19/18   Zeyad Jones MD  
acetaminophen (TYLENOL ARTHRITIS PAIN) 650 mg TbER Take 650 mg by mouth every eight (8) hours. Provider, Historical  
 
 
Physical Exam:  
 
General: well developed, well nourished HEENT: unremarkable Heart: regular rhythm no mumur Lungs: clear Abdominal:  benign Neurological: unremarkable Extremities: no edema Findings/Diagnosis: Rectal bleeding Plan of Care/Planned Procedure: Colonoscopy with  monitored anesthesia care sedation Signed: 
Madge Cranker., MD 5/15/2019

## 2019-05-15 NOTE — ROUTINE PROCESS
Pura Renee 1934 
[id number] Situation: 
Verbal report received from: MANJEET Jones Procedure: Procedure(s): 
COLONOSCOPY 
ENDOSCOPIC POLYPECTOMY Background: 
 
Preoperative diagnosis: [facility] Postoperative diagnosis: Internal hemorrhoids, polyp, diverticulosis :  Dr. Tanja Card Assistant(s): Endoscopy Technician-1: Tami Rose Endoscopy RN-1: Esther Aguilar RN Specimens:  
ID Type Source Tests Collected by Time Destination 1 : polyp Preservative Rectum  Nohemi Olivier MD 5/15/2019 1124 Pathology H. Pylori  no Assessment: 
Intra-procedure medications Anesthesia gave intra-procedure sedation and medications, see anesthesia flow sheet yes Intravenous fluids: NS@ Horris Harms Vital signs stable yes Abdominal assessment: round and soft  yes Recommendation: 
Discharge patient per MD order yes. Family or Jade Jarrod, daughter Permission to share finding with family or friend yes

## 2019-05-15 NOTE — ANESTHESIA POSTPROCEDURE EVALUATION
Procedure(s): 
COLONOSCOPY 
ENDOSCOPIC POLYPECTOMY. total IV anesthesia Anesthesia Post Evaluation Patient location during evaluation: PACU Note status: Adequate. Level of consciousness: responsive to verbal stimuli and sleepy but conscious Pain management: satisfactory to patient Airway patency: patent Anesthetic complications: no 
Cardiovascular status: acceptable Respiratory status: acceptable Hydration status: acceptable Comments: +Post-Anesthesia Evaluation and Assessment Patient: Raman Quezada MRN: [id number]  SSN: [id number] YOB: 1934  Age: 80 y.o. Sex: female Cardiovascular Function/Vital Signs /72   Pulse 88   Temp 36.6 °C (97.8 °F)   Resp 18   Ht 5' 4\" (1.626 m)   Wt 71.7 kg (158 lb)   SpO2 100%   Breastfeeding? No   BMI 27.12 kg/m² Patient is status post Procedure(s): 
COLONOSCOPY 
ENDOSCOPIC POLYPECTOMY. Nausea/Vomiting: Controlled. Postoperative hydration reviewed and adequate. Pain: 
Pain Scale 1: Numeric (0 - 10) (05/15/19 1051) Pain Intensity 1: 0 (05/15/19 1051) Managed. Neurological Status: At baseline. Mental Status and Level of Consciousness: Arousable. Pulmonary Status:  
O2 Device: CO2 nasal cannula (05/15/19 1127) Adequate oxygenation and airway patent. Complications related to anesthesia: None Post-anesthesia assessment completed. No concerns. Signed By: Elizabeth Chowdhury MD  
 5/15/2019 Post anesthesia nausea and vomiting:  controlled Vitals Value Taken Time /72 5/15/2019 11:27 AM  
Temp Pulse 88 5/15/2019 11:27 AM  
Resp 18 5/15/2019 11:27 AM  
SpO2 100 % 5/15/2019 11:27 AM

## 2019-05-15 NOTE — PROCEDURES
[facility]                  Colonoscopy Operative Report    5/15/2019      Siomara Diaz  [id number]  1934    Procedure Type:   Colonoscopy with polypectomy (cold snare)     Indications:    Lower rectal bleeding     Pre-operative Diagnosis: see indication above    Post-operative Diagnosis:  See findings below    :  Jana Lujan MD    Referring Provider: Aura Monroe MD      Sedation:  MAC anesthesia Propofol    Pre-Procedural Exam:      Airway: clear,  No airway problems anticipated  Heart: RRR, without gallops or rubs  Lungs: clear bilaterally without wheezes, crackles, or rhonchi  Abdomen: soft, nontender, nondistended, bowel sounds present  Mental Status: awake, alert and oriented to person, place and time     Procedure Details:  After informed consent was obtained with all risks and benefits of procedure explained and preoperative exam completed, the patient was taken to the endoscopy suite and placed in the left lateral decubitus position. Upon sequential sedation as per above, a digital rectal exam was performed . The Olympus videocolonoscope  was inserted in the rectum and carefully advanced to the cecum, which was identified by the ileocecal valve and appendiceal orifice. The cecum was identified by the ileocecal valve and appendiceal orifice. The quality of preparation was adequate. The colonoscope was slowly withdrawn with careful evaluation between folds. Retroflexion in the rectum was completed demonstrating internal hemorrhoids. Findings:   Rectum: Grade 1 internal hemorrhoid(s)--medium sized and ; 5-6 mm polyp cold snared  Sigmoid:     - Diverticulosis  Descending Colon: normal  Transverse Colon: normal  Ascending Colon: normal  Cecum: normal  Terminal Ileum: not intubated      Specimen Removed:  rectal polyp    Complications: None. EBL:  None.     Impression:    Medium sized internal hemorrhoids; mild sigmoid diverticulosis and small rectal polyp    Recommendations: --Await pathology. High fiber diet. Discontinue plavix for another 7 days   Discharge Disposition:  Home in the company of a  when able to ambulate. Barbi Collins MD    5/15/2019     PRO Cardona MD  [facility], [address] [facility]  [address]  [phone]  [email or web address]

## 2019-05-15 NOTE — PROGRESS NOTES
Endoscope was pre-cleaned at the bedside immediately following procedure by Lourdes Puri Anesthesia reports 150mg Propofol, 40mg Lidocaine and 450mL NS given during procedure. Received report from anesthesia staff on vital signs and status of patient.

## 2019-05-15 NOTE — ANESTHESIA PREPROCEDURE EVALUATION
Anesthetic History No history of anesthetic complications Review of Systems / Medical History Patient summary reviewed, nursing notes reviewed and pertinent labs reviewed Pulmonary Shortness of breath and smoker Comments: Former Smoker Neuro/Psych Within defined limits Comments: Spinal stenosis Cardiovascular Hypertension: well controlled CAD, CABG and hyperlipidemia Exercise tolerance: <4 METS Comments: LBBB 
  
GI/Hepatic/Renal 
  
GERD: well controlled Comments: Family history of colon cancer History of colon polyps x 2,  2009 Endo/Other Other Findings Comments: cervical neck pain from disc disease Plantar fasciitis Physical Exam 
 
Airway Mallampati: I 
TM Distance: 4 - 6 cm Neck ROM: normal range of motion Mouth opening: Normal 
 
 Cardiovascular Regular rate and rhythm,  S1 and S2 normal,  no murmur, click, rub, or gallop Rhythm: regular Rate: normal 
 
 
 
 Dental 
 
Dentition: Caps/crowns Pulmonary Breath sounds clear to auscultation Abdominal 
GI exam deferred Other Findings Anesthetic Plan ASA: 3 Anesthesia type: total IV anesthesia Induction: Intravenous Anesthetic plan and risks discussed with: Patient

## 2019-05-19 PROBLEM — D12.8 TUBULAR ADENOMA OF RECTUM: Status: ACTIVE | Noted: 2019-05-19

## 2019-05-20 ENCOUNTER — OFFICE VISIT (OUTPATIENT)
Dept: INTERNAL MEDICINE CLINIC | Facility: CLINIC | Age: 84
End: 2019-05-20

## 2019-05-20 VITALS
TEMPERATURE: 97.9 F | RESPIRATION RATE: 12 BRPM | OXYGEN SATURATION: 97 % | WEIGHT: 160.5 LBS | BODY MASS INDEX: 27.4 KG/M2 | HEIGHT: 64 IN | HEART RATE: 62 BPM | SYSTOLIC BLOOD PRESSURE: 142 MMHG | DIASTOLIC BLOOD PRESSURE: 89 MMHG

## 2019-05-20 DIAGNOSIS — N18.2 BENIGN HYPERTENSION WITH CHRONIC KIDNEY DISEASE, STAGE II: ICD-10-CM

## 2019-05-20 DIAGNOSIS — Z71.89 ADVANCE DIRECTIVE DISCUSSED WITH PATIENT: ICD-10-CM

## 2019-05-20 DIAGNOSIS — I12.9 BENIGN HYPERTENSION WITH CHRONIC KIDNEY DISEASE, STAGE II: ICD-10-CM

## 2019-05-20 DIAGNOSIS — K21.9 GASTROESOPHAGEAL REFLUX DISEASE WITHOUT ESOPHAGITIS: ICD-10-CM

## 2019-05-20 DIAGNOSIS — E78.00 HYPERCHOLESTEROLEMIA: ICD-10-CM

## 2019-05-20 DIAGNOSIS — I25.111 CORONARY ARTERY DISEASE INVOLVING NATIVE CORONARY ARTERY OF NATIVE HEART WITH ANGINA PECTORIS WITH DOCUMENTED SPASM (HCC): ICD-10-CM

## 2019-05-20 DIAGNOSIS — Z78.0 POSTMENOPAUSAL ESTROGEN DEFICIENCY: ICD-10-CM

## 2019-05-20 DIAGNOSIS — E66.3 OVERWEIGHT (BMI 25.0-29.9): ICD-10-CM

## 2019-05-20 DIAGNOSIS — Z00.00 MEDICARE ANNUAL WELLNESS VISIT, SUBSEQUENT: Primary | ICD-10-CM

## 2019-05-20 RX ORDER — CLOPIDOGREL BISULFATE 75 MG/1
TABLET ORAL
Refills: 2 | COMMUNITY
Start: 2019-05-06 | End: 2019-07-29

## 2019-05-20 NOTE — PROGRESS NOTES
Kayden Tompkins  Identified pt with two pt identifiers(name and ). Chief Complaint   Patient presents with    Hypertension     Room 1B    Cholesterol Problem    Coronary Artery Disease       Reviewed record In preparation for visit and have obtained necessary documentation. Advanced directive / living will on file. 1. Have you been to the ER, urgent care clinic or hospitalized since your last visit? No     2. Have you seen or consulted any other health care providers outside of the [address] [place] [address] since your last visit? Include any pap smears or colon screening. Dr Louise Buitrago, colonoscopy     Vitals reviewed with provider.     Health Maintenance reviewed:     Health Maintenance Due   Topic    Shingrix Vaccine Age 50> (1 of 2)    MEDICARE YEARLY EXAM           Wt Readings from Last 3 Encounters:   19 160 lb 8 oz (72.8 kg)   05/15/19 158 lb (71.7 kg)   19 163 lb 6.4 oz (74.1 kg)        Temp Readings from Last 3 Encounters:   19 97.9 °F (36.6 °C) (Oral)   05/15/19 97.7 °F (36.5 °C)   18 97.6 °F (36.4 °C) (Oral)        BP Readings from Last 3 Encounters:   19 142/89   05/15/19 145/89   19 140/74        Pulse Readings from Last 3 Encounters:   19 62   05/15/19 85   19 70        Vitals:    19 1009 19 1012   BP: (!) 157/91 142/89   Pulse: 62    Resp: 12    Temp: 97.9 °F (36.6 °C)    TempSrc: Oral    SpO2: 97%    Weight: 160 lb 8 oz (72.8 kg)    Height: 5' 4\" (1.626 m)    PainSc:   6    PainLoc: Neck           Learning Assessment:   :       Learning Assessment 2014   PRIMARY LEARNER Patient   HIGHEST LEVEL OF EDUCATION - PRIMARY LEARNER  GRADUATED HIGH SCHOOL OR GED   BARRIERS PRIMARY LEARNER NONE   CO-LEARNER CAREGIVER No   PRIMARY LANGUAGE ENGLISH   LEARNER PREFERENCE PRIMARY DEMONSTRATION   ANSWERED BY patient   RELATIONSHIP SELF        Depression Screening:   :       3 most recent PHQ Screens 2019   Little interest or pleasure in doing things Not at all   Feeling down, depressed, irritable, or hopeless Not at all   Total Score PHQ 2 0   Trouble falling or staying asleep, or sleeping too much -   Feeling tired or having little energy -   Poor appetite, weight loss, or overeating -   Feeling bad about yourself - or that you are a failure or have let yourself or your family down -   Trouble concentrating on things such as school, work, reading, or watching TV -   Moving or speaking so slowly that other people could have noticed; or the opposite being so fidgety that others notice -   Thoughts of being better off dead, or hurting yourself in some way -   PHQ 9 Score -        Fall Risk Assessment:   :       Fall Risk Assessment, last 12 mths 1/16/2019   Able to walk? Yes   Fall in past 12 months? No        Abuse Screening:   :       Abuse Screening Questionnaire 1/16/2019 4/16/2018 1/19/2017 10/26/2015 8/5/2014   Do you ever feel afraid of your partner? N N N N N   Are you in a relationship with someone who physically or mentally threatens you? N N N N N   Is it safe for you to go home?  Y Y Y Y Y        ADL Screening:   :       ADL Assessment 1/16/2019   Feeding yourself No Help Needed   Getting from bed to chair No Help Needed   Getting dressed No Help Needed   Bathing or showering No Help Needed   Walk across the room (includes cane/walker) No Help Needed   Using the telphone No Help Needed   Taking your medications No Help Needed   Preparing meals No Help Needed   Managing money (expenses/bills) No Help Needed   Moderately strenuous housework (laundry) No Help Needed   Shopping for personal items (toiletries/medicines) No Help Needed   Shopping for groceries No Help Needed   Driving No Help Needed   Climbing a flight of stairs No Help Needed   Getting to places beyond walking distances No Help Needed

## 2019-05-20 NOTE — PROGRESS NOTES
HISTORY OF PRESENT ILLNESS  Hever Pardo is a 80 y.o. female. HPI  She presents for follow up of hypertension, CKD, hyperlipidemia, coronary artery disease and status post coronary artery stenting. She has lost 6 lbs in past year  Diet and Lifestyle: generally follows a low fat low cholesterol diet, generally follows a low sodium diet, exercises sporadically, nonsmoker  Medication compliance: compliant all of the time  Medication side effects: none  Home BP Monitorin-150's/80's  Cardiovascular ROS:  She complains of lower extremity edema. She denies palpitations, orthopnea, exertional chest pressure/discomfort, claudication, dyspnea on exertion, dizziness     She presents for follow up of gastroesophageal reflux. .  She has had dysphagia for solids,drinks water and it goes down., denies heartburn.     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, N18.2    Plantar fasciitis M72.2    Spinal stenosis M48.00    Cervical neck pain with evidence of disc disease M50.90    GERD (gastroesophageal reflux disease) K21.9    Rotator cuff rupture M75.100    Tubular adenoma of colon D12.6    Advance directive on file Z78.9    Osteopenia with high risk of fracture M85.80    Hypercholesterolemia E78.00    Coronary artery disease involving native coronary artery of native heart with angina pectoris with documented spasm ([facility] [address] I25.111    LBBB (left bundle branch block) I44.7    Tubular adenoma of rectum D12.8     Past Medical History:   Diagnosis Date    CAD (coronary artery disease)     GERD (gastroesophageal reflux disease)     Hypercholesterolemia     Hypertension     SOB (shortness of breath) 2018    UTI (urinary tract infection) 2018     Past Surgical History:   Procedure Laterality Date    ABDOMEN SURGERY PROC UNLISTED      Laproscopic colon polyp excision Dr. Bakari Renteria  2018 PCI/JAMISON to LAD and RCA    COLONOSCOPY N/A 5/15/2019    COLONOSCOPY performed by Meghan Agustin MD at [facility]\"" ENDOSCOPY    COLONOSCOPY,REMV LESASHELY,SNARE  5/15/2019         HX APPENDECTOMY      Age 25    HX HEART CATHETERIZATION  2018    HX HEENT Bilateral     cataracts    HX ORTHOPAEDIC  1996    Lumbar laminectomy    HX PTCA      VT COLSC FLX W/RMVL OF TUMOR POLYP LESION SNARE TQ  2014          Social History     Socioeconomic History    Marital status:      Spouse name: Not on file    Number of children: Not on file    Years of education: Not on file    Highest education level: Not on file   Tobacco Use    Smoking status: Former Smoker     Last attempt to quit: 1982     Years since quittin.1    Smokeless tobacco: Never Used   Substance and Sexual Activity    Alcohol use: No    Drug use: No     Family History   Problem Relation Age of Onset    Heart Disease Mother     Stroke Mother     Cancer Father     Cancer Brother     Heart Disease Brother     Cancer Brother      Allergies   Allergen Reactions    Contrast Agent [Iodine] Hives    Penicillins Hives    Bystolic [Nebivolol] Other (comments)     abd pain    Cardizem [Diltiazem Hcl] Rash    Cardura [Doxazosin] Unknown (comments)    Codeine Nausea Only    Cymbalta [Duloxetine] Other (comments)     Abdominal pain, anxiety    Gabapentin Other (comments)     Made her feel crazy    Prinivil [Lisinopril] Unknown (comments)    Tekturna [Aliskiren] Other (comments)     abd cramps    Tenormin [Atenolol] Unknown (comments)     Current Outpatient Medications   Medication Sig Dispense Refill    atorvastatin (LIPITOR) 40 mg tablet Take 1 Tab by mouth daily. 90 Tab 3    metoprolol succinate (TOPROL-XL) 25 mg XL tablet Take 0.5 Tabs by mouth daily. 45 Tab 3    losartan (COZAAR) 100 mg tablet TAKE ONE TABLET BY MOUTH ONCE DAILY 90 Tab 3    amLODIPine (NORVASC) 5 mg tablet Take 1 Tab by mouth daily.  90 Tab 3  furosemide (LASIX) 20 mg tablet Take 1 Tab by mouth daily. Reduced 7/17/2018 90 Tab 3    raNITIdine (ZANTAC) 150 mg tablet Take 150 mg by mouth as needed.  aspirin 81 mg chewable tablet Take 1 Tab by mouth daily. 30 Tab 0    acetaminophen (TYLENOL ARTHRITIS PAIN) 650 mg TbER Take 650 mg by mouth every eight (8) hours.  cloNIDine HCl (CATAPRES) 0.1 mg tablet Take 1 Tab by mouth two (2) times a day. Indications: hypertension 180 Tab 3    cholecalciferol (VITAMIN D3) 1,000 unit cap Take  by mouth daily.  magnesium oxide (MAG-OX) 400 mg tablet Take 1 Tab by mouth daily. (Patient taking differently: Take 400 mg by mouth as needed.) 30 Tab 12    polyethylene glycol (MIRALAX) 17 gram/dose powder Take 17 g by mouth daily.  clopidogrel (PLAVIX) 75 mg tab   2       Review of Systems   Constitutional: Negative for malaise/fatigue and weight loss. Gastrointestinal: Positive for constipation (Miralax). Negative for diarrhea. Musculoskeletal: Positive for back pain. Negative for joint pain. Neurological: Negative for tingling and focal weakness. Visit Vitals  /89 (BP 1 Location: Left arm, BP Patient Position: Sitting)   Pulse 62   Temp 97.9 °F (36.6 °C) (Oral)   Resp 12   Ht 5' 4\" (1.626 m)   Wt 160 lb 8 oz (72.8 kg)   SpO2 97%   BMI 27.55 kg/m²     Physical Exam   Constitutional: She is oriented to person, place, and time. She appears well-developed and well-nourished. HENT:   Head: Normocephalic and atraumatic. Eyes: Pupils are equal, round, and reactive to light. Conjunctivae are normal.   Neck: Neck supple. Carotid bruit is not present. No thyromegaly present. Cardiovascular: Normal rate, regular rhythm and normal heart sounds. PMI is not displaced. Exam reveals no gallop. No murmur heard. Pulses:       Dorsalis pedis pulses are 2+ on the right side, and 2+ on the left side. Posterior tibial pulses are 2+ on the right side, and 2+ on the left side.    Pulmonary/Chest: Effort normal. She has no wheezes. She has no rhonchi. She has no rales. Abdominal: Soft. Normal appearance. She exhibits no abdominal bruit and no mass. There is no hepatosplenomegaly. There is no tenderness. Musculoskeletal: She exhibits no edema. Lymphadenopathy:     She has no cervical adenopathy. Right: No supraclavicular adenopathy present. Left: No supraclavicular adenopathy present. Neurological: She is alert and oriented to person, place, and time. No sensory deficit. Skin: Skin is warm, dry and intact. No rash noted. Psychiatric: She has a normal mood and affect. Her behavior is normal.   Nursing note and vitals reviewed. Lab Results   Component Value Date/Time    Cholesterol, total 120 04/19/2019 07:49 AM    HDL Cholesterol 36 (L) 04/19/2019 07:49 AM    LDL, calculated 64 04/19/2019 07:49 AM    VLDL, calculated 20 04/19/2019 07:49 AM    Triglyceride 99 04/19/2019 07:49 AM     Lab Results   Component Value Date/Time    Sodium 142 04/19/2019 07:49 AM    Potassium 4.2 04/19/2019 07:49 AM    Chloride 103 04/19/2019 07:49 AM    CO2 24 04/19/2019 07:49 AM    Anion gap 7 06/14/2018 10:10 AM    Glucose 90 04/19/2019 07:49 AM    BUN 13 04/19/2019 07:49 AM    Creatinine 0.86 04/19/2019 07:49 AM    BUN/Creatinine ratio 15 04/19/2019 07:49 AM    GFR est AA 71 04/19/2019 07:49 AM    GFR est non-AA 62 04/19/2019 07:49 AM    Calcium 9.8 04/19/2019 07:49 AM    Bilirubin, total 0.4 04/19/2019 07:49 AM    AST (SGOT) 21 04/19/2019 07:49 AM    Alk. phosphatase 99 04/19/2019 07:49 AM    Protein, total 6.8 04/19/2019 07:49 AM    Albumin 4.3 04/19/2019 07:49 AM    Globulin 3.7 06/12/2018 11:50 AM    A-G Ratio 1.7 04/19/2019 07:49 AM    ALT (SGPT) 15 04/19/2019 07:49 AM       ASSESSMENT and PLAN    ICD-10-CM ICD-9-CM    1. Medicare annual wellness visit, subsequent Z00.00 V70.0    2. Advance directive discussed with patient Z71.89 V65.49    3.  Benign hypertension with chronic kidney disease, 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 403.10     N18.2 585.2    4. Coronary artery disease involving native coronary artery of native heart with angina pectoris with documented spasm ([facility] [address] I25.111 414.01      413.9    5. Hypercholesterolemia E78.00 272.0    6. Gastroesophageal reflux disease without esophagitis K21.9 530.81    7. Postmenopausal estrogen deficiency Z78.0 V49.81 DEXA BONE DENSITY STUDY AXIAL   8. Overweight (BMI 25.0-29. 9) E66.3 278.02      Diagnoses and all orders for this visit:    1. Medicare annual wellness visit, subsequent    2. Advance directive discussed with patient    3. Benign hypertension with chronic kidney disease, stage II  Blood pressure is adequately controlled and creatinine is stable. 4. Coronary artery disease involving native coronary artery of native heart with angina pectoris with documented spasm ([facility] [address]  Stable taking antiplatelet agent and statin. 5. Hypercholesterolemia  Hyperlipidemia is controlled    6. Gastroesophageal reflux disease without esophagitis  Symptoms controlled. 7. Postmenopausal estrogen deficiency  -     DEXA BONE DENSITY STUDY AXIAL; Future    8. Overweight (BMI 25.0-29. 9)  Discussed using smaller plate for portion control, keeping a food diary for awareness of food consumed, checking weight often, and increasing physical activity. Will re-evaluate next visit. Follow-up and Dispositions    · Return in about 6 months (around 11/20/2019) for HTN, CKD, chol, CAD.       lab results and schedule of future lab studies reviewed with patient  reviewed diet, exercise and weight control  I have discussed the diagnosis, evaluation and treatment options and the intended plan with the patient. Patient understands and is in agreement. The patient has received an after-visit summary and questions were answered concerning future plans. I have discussed side effects and warnings of any new medications with the patient as well.

## 2019-05-20 NOTE — PATIENT INSTRUCTIONS
Office visit in 6 months The best way to stay healthy is to live a healthy lifestyle. A healthy lifestyle includes regular exercise, eating a well-balanced diet, keeping a healthy weight and not smoking. Regular physical exams and screening tests are another important way to take care of yourself. Preventive exams provided by health care providers can find health problems early when treatment works best and can keep you from getting certain diseases or illnesses. Preventive services include exams, lab tests, screenings, shots, monitoring and information to help you take care of your own health. All people over 65 should have a pneumonia shot. Pneumonia shots are usually only needed once in a lifetime unless your doctor decides differently. In addition to your physical exam, some screening tests are recommended: 
 
All people over 65 should have a yearly flu shot. People over 65 are at medium to high risk for Hepatitis B. Three shots are needed for complete protection. Bone mass measurement (dexa scan) is recommended every two years. Diabetes Mellitus screening is recommended every year. Glaucoma is an eye disease caused by high pressure in the eye. An eye exam is recommended every year. Cardiovascular screening tests that check your cholesterol and other blood fat (lipid) levels are recommended every five years. Colorectal Cancer screening tests help to find pre-cancerous polyps (growths in the colon) so they can be removed before they turn into cancer. Tests ordered for screening depend on your personal and family history risk factors. Prostate Cancer Screening (annually up to age 76) Screening for breast cancer is recommended yearly with a Mammogram. 
 
Screening for cervical and vaginal cancer is recommended with a pelvic and Pap test every two years.  However if you have had an abnormal pap in the past  three years or at high risk for cervical or vaginal cancer Medicare will cover a pap test and a pelvic exam every year. Here is a list of your current Health Maintenance items with a due date: 
Health Maintenance Due Topic Date Due  Shingles Vaccine (1 of 2) [birth year]1984 [address] [place] [address] Annual Well Visit  04/17/2019 Well Visit, Over 72: Care Instructions Your Care Instructions Physical exams can help you stay healthy. Your doctor has checked your overall health and may have suggested ways to take good care of yourself. He or she also may have recommended tests. At home, you can help prevent illness with healthy eating, regular exercise, and other steps. Follow-up care is a key part of your treatment and safety. Be sure to make and go to all appointments, and call your doctor if you are having problems. It's also a good idea to know your test results and keep a list of the medicines you take. How can you care for yourself at home? · Reach and stay at a healthy weight. This will lower your risk for many problems, such as obesity, diabetes, heart disease, and high blood pressure. · Get at least 30 minutes of exercise on most days of the week. Walking is a good choice. You also may want to do other activities, such as running, swimming, cycling, or playing tennis or team sports. · Do not smoke. Smoking can make health problems worse. If you need help quitting, talk to your doctor about stop-smoking programs and medicines. These can increase your chances of quitting for good. · Protect your skin from too much sun. When you're outdoors from 10 a.m. to 4 p.m., stay in the shade or cover up with clothing and a hat with a wide brim. Wear sunglasses that block UV rays. Even when it's cloudy, put broad-spectrum sunscreen (SPF 30 or higher) on any exposed skin. · See a dentist one or two times a year for checkups and to have your teeth cleaned. · Wear a seat belt in the car. · Limit alcohol to 2 drinks a day for men and 1 drink a day for women. Too much alcohol can cause health problems. Follow your doctor's advice about when to have certain tests. These tests can spot problems early. For men and women · Cholesterol. Your doctor will tell you how often to have this done based on your overall health and other things that can increase your risk for heart attack and stroke. · Blood pressure. Have your blood pressure checked during a routine doctor visit. Your doctor will tell you how often to check your blood pressure based on your age, your blood pressure results, and other factors. · Diabetes. Ask your doctor whether you should have tests for diabetes. · Vision. Experts recommend that you have yearly exams for glaucoma and other age-related eye problems. · Hearing. Tell your doctor if you notice any change in your hearing. You can have tests to find out how well you hear. · Colon cancer tests. Keep having colon cancer tests as your doctor recommends. You can have one of several types of tests. · Heart attack and stroke risk. At least every 4 to 6 years, you should have your risk for heart attack and stroke assessed. Your doctor uses factors such as your age, blood pressure, cholesterol, and whether you smoke or have diabetes to show what your risk for a heart attack or stroke is over the next 10 years. · Osteoporosis. Talk to your doctor about whether you should have a bone density test to find out whether you have thinning bones. Also ask your doctor about whether you should take calcium and vitamin D supplements. For women · Pap test and pelvic exam. You may no longer need a Pap test. Talk with your doctor about whether to stop or continue to have Pap tests. · Breast exam and mammogram. Ask how often you should have a mammogram, which is an X-ray of your breasts. A mammogram can spot breast cancer before it can be felt and when it is easiest to treat. · Thyroid disease. Talk to your doctor about whether to have your thyroid checked as part of a regular physical exam. Women have an increased chance of a thyroid problem. For men · Prostate exam. Talk to your doctor about whether you should have a blood test (called a PSA test) for prostate cancer. Experts disagree on whether men should have this test. Some experts recommend that you discuss the benefits and risks of the test with your doctor. · Abdominal aortic aneurysm. Ask your doctor whether you should have a test to check for an aneurysm. You may need a test if you ever smoked or if your parent, brother, sister, or child has had an aneurysm. When should you call for help? Watch closely for changes in your health, and be sure to contact your doctor if you have any problems or symptoms that concern you. Where can you learn more? Go to http://hua-skip.info/. Enter A880 in the search box to learn more about \"Well Visit, Over 65: Care Instructions. \" Current as of: March 28, 2018 Content Version: 11.9 © 7997-6634 [employer]. Care instructions adapted under license by [employer] (which disclaims liability or warranty for this information). If you have questions about a medical condition or this instruction, always ask your healthcare professional. [address] any warranty or liability for your use of this information.

## 2019-05-20 NOTE — PROGRESS NOTES
This is the Subsequent Medicare Annual Wellness Exam, performed 12 months or more after the Initial AWV or the last Subsequent AWV    I have reviewed the patient's medical history in detail and updated the computerized patient record. History     Past Medical History:   Diagnosis Date    CAD (coronary artery disease)     GERD (gastroesophageal reflux disease)     Hypercholesterolemia     Hypertension     SOB (shortness of breath) 6/14/2018    UTI (urinary tract infection) 7/5/2018      Past Surgical History:   Procedure Laterality Date    ABDOMEN SURGERY PROC UNLISTED  2005    Laproscopic colon polyp excision Dr. Steve Stone  07/05/2018    PCI/JAMISON to LAD and RCA    COLONOSCOPY N/A 5/15/2019    COLONOSCOPY performed by Cyndi Villalta [facility] at [facility] ENDOSCOPY    COLONOSCOPY,REMV LESN,SNARE  5/15/2019         HX APPENDECTOMY      Age 25    HX HEART CATHETERIZATION  07/05/2018    HX HEENT Bilateral     cataracts    HX ORTHOPAEDIC  1996    Lumbar laminectomy    HX PTCA      [place] COLSC FLX W/RMVL OF TUMOR POLYP LESION SNARE TQ  7/18/2014          Current Outpatient Medications   Medication Sig Dispense Refill    atorvastatin (LIPITOR) 40 mg tablet Take 1 Tab by mouth daily. 90 Tab 3    metoprolol succinate (TOPROL-XL) 25 mg XL tablet Take 0.5 Tabs by mouth daily. 45 Tab 3    losartan (COZAAR) 100 mg tablet TAKE ONE TABLET BY MOUTH ONCE DAILY 90 Tab 3    amLODIPine (NORVASC) 5 mg tablet Take 1 Tab by mouth daily. 90 Tab 3    furosemide (LASIX) 20 mg tablet Take 1 Tab by mouth daily. Reduced 7/17/2018 90 Tab 3    raNITIdine (ZANTAC) 150 mg tablet Take 150 mg by mouth as needed.  aspirin 81 mg chewable tablet Take 1 Tab by mouth daily. 30 Tab 0    acetaminophen (TYLENOL ARTHRITIS PAIN) 650 mg TbER Take 650 mg by mouth every eight (8) hours.  cloNIDine HCl (CATAPRES) 0.1 mg tablet Take 1 Tab by mouth two (2) times a day.  Indications: hypertension 180 Tab 3    cholecalciferol (VITAMIN D3) 1,000 unit cap Take  by mouth daily.  magnesium oxide (MAG-OX) 400 mg tablet Take 1 Tab by mouth daily. (Patient taking differently: Take 400 mg by mouth as needed.) 30 Tab 12    polyethylene glycol (MIRALAX) 17 gram/dose powder Take 17 g by mouth daily.       clopidogrel (PLAVIX) 75 mg tab   2     Allergies   Allergen Reactions    Contrast Agent [Iodine] Hives    Penicillins Hives    Bystolic [Nebivolol] Other (comments)     abd pain    Cardizem [Diltiazem Hcl] Rash    Cardura [Doxazosin] Unknown (comments)    Codeine Nausea Only    Cymbalta [Duloxetine] Other (comments)     Abdominal pain, anxiety    Gabapentin Other (comments)     Made her feel crazy    Prinivil [Lisinopril] Unknown (comments)    Tekturna [Aliskiren] Other (comments)     abd cramps    Tenormin [Atenolol] Unknown (comments)     Family History   Problem Relation Age of Onset    Heart Disease Mother     Stroke Mother     Cancer Father     Cancer Brother     Heart Disease Brother     Cancer Brother      Social History     Tobacco Use    Smoking status: Former Smoker     Last attempt to quit: 1982     Years since quittin.1    Smokeless tobacco: Never Used   Substance Use Topics    Alcohol use: No     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, 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    Plantar fasciitis M72.2    Spinal stenosis M48.00    Cervical neck pain with evidence of disc disease M50.90    GERD (gastroesophageal reflux disease) K21.9    Rotator cuff rupture M75.100    Tubular adenoma of colon D12.6    Advance directive on file Z78.9    Osteopenia with high risk of fracture M85.80    Hypercholesterolemia E78.00    Coronary artery disease involving native coronary artery of native heart with angina pectoris with documented spasm (HCC) I25.111    LBBB (left bundle branch block) I44.7    Tubular adenoma of rectum D12.8 Depression Risk Factor Screening:     3 most recent PHQ Screens 1/16/2019   Little interest or pleasure in doing things Not at all   Feeling down, depressed, irritable, or hopeless Not at all   Total Score PHQ 2 0   Trouble falling or staying asleep, or sleeping too much -   Feeling tired or having little energy -   Poor appetite, weight loss, or overeating -   Feeling bad about yourself - or that you are a failure or have let yourself or your family down -   Trouble concentrating on things such as school, work, reading, or watching TV -   Moving or speaking so slowly that other people could have noticed; or the opposite being so fidgety that others notice -   Thoughts of being better off dead, or hurting yourself in some way -   PHQ 9 Score -     Alcohol Risk Factor Screening: You do not drink alcohol or very rarely. Functional Ability and Level of Safety:   Hearing Loss  Hearing is good. Activities of Daily Living  The home contains: handrails and grab bars  Patient does total self care    Fall Risk  Fall Risk Assessment, last 12 mths 1/16/2019   Able to walk? Yes   Fall in past 12 months?  No       Abuse Screen  Patient is not abused    Cognitive Screening   Evaluation of Cognitive Function:  Has your family/caregiver stated any concerns about your memory: no  Normal    Patient Care Team   Patient Care Team:  Kylee Vallejo MD as PCP - General (Internal Medicine)  Liz Choi MD (Pain Management)  Pablo Denton MD (Ophthalmology)  Kayla Bose MD as Physician (Cardiology)    Assessment/Plan   Education and counseling provided:  Are appropriate based on today's review and evaluation        Health Maintenance Due   Topic Date Due    Shingrix Vaccine Age 49> (1 of 2) 03/15/1984    MEDICARE YEARLY EXAM  04/17/2019

## 2019-06-08 LAB
ALBUMIN SERPL-MCNC: 4.7 G/DL (ref 3.5–4.7)
ALBUMIN/GLOB SERPL: 1.7 {RATIO} (ref 1.2–2.2)
ALP SERPL-CCNC: 112 IU/L (ref 39–117)
ALT SERPL-CCNC: 17 IU/L (ref 0–32)
AST SERPL-CCNC: 23 IU/L (ref 0–40)
BILIRUB SERPL-MCNC: 0.5 MG/DL (ref 0–1.2)
BUN SERPL-MCNC: 16 MG/DL (ref 8–27)
BUN/CREAT SERPL: 17 (ref 12–28)
CALCIUM SERPL-MCNC: 9.8 MG/DL (ref 8.7–10.3)
CHLORIDE SERPL-SCNC: 105 MMOL/L (ref 96–106)
CHOLEST SERPL-MCNC: 128 MG/DL (ref 100–199)
CK SERPL-CCNC: 38 U/L (ref 24–173)
CO2 SERPL-SCNC: 24 MMOL/L (ref 20–29)
CREAT SERPL-MCNC: 0.93 MG/DL (ref 0.57–1)
GLOBULIN SER CALC-MCNC: 2.7 G/DL (ref 1.5–4.5)
GLUCOSE SERPL-MCNC: 100 MG/DL (ref 65–99)
HDLC SERPL-MCNC: 40 MG/DL
INTERPRETATION, 910389: NORMAL
INTERPRETATION: NORMAL
LDLC SERPL CALC-MCNC: 74 MG/DL (ref 0–99)
PDF IMAGE, 910387: NORMAL
POTASSIUM SERPL-SCNC: 4.1 MMOL/L (ref 3.5–5.2)
PROT SERPL-MCNC: 7.4 G/DL (ref 6–8.5)
SODIUM SERPL-SCNC: 143 MMOL/L (ref 134–144)
TRIGL SERPL-MCNC: 72 MG/DL (ref 0–149)
VLDLC SERPL CALC-MCNC: 14 MG/DL (ref 5–40)

## 2019-06-10 ENCOUNTER — HOSPITAL ENCOUNTER (OUTPATIENT)
Dept: MAMMOGRAPHY | Age: 84
Discharge: HOME | End: 2019-06-10
Attending: INTERNAL MEDICINE
Payer: MEDICARE

## 2019-06-10 DIAGNOSIS — Z12.39 SCREENING BREAST EXAMINATION: ICD-10-CM

## 2019-06-10 PROCEDURE — 77067 SCR MAMMO BI INCL CAD: CPT

## 2019-06-10 NOTE — PROGRESS NOTES
Xiomara Rodarte,    Please call the patient and inform her labs look good. Kidney function, electrolytes are stable. Lipids are at goal.  Keep up the good work!     Thanks,  Renetta Goff

## 2019-07-29 ENCOUNTER — OFFICE VISIT (OUTPATIENT)
Dept: CARDIOLOGY CLINIC | Age: 84
End: 2019-07-29

## 2019-07-29 VITALS
HEART RATE: 78 BPM | BODY MASS INDEX: 27.08 KG/M2 | WEIGHT: 158.6 LBS | DIASTOLIC BLOOD PRESSURE: 80 MMHG | HEIGHT: 64 IN | SYSTOLIC BLOOD PRESSURE: 140 MMHG | OXYGEN SATURATION: 96 % | RESPIRATION RATE: 16 BRPM

## 2019-07-29 DIAGNOSIS — I25.10 ASHD (ARTERIOSCLEROTIC HEART DISEASE): Primary | ICD-10-CM

## 2019-07-29 DIAGNOSIS — I44.7 LBBB (LEFT BUNDLE BRANCH BLOCK): ICD-10-CM

## 2019-07-29 DIAGNOSIS — I10 HYPERTENSION, ESSENTIAL: ICD-10-CM

## 2019-07-29 DIAGNOSIS — E78.2 MIXED HYPERLIPIDEMIA: ICD-10-CM

## 2019-07-29 NOTE — PROGRESS NOTES
[address] [facility], [place], [address]  [phone]     Subjective: Michelle Clemente is a 80 y.o. female is here for routine f/u. She still works out at a [employer], rides stationary bike for about 1.5 mile and walks in the evenings. She has changed her diet as well, cutting back on fat intake and had stopped eating red meat. Lost 5 lbs since last OV. Some unchanged ankle swelling, resolves with leg elevation. The patient denies chest pain/ shortness of breath, orthopnea, PND, palpitations, syncope, or presyncope.        Patient Active Problem List    Diagnosis Date Noted    Tubular adenoma of rectum 05/19/2019    LBBB (left bundle branch block) 07/24/2018    Coronary artery disease involving native coronary artery of native heart with angina pectoris with documented spasm ([facility] [address] 07/05/2018    Hypercholesterolemia 01/19/2017    Osteopenia with high risk of fracture 03/09/2016    Advance directive on file 11/28/2015    Tubular adenoma of colon 08/04/2014    Rotator cuff rupture 03/12/2013    GERD (gastroesophageal reflux disease) 03/14/2011    Benign hypertension with chronic kidney disease, stage II 03/01/2010    Plantar fasciitis 03/01/2010    Spinal stenosis 03/01/2010    Cervical neck pain with evidence of disc disease 03/01/2010      Alexsandra Mireles MD  Past Medical History:   Diagnosis Date    CAD (coronary artery disease)     GERD (gastroesophageal reflux disease)     Hypercholesterolemia     Hypertension     SOB (shortness of breath) 6/14/2018    UTI (urinary tract infection) 7/5/2018      Past Surgical History:   Procedure Laterality Date    ABDOMEN SURGERY PROC UNLISTED  2005    Laproscopic colon polyp excision Dr. Kosta Read  07/05/2018    PCI/JAMISON to LAD and RCA    COLONOSCOPY N/A 5/15/2019    COLONOSCOPY performed by Blanca Cooper [facility] at [facility] 5/15/2019         HX APPENDECTOMY      Age 25    HX HEART CATHETERIZATION  2018    HX HEENT Bilateral     cataracts    HX ORTHOPAEDIC  1996    Lumbar laminectomy    HX PTCA      [place] COLSC FLX W/RMVL OF TUMOR POLYP LESION SNARE TQ  2014          Allergies   Allergen Reactions    Contrast Agent [Iodine] Hives    Penicillins Hives    Bystolic [Nebivolol] Other (comments)     abd pain    Cardizem [Diltiazem Hcl] Rash    Cardura [Doxazosin] Unknown (comments)    Codeine Nausea Only    Cymbalta [Duloxetine] Other (comments)     Abdominal pain, anxiety    Gabapentin Other (comments)     Made her feel crazy    Prinivil [Lisinopril] Unknown (comments)    Tekturna [Aliskiren] Other (comments)     abd cramps    Tenormin [Atenolol] Unknown (comments)      Family History   Problem Relation Age of Onset    Heart Disease Mother     Stroke Mother     Cancer Father     Cancer Brother     Heart Disease Brother     Cancer Brother       Social History     Socioeconomic History    Marital status:      Spouse name: Not on file    Number of children: Not on file    Years of education: Not on file    Highest education level: Not on file   Occupational History    Not on file   Social Needs    Financial resource strain: Not on file    Food insecurity:     Worry: Not on file     Inability: Not on file    Transportation needs:     Medical: Not on file     Non-medical: Not on file   Tobacco Use    Smoking status: Former Smoker     Last attempt to quit: 1982     Years since quittin.3    Smokeless tobacco: Never Used   Substance and Sexual Activity    Alcohol use: No    Drug use: No    Sexual activity: Not on file   Lifestyle    Physical activity:     Days per week: Not on file     Minutes per session: Not on file    Stress: Not on file   Relationships    Social connections:     Talks on phone: Not on file     Gets together: Not on file     Attends [employer] service: Not on file Active member of club or organization: Not on file     Attends meetings of clubs or organizations: Not on file     Relationship status: Not on file    Intimate partner violence:     Fear of current or ex partner: Not on file     Emotionally abused: Not on file     Physically abused: Not on file     Forced sexual activity: Not on file   Other Topics Concern    Not on file   Social History Narrative    Not on file      Current Outpatient Medications   Medication Sig    atorvastatin (LIPITOR) 40 mg tablet Take 1 Tab by mouth daily.  metoprolol succinate (TOPROL-XL) 25 mg XL tablet Take 0.5 Tabs by mouth daily.  losartan (COZAAR) 100 mg tablet TAKE ONE TABLET BY MOUTH ONCE DAILY    amLODIPine (NORVASC) 5 mg tablet Take 1 Tab by mouth daily.  furosemide (LASIX) 20 mg tablet Take 1 Tab by mouth daily. Reduced 7/17/2018    raNITIdine (ZANTAC) 150 mg tablet Take 150 mg by mouth as needed.  aspirin 81 mg chewable tablet Take 1 Tab by mouth daily.  acetaminophen (TYLENOL ARTHRITIS PAIN) 650 mg TbER Take 650 mg by mouth as needed.  cloNIDine HCl (CATAPRES) 0.1 mg tablet Take 1 Tab by mouth two (2) times a day. Indications: hypertension    cholecalciferol (VITAMIN D3) 1,000 unit cap Take  by mouth daily.  magnesium oxide (MAG-OX) 400 mg tablet Take 1 Tab by mouth daily. (Patient taking differently: Take 400 mg by mouth as needed.)    polyethylene glycol (MIRALAX) 17 gram/dose powder Take 17 g by mouth as needed. No current facility-administered medications for this visit. Review of Symptoms:  11 systems reviewed, negative other than as stated in the HPI    Physical ExamPhysical Exam:    Vitals:    07/29/19 1032 07/29/19 1046 07/29/19 1100   BP: 140/80 150/80 140/80   Pulse: 78     Resp: 16     SpO2: 96%     Weight: 158 lb 9.6 oz (71.9 kg)     Height: 5' 4\" (1.626 m)       Body mass index is 27.22 kg/m². General PE  Gen:  NAD  Mental Status - Alert.  General Appearance - Not in acute distress. HEENT:  PERRL, no carotid bruits or JVD  Chest and Lung Exam   Inspection: Accessory muscles - No use of accessory muscles in breathing. Auscultation:   Breath sounds: - Normal.   Cardiovascular   Inspection: Jugular vein - Bilateral - Inspection Normal.   Palpation/Percussion:   Apical Impulse: - Normal.   Auscultation: Rhythm - Regular. Heart Sounds - S1 WNL and S2 WNL. No S3 or S4. Murmurs & Other Heart Sounds: Auscultation of the heart reveals - No Murmurs. Peripheral Vascular   Upper Extremity: Inspection - Bilateral - No Cyanotic nailbeds or Digital clubbing. Lower Extremity:   Palpation: Edema - Bilateral - some ankle swelling  Abdomen:   Soft, non-tender, bowel sounds are active.   Neuro: A&O times 3, CN and motor grossly WNL    Labs:   Lab Results   Component Value Date/Time    Cholesterol, total 128 06/07/2019 07:51 AM    Cholesterol, total 120 04/19/2019 07:49 AM    Cholesterol, total 156 01/04/2019 08:01 AM    Cholesterol, total 148 07/19/2017 12:00 AM    Cholesterol, total 158 07/12/2016 07:48 AM    HDL Cholesterol 40 06/07/2019 07:51 AM    HDL Cholesterol 36 (L) 04/19/2019 07:49 AM    HDL Cholesterol 42 01/04/2019 08:01 AM    HDL Cholesterol 43 07/19/2017 12:00 AM    HDL Cholesterol 45 07/12/2016 07:48 AM    LDL, calculated 74 06/07/2019 07:51 AM    LDL, calculated 64 04/19/2019 07:49 AM    LDL, calculated 93 01/04/2019 08:01 AM    LDL, calculated 82 07/19/2017 12:00 AM    LDL, calculated 88 07/12/2016 07:48 AM    Triglyceride 72 06/07/2019 07:51 AM    Triglyceride 99 04/19/2019 07:49 AM    Triglyceride 107 01/04/2019 08:01 AM    Triglyceride 115 07/19/2017 12:00 AM    Triglyceride 127 07/12/2016 07:48 AM     Lab Results   Component Value Date/Time    CK 33 06/12/2018 11:50 AM     Lab Results   Component Value Date/Time    Sodium 143 06/07/2019 07:51 AM    Potassium 4.1 06/07/2019 07:51 AM    Chloride 105 06/07/2019 07:51 AM    CO2 24 06/07/2019 07:51 AM    Anion gap 7 06/14/2018 10:10 AM    Glucose 100 (H) 06/07/2019 07:51 AM    BUN 16 06/07/2019 07:51 AM    Creatinine 0.93 06/07/2019 07:51 AM    BUN/Creatinine ratio 17 06/07/2019 07:51 AM    GFR est AA 65 06/07/2019 07:51 AM    GFR est non-AA 56 (L) 06/07/2019 07:51 AM    Calcium 9.8 06/07/2019 07:51 AM    Bilirubin, total 0.5 06/07/2019 07:51 AM    AST (SGOT) 23 06/07/2019 07:51 AM    Alk. phosphatase 112 06/07/2019 07:51 AM    Protein, total 7.4 06/07/2019 07:51 AM    Albumin 4.7 06/07/2019 07:51 AM    Globulin 3.7 06/12/2018 11:50 AM    A-G Ratio 1.7 06/07/2019 07:51 AM    ALT (SGPT) 17 06/07/2019 07:51 AM       EKG:  NSR LBB     Assessment:     Assessment:      1. ASHD (arteriosclerotic heart disease)    2. Hypertension, essential    3. Mixed hyperlipidemia    4. LBBB (left bundle branch block)        Orders Placed This Encounter    AMB POC EKG ROUTINE W/ 12 LEADS, INTER & REP     Order Specific Question:   Reason for Exam:     Answer:   ROUTINE        Plan:     Patient presents for f/u, doing well and stable from cardiac standpoint. She still works out at a [employer], rides stationary bike for about 1.5 mile and walks in the evenings. She has changed her diet as well, cutting back on fat intake and had stopped eating red meat. Lost 5 lbs since last OV. Some unchanged ankle swelling, resolves with leg elevation. Atherosclerotic heart disease/status post PTCA stenting of the RCA and LAD 7/18:   Clinically stable  Continue ASA, BB, statin  Stop Plavix     Hypertension   Controlled. Continue current medications     Hyperlipidemia:  4/19 LDL at 64. On statin. Continue current care and f/u in 6 months. Concepcion Zuluaga NP       [facility]    7/29/2019         Patient seen, examined by me personally. Plan discussed as detailed. Agree with note as outlined by  NP. I confirm findings in history and physical exam. No additional findings noted. Agree with plan as outlined above.      Jemma Page MD

## 2019-07-29 NOTE — PROGRESS NOTES
1. Have you been to the ER, urgent care clinic since your last visit? Hospitalized since your last visit? NO    2. Have you seen or consulted any other health care providers outside of the [address] [place] [address] since your last visit? Include any pap smears or colon screening.  NO    6 MONTH FOLLOW UP. NO CARDIAC C/O

## 2019-08-12 DIAGNOSIS — I10 HYPERTENSION, ESSENTIAL: ICD-10-CM

## 2019-08-13 RX ORDER — CLONIDINE HYDROCHLORIDE 0.1 MG/1
TABLET ORAL
Qty: 180 TAB | Refills: 3 | Status: SHIPPED | OUTPATIENT
Start: 2019-08-13 | End: 2020-08-04

## 2019-08-13 RX ORDER — FUROSEMIDE 20 MG/1
TABLET ORAL
Qty: 90 TAB | Refills: 3 | Status: SHIPPED | OUTPATIENT
Start: 2019-08-13 | End: 2020-08-04

## 2019-08-13 RX ORDER — AMLODIPINE BESYLATE 5 MG/1
TABLET ORAL
Qty: 90 TAB | Refills: 3 | Status: SHIPPED | OUTPATIENT
Start: 2019-08-13 | End: 2020-08-04

## 2019-08-26 ENCOUNTER — TELEPHONE (OUTPATIENT)
Dept: CARDIOLOGY CLINIC | Age: 84
End: 2019-08-26

## 2019-08-26 NOTE — TELEPHONE ENCOUNTER
Patient wants to know if it is ok for her to get into a hot tub now? She has been off plavix a month. She is going to [facility] 9/16/19.

## 2019-08-27 NOTE — TELEPHONE ENCOUNTER
Patient calling to see if ok to get in hot tub when she goes on vacation leaves in 5 days.     [phone]    Thanks  Vishnu Spence

## 2019-08-27 NOTE — TELEPHONE ENCOUNTER
Verified patient with 2 identifiers   Spoke with patient regarding hot tub  Recommendations- per Modesta Tucker NP. Voiced understanding.

## 2019-11-08 DIAGNOSIS — I25.111 CORONARY ARTERY DISEASE INVOLVING NATIVE CORONARY ARTERY OF NATIVE HEART WITH ANGINA PECTORIS WITH DOCUMENTED SPASM (HCC): ICD-10-CM

## 2019-11-08 RX ORDER — METOPROLOL SUCCINATE 25 MG/1
TABLET, EXTENDED RELEASE ORAL
Qty: 45 TAB | Refills: 3 | Status: SHIPPED | OUTPATIENT
Start: 2019-11-08 | End: 2020-11-02

## 2019-11-08 RX ORDER — LOSARTAN POTASSIUM 100 MG/1
TABLET ORAL
Qty: 90 TAB | Refills: 3 | Status: SHIPPED | OUTPATIENT
Start: 2019-11-08 | End: 2020-11-02

## 2019-12-01 NOTE — PROGRESS NOTES
HISTORY OF PRESENT ILLNESS  Michael Angelo is a 80 y.o. female. HPI  She presents for follow up of hypertension, hyperlipidemia and coronary artery disease. Diet and Lifestyle: generally follows a low fat low cholesterol diet, generally follows a low sodium diet, exercises sporadically, nonsmoker  Medication compliance: compliant all of the time  Medication side effects: none  Home BP Monitoring: rarely  Cardiovascular ROS:  She complains of lower extremity edema. She denies palpitations, orthopnea, exertional chest pressure/discomfort, claudication, dyspnea on exertion, dizziness         She presents for follow up of gastroesophageal reflux. Current symptoms include dysphagia for solid food intermittently for 6 months. Can wash it down with water. She denies heartburn. She feels tired. She thinks pain wears her out. She has cervical spine disease, but surgery was not recommended due to lack of neurological symptoms. Uses heat, ice and CBD salve.      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, N18.2    Plantar fasciitis M72.2    Spinal stenosis M48.00    Cervical neck pain with evidence of disc disease M50.90    GERD (gastroesophageal reflux disease) K21.9    Rotator cuff rupture M75.100    Tubular adenoma of colon D12.6    Osteopenia with high risk of fracture M85.80    Hypercholesterolemia E78.00    Coronary artery disease involving native coronary artery of native heart with angina pectoris with documented spasm ([address] I25.111    LBBB (left bundle branch block) I44.7    Tubular adenoma of rectum D12.8     Past Medical History:   Diagnosis Date    CAD (coronary artery disease)     GERD (gastroesophageal reflux disease)     Hypercholesterolemia     Hypertension     SOB (shortness of breath) 6/14/2018    UTI (urinary tract infection) 7/5/2018     Past Surgical History:   Procedure Laterality Date    ABDOMEN SURGERY PROC UNLISTED      Laproscopic colon polyp excision Dr. Nataliia Jones  2018    PCI/JAMISON to LAD and RCA    COLONOSCOPY N/A 5/15/2019    COLONOSCOPY performed by Lillian Vargas MD at [facility] ENDOSCOPY    COLONOSCOPY,BRITTNEEJUJU NICHOLAS,SNARE  5/15/2019         HX APPENDECTOMY      Age 25    HX HEART CATHETERIZATION  2018    HX HEENT Bilateral     cataracts    HX ORTHOPAEDIC  1996    Lumbar laminectomy    HX PTCA      WA COLSC FLX W/RMVL OF TUMOR POLYP LESION SNARE TQ  2014          Social History     Socioeconomic History    Marital status:      Spouse name: Not on file    Number of children: Not on file    Years of education: Not on file    Highest education level: Not on file   Tobacco Use    Smoking status: Former Smoker     Last attempt to quit: 1982     Years since quittin.6    Smokeless tobacco: Never Used   Substance and Sexual Activity    Alcohol use: No    Drug use: No     Family History   Problem Relation Age of Onset    Heart Disease Mother     Stroke Mother     Cancer Father     Cancer Brother     Heart Disease Brother     Cancer Brother      Allergies   Allergen Reactions    Contrast Agent [Iodine] Hives    Penicillins Hives    Bystolic [Nebivolol] Other (comments)     abd pain    Cardizem [Diltiazem Hcl] Rash    Cardura [Doxazosin] Unknown (comments)    Codeine Nausea Only    Cymbalta [Duloxetine] Other (comments)     Abdominal pain, anxiety    Gabapentin Other (comments)     Made her feel crazy    Prinivil [Lisinopril] Unknown (comments)    Tekturna [Aliskiren] Other (comments)     abd cramps    Tenormin [Atenolol] Unknown (comments)     Current Outpatient Medications   Medication Sig Dispense Refill    metoprolol succinate (TOPROL-XL) 25 mg XL tablet TAKE 1/2 (ONE-HALF) TABLET BY MOUTH ONCE DAILY 45 Tab 3    losartan (COZAAR) 100 mg tablet TAKE 1 TABLET BY MOUTH ONCE DAILY 90 Tab 3    furosemide (LASIX) 20 mg tablet TAKE 1 TABLET BY MOUTH ONCE DAILY 90 Tab 3    cloNIDine HCl (CATAPRES) 0.1 mg tablet TAKE 1 TABLET BY MOUTH TWICE DAILY 180 Tab 3    amLODIPine (NORVASC) 5 mg tablet TAKE 1 TABLET BY MOUTH ONCE DAILY 90 Tab 3    atorvastatin (LIPITOR) 40 mg tablet Take 1 Tab by mouth daily. 90 Tab 3    raNITIdine (ZANTAC) 150 mg tablet Take 150 mg by mouth as needed.  aspirin 81 mg chewable tablet Take 1 Tab by mouth daily. 30 Tab 0    acetaminophen (TYLENOL ARTHRITIS PAIN) 650 mg TbER Take 650 mg by mouth as needed.  polyethylene glycol (MIRALAX) 17 gram/dose powder Take 17 g by mouth as needed. Review of Systems   Constitutional: Positive for malaise/fatigue. Negative for weight loss. Gastrointestinal: Positive for constipation (Miralax ). Negative for diarrhea. Musculoskeletal: Positive for back pain, joint pain (right shoulder) and neck pain. Neurological: Negative for dizziness, tingling and focal weakness. Visit Vitals  /85 (BP 1 Location: Left arm, BP Patient Position: Sitting)   Pulse 76   Temp 97.9 °F (36.6 °C) (Oral)   Resp 12   Ht 5' 4\" (1.626 m)   Wt 162 lb (73.5 kg)   SpO2 96%   BMI 27.81 kg/m²     Physical Exam  Vitals signs and nursing note reviewed. Constitutional:       Appearance: Normal appearance. She is well-developed. HENT:      Head: Normocephalic and atraumatic. Eyes:      Conjunctiva/sclera: Conjunctivae normal.      Pupils: Pupils are equal, round, and reactive to light. Neck:      Musculoskeletal: Neck supple. Thyroid: No thyromegaly. Vascular: No carotid bruit. Cardiovascular:      Rate and Rhythm: Normal rate and regular rhythm. Chest Wall: PMI is not displaced. Pulses:           Dorsalis pedis pulses are 1+ on the right side and 1+ on the left side. Posterior tibial pulses are 1+ on the right side and 1+ on the left side. Heart sounds: Normal heart sounds. No murmur. No gallop. Pulmonary:      Effort: Pulmonary effort is normal.      Breath sounds: No wheezing, rhonchi or rales. Abdominal:      General: Bowel sounds are normal. There is no distension or abdominal bruit. Palpations: Abdomen is soft. There is no hepatomegaly, splenomegaly or mass. Tenderness: There is no tenderness. Musculoskeletal:      Right lower leg: No edema. Left lower leg: No edema. Lymphadenopathy:      Cervical: No cervical adenopathy. Upper Body:      Right upper body: No supraclavicular adenopathy. Left upper body: No supraclavicular adenopathy. Skin:     General: Skin is warm and dry. Findings: No rash. Neurological:      Mental Status: She is alert and oriented to person, place, and time. Sensory: No sensory deficit. Motor: Motor function is intact. Gait: Gait is intact. Psychiatric:         Attention and Perception: Attention normal.         Mood and Affect: Mood normal.         Speech: Speech normal.         Behavior: Behavior normal.       Lab Results   Component Value Date/Time    Cholesterol, total 128 06/07/2019 07:51 AM    HDL Cholesterol 40 06/07/2019 07:51 AM    LDL, calculated 74 06/07/2019 07:51 AM    VLDL, calculated 14 06/07/2019 07:51 AM    Triglyceride 72 06/07/2019 07:51 AM     Lab Results   Component Value Date/Time    Sodium 143 06/07/2019 07:51 AM    Potassium 4.1 06/07/2019 07:51 AM    Chloride 105 06/07/2019 07:51 AM    CO2 24 06/07/2019 07:51 AM    Anion gap 7 06/14/2018 10:10 AM    Glucose 100 (H) 06/07/2019 07:51 AM    BUN 16 06/07/2019 07:51 AM    Creatinine 0.93 06/07/2019 07:51 AM    BUN/Creatinine ratio 17 06/07/2019 07:51 AM    GFR est AA 65 06/07/2019 07:51 AM    GFR est non-AA 56 (L) 06/07/2019 07:51 AM    Calcium 9.8 06/07/2019 07:51 AM    Bilirubin, total 0.5 06/07/2019 07:51 AM    AST (SGOT) 23 06/07/2019 07:51 AM    Alk.  phosphatase 112 06/07/2019 07:51 AM    Protein, total 7.4 06/07/2019 07:51 AM    Albumin 4.7 06/07/2019 07:51 AM    Globulin 3.7 06/12/2018 11:50 AM    A-G Ratio 1.7 06/07/2019 07:51 AM    ALT (SGPT) 17 06/07/2019 07:51 AM       ASSESSMENT and PLAN    ICD-10-CM ICD-9-CM    1. Benign hypertension with chronic kidney disease, 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 403.10     N18.2 585.2    2. Coronary artery disease involving native coronary artery of native heart with angina pectoris with documented spasm ([facility]) I25.111 414.01      413.9    3. Hypercholesterolemia E78.00 272.0 LIPID PANEL      METABOLIC PANEL, COMPREHENSIVE   4. Gastroesophageal reflux disease without esophagitis K21.9 530.81    5. Encounter for immunization Z23 V03.89 INFLUENZA VACCINE INACTIVATED (IIV), SUBUNIT, ADJUVANTED, IM      ADMIN INFLUENZA VIRUS VAC   6. Overweight (BMI 25.0-29. 9) E66.3 278.02    7. Chronic fatigue R53.82 780.79 CBC W/O DIFF      TSH AND FREE T4     Diagnoses and all orders for this visit:    1. Benign hypertension with chronic kidney disease, stage II  Blood pressure is at goal and creatinine is stable. 2. Coronary artery disease involving native coronary artery of native heart with angina pectoris with documented spasm ([facility] [address]  Stable taking antiplatelet agent and statin. 3. Hypercholesterolemia  Hyperlipidemia is controlled  -     LIPID PANEL; Future  -     METABOLIC PANEL, COMPREHENSIVE; Future    4. Gastroesophageal reflux disease without esophagitis  Some mild intermittent (a month apart) episode of dysphagia. If occurs with increasing frequency, will need to see GI.     5. Encounter for immunization  -     INFLUENZA VACCINE INACTIVATED (IIV), SUBUNIT, ADJUVANTED, IM  -     ADMIN INFLUENZA VIRUS VAC    6. Overweight (BMI 25.0-29. 9)  Discussed using smaller plate for portion control, keeping a food diary for awareness of food consumed, checking weight often, and increasing physical activity. Will re-evaluate next visit. 7. Chronic fatigue  -     CBC W/O DIFF;  Future  -     TSH AND FREE T4; Future      Follow-up and Dispositions    · Return in about 6 months (around 6/2/2020) for HTN, chol, CAD, GERD   Fasting lab one week prior  . lab results and schedule of future lab studies reviewed with patient  reviewed diet, exercise and weight control  I have discussed the diagnosis, evaluation and treatment options and the intended plan with the patient. Patient understands and is in agreement. The patient has received an after-visit summary and questions were answered concerning future plans. I have discussed side effects and warnings of any new medications with the patient as well.

## 2019-12-02 ENCOUNTER — TELEPHONE (OUTPATIENT)
Dept: INTERNAL MEDICINE CLINIC | Facility: CLINIC | Age: 84
End: 2019-12-02

## 2019-12-02 ENCOUNTER — OFFICE VISIT (OUTPATIENT)
Dept: INTERNAL MEDICINE CLINIC | Facility: CLINIC | Age: 84
End: 2019-12-02

## 2019-12-02 VITALS
HEART RATE: 76 BPM | SYSTOLIC BLOOD PRESSURE: 135 MMHG | OXYGEN SATURATION: 96 % | TEMPERATURE: 97.9 F | WEIGHT: 162 LBS | HEIGHT: 64 IN | DIASTOLIC BLOOD PRESSURE: 85 MMHG | RESPIRATION RATE: 12 BRPM | BODY MASS INDEX: 27.66 KG/M2

## 2019-12-02 DIAGNOSIS — I25.111 CORONARY ARTERY DISEASE INVOLVING NATIVE CORONARY ARTERY OF NATIVE HEART WITH ANGINA PECTORIS WITH DOCUMENTED SPASM (HCC): ICD-10-CM

## 2019-12-02 DIAGNOSIS — R53.82 CHRONIC FATIGUE: ICD-10-CM

## 2019-12-02 DIAGNOSIS — K21.9 GASTROESOPHAGEAL REFLUX DISEASE WITHOUT ESOPHAGITIS: ICD-10-CM

## 2019-12-02 DIAGNOSIS — Z23 ENCOUNTER FOR IMMUNIZATION: ICD-10-CM

## 2019-12-02 DIAGNOSIS — E78.00 HYPERCHOLESTEROLEMIA: ICD-10-CM

## 2019-12-02 DIAGNOSIS — N18.2 BENIGN HYPERTENSION WITH CHRONIC KIDNEY DISEASE, STAGE II: Primary | ICD-10-CM

## 2019-12-02 DIAGNOSIS — E66.3 OVERWEIGHT (BMI 25.0-29.9): ICD-10-CM

## 2019-12-02 DIAGNOSIS — I12.9 BENIGN HYPERTENSION WITH CHRONIC KIDNEY DISEASE, STAGE II: Primary | ICD-10-CM

## 2019-12-02 NOTE — TELEPHONE ENCOUNTER
Pt would also like lab slip(s) mailed out to her so that she may go to a [facility] closer to her to have them drawn

## 2019-12-02 NOTE — TELEPHONE ENCOUNTER
Pt advised lab slip is attached to her AVS from today. Mailed information on CKD with hypertension. Pt has no further questions at this time.

## 2019-12-02 NOTE — PATIENT INSTRUCTIONS
Vaccine Information Statement Influenza (Flu) Vaccine (Inactivated or Recombinant): What You Need to Know Many Vaccine Information Statements are available in Japanese and other languages. See www.immunize.org/vis Hojas de información sobre vacunas están disponibles en español y en muchos otros idiomas. Visite www.immunize.org/vis 1. Why get vaccinated? Influenza vaccine can prevent influenza (flu). Flu is a contagious disease that spreads around the [facility] every year, usually between October and May. Anyone can get the flu, but it is more dangerous for some people. Infants and young children, people 72years of age and older, pregnant women, and people with certain health conditions or a weakened immune system are at greatest risk of flu complications. Pneumonia, bronchitis, sinus infections and ear infections are examples of flu-related complications. If you have a medical condition, such as heart disease, cancer or diabetes, flu can make it worse. Flu can cause fever and chills, sore throat, muscle aches, fatigue, cough, headache, and runny or stuffy nose. Some people may have vomiting and diarrhea, though this is more common in children than adults. Each year thousands of people in the [facility] die from flu, and many more are hospitalized. Flu vaccine prevents millions of illnesses and flu-related visits to the doctor each year. 2. Influenza vaccines CDC recommends everyone 10months of age and older get vaccinated every flu season. Children 6 months through 6years of age may need 2 doses during a single flu season. Everyone else needs only 1 dose each flu season. It takes about 2 weeks for protection to develop after vaccination. There are many flu viruses, and they are always changing. Each year a new flu vaccine is made to protect against three or four viruses that are likely to cause disease in the upcoming flu season.  Even when the vaccine doesnt exactly match these viruses, it may still provide some protection. Influenza vaccine does not cause flu. Influenza vaccine may be given at the same time as other vaccines. 3. Talk with your health care provider Tell your vaccine provider if the person getting the vaccine: 
 Has had an allergic reaction after a previous dose of influenza vaccine, or has any severe, life-threatening allergies.  Has ever had Guillain-Barré Syndrome (also called GBS). In some cases, your health care provider may decide to postpone influenza vaccination to a future visit. People with minor illnesses, such as a cold, may be vaccinated. People who are moderately or severely ill should usually wait until they recover before getting influenza vaccine. Your health care provider can give you more information. 4. Risks of a reaction  Soreness, redness, and swelling where shot is given, fever, muscle aches, and headache can happen after influenza vaccine.  There may be a very small increased risk of Guillain-Barré Syndrome (GBS) after inactivated influenza vaccine (the flu shot). Umu Castaneda children who get the flu shot along with pneumococcal vaccine (PCV13), and/or DTaP vaccine at the same time might be slightly more likely to have a seizure caused by fever. Tell your health care provider if a child who is getting flu vaccine has ever had a seizure. People sometimes faint after medical procedures, including vaccination. Tell your provider if you feel dizzy or have vision changes or ringing in the ears. As with any medicine, there is a very remote chance of a vaccine causing a severe allergic reaction, other serious injury, or death. 5. What if there is a serious problem? An allergic reaction could occur after the vaccinated person leaves the clinic.  If you see signs of a severe allergic reaction (hives, swelling of the face and throat, difficulty breathing, a fast heartbeat, dizziness, or weakness), call 9-1-1 and get the person to the nearest hospital. 
 
For other signs that concern you, call your health care provider. Adverse reactions should be reported to the Vaccine Adverse Event Reporting System (VAERS). Your health care provider will usually file this report, or you can do it yourself. Visit the VAERS website at www.vaers. hhs.gov or call 4-331.787.8600. VAERS is only for reporting reactions, and VAERS staff do not give medical advice. 6. The National Vaccine Injury Compensation Program 
 
The [facility] Vaccine Injury Compensation Program (VICP) is a federal program that was created to compensate people who may have been injured by certain vaccines. Visit the VICP website at www.[facility].gov/vaccinecompensation or call 2-864.716.5636 to learn about the program and about filing a claim. There is a time limit to file a claim for compensation. 7. How can I learn more?  Ask your health care provider.  Call your local or state health department.  Contact the Centers for Disease Control and Prevention (CDC): 
- Call 7-691.642.3248 (3-616-RXU-INFO) or 
- Visit CDCs influenza website at www.cdc.gov/flu Vaccine Information Statement (Interim) Inactivated Influenza Vaccine 8/15/2019 
42 NORAH Laboy 980TG-88 Department of Health and [place] Centers for Disease Control and Prevention Office Use Only

## 2019-12-02 NOTE — PROGRESS NOTES
Idalia Abreu  Identified pt with two pt identifiers(name and ). Chief Complaint   Patient presents with    Hypertension    Cholesterol Problem    Chronic Kidney Disease    Coronary Artery Disease    Immunization/Injection       Reviewed record In preparation for visit and have obtained necessary documentation. Advanced directive / living will on file. 1. Have you been to the ER, urgent care clinic or hospitalized since your last visit? No     2. Have you seen or consulted any other health care providers outside of the [address] [place] [address] since your last visit? Include any pap smears or colon screening. Mammogram, Dr Nicholas Mendoza reviewed with provider. After verbal order read back of , patient received High Dose Flu Shot (Adjuvanted Fluad) in left arm. [address]ra [address] 47 42621-086-49 Lot [id number] Exp 20. Patient tolerated procedure without complaints and received VIS.       Health Maintenance reviewed:     Health Maintenance Due   Topic    Shingrix Vaccine Age 49> (1 of 2)    Influenza Age 5 to Adult           Wt Readings from Last 3 Encounters:   19 162 lb (73.5 kg)   19 158 lb 9.6 oz (71.9 kg)   19 160 lb 8 oz (72.8 kg)        Temp Readings from Last 3 Encounters:   19 97.9 °F (36.6 °C) (Oral)   19 97.9 °F (36.6 °C) (Oral)   05/15/19 97.7 °F (36.5 °C)        BP Readings from Last 3 Encounters:   19 135/85   19 140/80   19 142/89        Pulse Readings from Last 3 Encounters:   19 76   19 78   19 62        Vitals:    19 1036   BP: 135/85   Pulse: 76   Resp: 12   Temp: 97.9 °F (36.6 °C)   TempSrc: Oral   SpO2: 96%   Weight: 162 lb (73.5 kg)   Height: 5' 4\" (1.626 m)   PainSc:   8   PainLoc: Generalized          Learning Assessment:   :       Learning Assessment 2014   PRIMARY LEARNER Patient   HIGHEST LEVEL OF EDUCATION - PRIMARY LEARNER  GRADUATED HIGH SCHOOL OR GED   BARRIERS PRIMARY LEARNER NONE CO-LEARNER CAREGIVER No   PRIMARY LANGUAGE ENGLISH   LEARNER PREFERENCE PRIMARY DEMONSTRATION   ANSWERED BY patient   RELATIONSHIP SELF        Depression Screening:   :       3 most recent PHQ Screens 7/29/2019   Little interest or pleasure in doing things Not at all   Feeling down, depressed, irritable, or hopeless Not at all   Total Score PHQ 2 0   Trouble falling or staying asleep, or sleeping too much -   Feeling tired or having little energy -   Poor appetite, weight loss, or overeating -   Feeling bad about yourself - or that you are a failure or have let yourself or your family down -   Trouble concentrating on things such as school, work, reading, or watching TV -   Moving or speaking so slowly that other people could have noticed; or the opposite being so fidgety that others notice -   Thoughts of being better off dead, or hurting yourself in some way -   PHQ 9 Score -        Fall Risk Assessment:   :       Fall Risk Assessment, last 12 mths 7/29/2019   Able to walk? Yes   Fall in past 12 months? Yes   Fall with injury? No   Number of falls in past 12 months 1   Fall Risk Score 1        Abuse Screening:   :       Abuse Screening Questionnaire 1/16/2019 4/16/2018 1/19/2017 10/26/2015 8/5/2014   Do you ever feel afraid of your partner? N N N N N   Are you in a relationship with someone who physically or mentally threatens you? N N N N N   Is it safe for you to go home?  Y Y Y Y Y        ADL Screening:   :       ADL Assessment 1/16/2019   Feeding yourself No Help Needed   Getting from bed to chair No Help Needed   Getting dressed No Help Needed   Bathing or showering No Help Needed   Walk across the room (includes cane/walker) No Help Needed   Using the telphone No Help Needed   Taking your medications No Help Needed   Preparing meals No Help Needed   Managing money (expenses/bills) No Help Needed   Moderately strenuous housework (laundry) No Help Needed   Shopping for personal items (toiletries/medicines) No Help Needed   Shopping for groceries No Help Needed   Driving No Help Needed   Climbing a flight of stairs No Help Needed   Getting to places beyond walking distances No Help Needed

## 2019-12-02 NOTE — TELEPHONE ENCOUNTER
When leaving today's visit, pt noticed on her AVS that one of her conditions is listed as \"chronic kidney disease\". She stated that this is in error and that she does not have a history of this condition. She had to leave, but wanted to speak with the nurse about having this changed. I let her know I would send a message to the nurse and have someone give her a call back at  a message if unavailable.

## 2019-12-20 LAB
ALBUMIN SERPL-MCNC: 4.3 G/DL (ref 3.5–4.7)
ALBUMIN/GLOB SERPL: 1.7 {RATIO} (ref 1.2–2.2)
ALP SERPL-CCNC: 114 IU/L (ref 39–117)
ALT SERPL-CCNC: 20 IU/L (ref 0–32)
AST SERPL-CCNC: 22 IU/L (ref 0–40)
BILIRUB SERPL-MCNC: 0.4 MG/DL (ref 0–1.2)
BUN SERPL-MCNC: 16 MG/DL (ref 8–27)
BUN/CREAT SERPL: 19 (ref 12–28)
CALCIUM SERPL-MCNC: 9.9 MG/DL (ref 8.7–10.3)
CHLORIDE SERPL-SCNC: 105 MMOL/L (ref 96–106)
CHOLEST SERPL-MCNC: 134 MG/DL (ref 100–199)
CO2 SERPL-SCNC: 24 MMOL/L (ref 20–29)
CREAT SERPL-MCNC: 0.85 MG/DL (ref 0.57–1)
ERYTHROCYTE [DISTWIDTH] IN BLOOD BY AUTOMATED COUNT: 13.2 % (ref 12.3–15.4)
GLOBULIN SER CALC-MCNC: 2.6 G/DL (ref 1.5–4.5)
GLUCOSE SERPL-MCNC: 98 MG/DL (ref 65–99)
HCT VFR BLD AUTO: 41.7 % (ref 34–46.6)
HDLC SERPL-MCNC: 43 MG/DL
HGB BLD-MCNC: 13.6 G/DL (ref 11.1–15.9)
LDLC SERPL CALC-MCNC: 72 MG/DL (ref 0–99)
MCH RBC QN AUTO: 29.4 PG (ref 26.6–33)
MCHC RBC AUTO-ENTMCNC: 32.6 G/DL (ref 31.5–35.7)
MCV RBC AUTO: 90 FL (ref 79–97)
PLATELET # BLD AUTO: 229 X10E3/UL (ref 150–450)
POTASSIUM SERPL-SCNC: 4.3 MMOL/L (ref 3.5–5.2)
PROT SERPL-MCNC: 6.9 G/DL (ref 6–8.5)
RBC # BLD AUTO: 4.63 X10E6/UL (ref 3.77–5.28)
SODIUM SERPL-SCNC: 142 MMOL/L (ref 134–144)
T4 FREE SERPL-MCNC: 1.01 NG/DL (ref 0.82–1.77)
TRIGL SERPL-MCNC: 95 MG/DL (ref 0–149)
TSH SERPL DL<=0.005 MIU/L-ACNC: 7.69 UIU/ML (ref 0.45–4.5)
VLDLC SERPL CALC-MCNC: 19 MG/DL (ref 5–40)
WBC # BLD AUTO: 3.9 X10E3/UL (ref 3.4–10.8)

## 2019-12-26 ENCOUNTER — TELEPHONE (OUTPATIENT)
Dept: INTERNAL MEDICINE CLINIC | Facility: CLINIC | Age: 84
End: 2019-12-26

## 2020-01-06 ENCOUNTER — TELEPHONE (OUTPATIENT)
Dept: INTERNAL MEDICINE CLINIC | Facility: CLINIC | Age: 85
End: 2020-01-06

## 2020-01-06 NOTE — TELEPHONE ENCOUNTER
Per NP Elijah Phelan, I called pt to let her know she will not be able to have her thyroid labs checked at her next appt on 1/10/20 b/c it has not been 3 months since her last check. LVM. This is the 2nd voicemail that I have left at her home number. I also called her EC (pt's daughter-Peyton Simmons), but received an error message at that number. Pt will not be able to have another thyroid check until April.

## 2020-01-08 ENCOUNTER — TELEPHONE (OUTPATIENT)
Dept: INTERNAL MEDICINE CLINIC | Facility: CLINIC | Age: 85
End: 2020-01-08

## 2020-01-08 NOTE — TELEPHONE ENCOUNTER
Called pt twice today to r/s her appt for 1/10/20. She needs to be r/s with Dr. Nohemi Reyes per ALONDRA Jackson. LVM.

## 2020-01-09 ENCOUNTER — TELEPHONE (OUTPATIENT)
Dept: INTERNAL MEDICINE CLINIC | Facility: CLINIC | Age: 85
End: 2020-01-09

## 2020-01-09 NOTE — TELEPHONE ENCOUNTER
Per Yodit Hall notes below, pt was returning multiple missed calls. Called pt back and had to [facility] again.    ----- Message from Augusta Lozano sent at 1/9/2020 12:22 PM EST -----  Regarding: Dr. Patrciia Linares first and last name and relationship (if not the patient): n/a    Best contact number(s): [phone]    Whose call is being returned: n/a    Details to clarify the request: Pt returning missed calls. Requesting bloodwork results. Okay to leave message per patient.

## 2020-01-09 NOTE — TELEPHONE ENCOUNTER
Pt called to request a call back to discuss her most recent lab results at [phone]. Pt was originally scheduled to f/u with [facility]. Per [facility], the pt does not need to have her thyroid checked again so soon. This appt was cx

## 2020-01-09 NOTE — TELEPHONE ENCOUNTER
Called pt again today to discuss need to r/s appt, LVM notifying pt that the appt had been cx and requesting a call back to r/s.    ----- Message from Kerry Pepper NP sent at 1/9/2020  3:20 PM EST -----  Regarding: call pt back  Can we please call pt back- I see you tried at lunch and leave a VM cancelling her appt for tomorrow if doesn't answer as too soon to repeat blood work.  thx

## 2020-02-04 ENCOUNTER — OFFICE VISIT (OUTPATIENT)
Dept: CARDIOLOGY CLINIC | Age: 85
End: 2020-02-04

## 2020-02-04 VITALS
BODY MASS INDEX: 27.49 KG/M2 | HEART RATE: 63 BPM | RESPIRATION RATE: 16 BRPM | WEIGHT: 161 LBS | OXYGEN SATURATION: 97 % | DIASTOLIC BLOOD PRESSURE: 70 MMHG | SYSTOLIC BLOOD PRESSURE: 122 MMHG | HEIGHT: 64 IN

## 2020-02-04 DIAGNOSIS — I44.7 LBBB (LEFT BUNDLE BRANCH BLOCK): ICD-10-CM

## 2020-02-04 DIAGNOSIS — I10 HYPERTENSION, ESSENTIAL: ICD-10-CM

## 2020-02-04 DIAGNOSIS — E78.2 MIXED HYPERLIPIDEMIA: ICD-10-CM

## 2020-02-04 DIAGNOSIS — I25.10 ASHD (ARTERIOSCLEROTIC HEART DISEASE): Primary | ICD-10-CM

## 2020-02-04 NOTE — PROGRESS NOTES
1. Have you been to the ER, urgent care clinic since your last visit? Hospitalized since your last visit? NO    2. Have you seen or consulted any other health care providers outside of the [address] [place] [address] since your last visit? Include any pap smears or colon screening. NO    6 MONTH FOLLOW UP. C/O SLIGHT SWELLING IN BLE.

## 2020-02-04 NOTE — PROGRESS NOTES
[facility], [place], [address] [facility]  [phone]     Subjective: Shell Caldera is a 80 y.o. female is here for routine f/u. She still works out at a local gym---1 mile on treadmill and another mile on stationary bike--denies any exertional cp or loyola. She does report feeling cold, a little tired. TSH was elevated 7.69 in 12/19---will discuss with PCP about possibly starting  On synthroid. She takes lasix for leg swelling     The patient denies chest pain/ shortness of breath, orthopnea, PND,  palpitations, syncope, or presyncope.        Patient Active Problem List    Diagnosis Date Noted    Overweight (BMI 25.0-29.9) 12/02/2019    Tubular adenoma of rectum 05/19/2019    LBBB (left bundle branch block) 07/24/2018    Coronary artery disease involving native coronary artery of native heart with angina pectoris with documented spasm ([facility] [address] 07/05/2018    Hypercholesterolemia 01/19/2017    Osteopenia with high risk of fracture 03/09/2016    Tubular adenoma of colon 08/04/2014    Rotator cuff rupture 03/12/2013    GERD (gastroesophageal reflux disease) 03/14/2011    Benign hypertension with chronic kidney disease, stage II 03/01/2010    Plantar fasciitis 03/01/2010    Spinal stenosis 03/01/2010    Cervical neck pain with evidence of disc disease 03/01/2010      Garrison Truong MD  Past Medical History:   Diagnosis Date    CAD (coronary artery disease)     GERD (gastroesophageal reflux disease)     Hypercholesterolemia     Hypertension     SOB (shortness of breath) 6/14/2018    UTI (urinary tract infection) 7/5/2018      Past Surgical History:   Procedure Laterality Date    ABDOMEN SURGERY PROC UNLISTED  2005    Laproscopic colon polyp excision Dr. Viral Michael  07/05/2018    PCI/JAMISON to LAD and RCA    COLONOSCOPY N/A 5/15/2019    COLONOSCOPY performed by Brodie Thomas MD at [address]  5/15/2019         HX APPENDECTOMY      Age 25    HX HEART CATHETERIZATION  2018    HX HEENT Bilateral     cataracts    HX ORTHOPAEDIC  1996    Lumbar laminectomy    HX PTCA      [place] COLSC FLX W/RMVL OF TUMOR POLYP LESION SNARE TQ  2014          Allergies   Allergen Reactions    Contrast Agent [Iodine] Hives    Penicillins Hives    Bystolic [Nebivolol] Other (comments)     abd pain    Cardizem [Diltiazem Hcl] Rash    Cardura [Doxazosin] Unknown (comments)    Codeine Nausea Only    Cymbalta [Duloxetine] Other (comments)     Abdominal pain, anxiety    Gabapentin Other (comments)     Made her feel crazy    Prinivil [Lisinopril] Unknown (comments)    Tekturna [Aliskiren] Other (comments)     abd cramps    Tenormin [Atenolol] Unknown (comments)      Family History   Problem Relation Age of Onset    Heart Disease Mother     Stroke Mother     Cancer Father     Cancer Brother     Heart Disease Brother     Cancer Brother       Social History     Socioeconomic History    Marital status:      Spouse name: Not on file    Number of children: Not on file    Years of education: Not on file    Highest education level: Not on file   Occupational History    Not on file   Social Needs    Financial resource strain: Not on file    Food insecurity:     Worry: Not on file     Inability: Not on file    Transportation needs:     Medical: Not on file     Non-medical: Not on file   Tobacco Use    Smoking status: Former Smoker     Last attempt to quit: 1982     Years since quittin.8    Smokeless tobacco: Never Used   Substance and Sexual Activity    Alcohol use: No    Drug use: No    Sexual activity: Not on file   Lifestyle    Physical activity:     Days per week: Not on file     Minutes per session: Not on file    Stress: Not on file   Relationships    Social connections:     Talks on phone: Not on file     Gets together: Not on file     Attends Yazidi service: Not on file     Active member of club or organization: Not on file     Attends meetings of clubs or organizations: Not on file     Relationship status: Not on file    Intimate partner violence:     Fear of current or ex partner: Not on file     Emotionally abused: Not on file     Physically abused: Not on file     Forced sexual activity: Not on file   Other Topics Concern    Not on file   Social History Narrative    Not on file      Current Outpatient Medications   Medication Sig    metoprolol succinate (TOPROL-XL) 25 mg XL tablet TAKE 1/2 (ONE-HALF) TABLET BY MOUTH ONCE DAILY    losartan (COZAAR) 100 mg tablet TAKE 1 TABLET BY MOUTH ONCE DAILY    furosemide (LASIX) 20 mg tablet TAKE 1 TABLET BY MOUTH ONCE DAILY    cloNIDine HCl (CATAPRES) 0.1 mg tablet TAKE 1 TABLET BY MOUTH TWICE DAILY    amLODIPine (NORVASC) 5 mg tablet TAKE 1 TABLET BY MOUTH ONCE DAILY    atorvastatin (LIPITOR) 40 mg tablet Take 1 Tab by mouth daily.  raNITIdine (ZANTAC) 150 mg tablet Take 150 mg by mouth as needed.  aspirin 81 mg chewable tablet Take 1 Tab by mouth daily.  acetaminophen (TYLENOL ARTHRITIS PAIN) 650 mg TbER Take 650 mg by mouth as needed.  polyethylene glycol (MIRALAX) 17 gram/dose powder Take 17 g by mouth as needed. No current facility-administered medications for this visit. Review of Symptoms:  11 systems reviewed, negative other than as stated in the HPI    Physical ExamPhysical Exam:    Vitals:    02/04/20 1136 02/04/20 1142   BP: 120/70 122/70   Pulse: 63    Resp: 16    SpO2: 97%    Weight: 161 lb (73 kg)    Height: 5' 4\" (1.626 m)      Body mass index is 27.64 kg/m². General PE  Gen:  NAD  Mental Status - Alert. General Appearance - Not in acute distress. HEENT:  PERRL, no carotid bruits or JVD  Chest and Lung Exam   Inspection: Accessory muscles - No use of accessory muscles in breathing.    Auscultation:   Breath sounds: - Normal.   Cardiovascular   Inspection: Jugular vein - Bilateral - Inspection Normal.   Palpation/Percussion:   Apical Impulse: - Normal.   Auscultation: Rhythm - Regular. Heart Sounds - S1 WNL and S2 WNL. No S3 or S4. Murmurs & Other Heart Sounds: Auscultation of the heart reveals - No Murmurs. Peripheral Vascular   Upper Extremity: Inspection - Bilateral - No Cyanotic nailbeds or Digital clubbing. Lower Extremity:   Palpation: Edema - Bilateral - No edema. Abdomen:   Soft, non-tender, bowel sounds are active.   Neuro: A&O times 3, CN and motor grossly WNL    Labs:   Lab Results   Component Value Date/Time    Cholesterol, total 134 12/19/2019 08:25 AM    Cholesterol, total 128 06/07/2019 07:51 AM    Cholesterol, total 120 04/19/2019 07:49 AM    Cholesterol, total 156 01/04/2019 08:01 AM    Cholesterol, total 148 07/19/2017 12:00 AM    HDL Cholesterol 43 12/19/2019 08:25 AM    HDL Cholesterol 40 06/07/2019 07:51 AM    HDL Cholesterol 36 (L) 04/19/2019 07:49 AM    HDL Cholesterol 42 01/04/2019 08:01 AM    HDL Cholesterol 43 07/19/2017 12:00 AM    LDL, calculated 72 12/19/2019 08:25 AM    LDL, calculated 74 06/07/2019 07:51 AM    LDL, calculated 64 04/19/2019 07:49 AM    LDL, calculated 93 01/04/2019 08:01 AM    LDL, calculated 82 07/19/2017 12:00 AM    Triglyceride 95 12/19/2019 08:25 AM    Triglyceride 72 06/07/2019 07:51 AM    Triglyceride 99 04/19/2019 07:49 AM    Triglyceride 107 01/04/2019 08:01 AM    Triglyceride 115 07/19/2017 12:00 AM     Lab Results   Component Value Date/Time    CK 33 06/12/2018 11:50 AM     Lab Results   Component Value Date/Time    Sodium 142 12/19/2019 08:25 AM    Potassium 4.3 12/19/2019 08:25 AM    Chloride 105 12/19/2019 08:25 AM    CO2 24 12/19/2019 08:25 AM    Anion gap 7 06/14/2018 10:10 AM    Glucose 98 12/19/2019 08:25 AM    BUN 16 12/19/2019 08:25 AM    Creatinine 0.85 12/19/2019 08:25 AM    BUN/Creatinine ratio 19 12/19/2019 08:25 AM    GFR est AA 72 12/19/2019 08:25 AM    GFR est non-AA 63 12/19/2019 08:25 AM    Calcium 9.9 12/19/2019 08:25 AM    Bilirubin, total 0.4 12/19/2019 08:25 AM    AST (SGOT) 22 12/19/2019 08:25 AM    Alk. phosphatase 114 12/19/2019 08:25 AM    Protein, total 6.9 12/19/2019 08:25 AM    Albumin 4.3 12/19/2019 08:25 AM    Globulin 3.7 06/12/2018 11:50 AM    A-G Ratio 1.7 12/19/2019 08:25 AM    ALT (SGPT) 20 12/19/2019 08:25 AM       EKG:  SB LBBB     Assessment:     Assessment:      1. ASHD (arteriosclerotic heart disease)    2. Mixed hyperlipidemia    3. Hypertension, essential    4. LBBB (left bundle branch block)        Orders Placed This Encounter    AMB POC EKG ROUTINE W/ 12 LEADS, INTER & REP     Order Specific Question:   Reason for Exam:     Answer:   ROUTINE        Plan:     Patient presents for f/u, doing well and stable from cardiac standpoint. She still works out at a local gym---1 mile on treadmill and another mile on stationary bike--denies any exertional cp or loyola. She does report feeling cold, a little tired. TSH was elevated 7.69 in 12/19---will discuss with PCP about possibly starting  On synthroid. She takes lasix for leg swelling    Atherosclerotic heart disease/status post PTCA stenting of the RCA and LAD 7/18:   Clinically stable  Continue ASA, BB, statin       Hypertension   Controlled. Continue current medications     Hyperlipidemia:  12/19 LDL at 72 On statin Labs and lipids per PCP        Continue current care and f/u in 6 months. Maureen Bonilla NP       [facility]    2/4/2020         Patient seen, examined by me personally. Plan discussed as detailed. Agree with note as outlined by  NP. I confirm findings in history and physical exam. No additional findings noted. Agree with plan as outlined above. Some of her symptoms appear to be related to her hypothyroidism.  May improve with treatment,     Gabriela Capellan MD

## 2020-02-05 RX ORDER — ATORVASTATIN CALCIUM 40 MG/1
TABLET, FILM COATED ORAL
Qty: 90 TAB | Refills: 0 | Status: SHIPPED | OUTPATIENT
Start: 2020-02-05 | End: 2020-05-05

## 2020-02-10 ENCOUNTER — TELEPHONE (OUTPATIENT)
Dept: INTERNAL MEDICINE CLINIC | Facility: CLINIC | Age: 85
End: 2020-02-10

## 2020-02-10 NOTE — TELEPHONE ENCOUNTER
Pt daughter called and would like the nurse to please call her back at [phone] interference to her mothers last cardiologist appt.

## 2020-02-10 NOTE — TELEPHONE ENCOUNTER
Romero Monserrat pt's daughter called to see if Dr Clau Rudolph had forwarded his note to Dr Farshad Lorenzo? She states pt is depressed (pt said she wasn't when she was here in December but after she got home said she should have said yes). Feels tired and cold all the time, house temperature is set to 78. Pt says her Jj Singh is in a fog, disoriented\", no problem with appetite or hair loss. She would like to start thyroid medicine, [facility]

## 2020-02-10 NOTE — TELEPHONE ENCOUNTER
She needs office visit to evaluate foggy brain. Dr Xavier Castillo note mentions \"possibly starting\" thyroid medication. She does not need of thyroid medication at this time. TSH naturally rises with age and treatment is not recommended unless it is 10 or higher. Her's is just over 7. Thyroxine is a greatly overused medication. Thyroid is not causing her symptoms. After age [de-identified] metabolism slows and almost everyone of this age feels cold and tired. This is a normal physiologic change. Replacing thyroid will not fix this, but will pull calcium from bones.

## 2020-02-11 NOTE — TELEPHONE ENCOUNTER
Sheryl Mayfield pt's daughter given Dr Stella Kingston message, Sheryl Mayfield stated that \"I have been on thyroid replacement for years and the guidelines have been changed, I will take my mom to my doctor\".

## 2020-02-11 NOTE — TELEPHONE ENCOUNTER
Will send Management section of Up To Date article on subclinical hypothyroidism to patient. The situation for those over age [de-identified] is different than for younger patients. Even some physicians do not appreciate this. Do not know upon what basis she says, \"the guidelines have changed. \"

## 2020-03-02 ENCOUNTER — TELEPHONE (OUTPATIENT)
Dept: INTERNAL MEDICINE CLINIC | Facility: CLINIC | Age: 85
End: 2020-03-02

## 2020-03-02 NOTE — TELEPHONE ENCOUNTER
Pt called to check next appt and asked for a call back from the doctor. She is requesting a referral to a psychologist for the elderly. Pt stated she has been having \"some problems\" and \"needs to talk to somebody\". Please return call at [phone].

## 2020-03-02 NOTE — TELEPHONE ENCOUNTER
I do not know anyone who works particularly with seniors. These are probably the best places to start. Jimmy Plummer[address]  [address] [facility], [address]  [phone]    Medical and [address] [facility], [address]  [phone]  [email or web address]      Critical access hospital Counseling  [address] [facility],[address]   [place], [address]   T: [phone]  *doesn't accept medicaid

## 2020-08-03 DIAGNOSIS — I10 HYPERTENSION, ESSENTIAL: ICD-10-CM

## 2020-08-04 RX ORDER — CLONIDINE HYDROCHLORIDE 0.1 MG/1
TABLET ORAL
Qty: 180 TAB | Refills: 0 | Status: SHIPPED | OUTPATIENT
Start: 2020-08-04 | End: 2020-11-02

## 2020-08-04 RX ORDER — AMLODIPINE BESYLATE 5 MG/1
TABLET ORAL
Qty: 90 TAB | Refills: 0 | Status: SHIPPED | OUTPATIENT
Start: 2020-08-04 | End: 2020-11-02

## 2020-08-04 RX ORDER — FUROSEMIDE 20 MG/1
TABLET ORAL
Qty: 90 TAB | Refills: 0 | Status: SHIPPED | OUTPATIENT
Start: 2020-08-04 | End: 2020-11-02

## 2020-08-12 ENCOUNTER — OFFICE VISIT (OUTPATIENT)
Dept: INTERNAL MEDICINE CLINIC | Age: 85
End: 2020-08-12
Payer: MEDICARE

## 2020-08-12 VITALS
RESPIRATION RATE: 18 BRPM | WEIGHT: 161 LBS | DIASTOLIC BLOOD PRESSURE: 83 MMHG | BODY MASS INDEX: 27.49 KG/M2 | HEART RATE: 76 BPM | SYSTOLIC BLOOD PRESSURE: 136 MMHG | TEMPERATURE: 98.2 F | OXYGEN SATURATION: 95 % | HEIGHT: 64 IN

## 2020-08-12 DIAGNOSIS — N18.2 BENIGN HYPERTENSION WITH CHRONIC KIDNEY DISEASE, STAGE II: ICD-10-CM

## 2020-08-12 DIAGNOSIS — I12.9 BENIGN HYPERTENSION WITH CHRONIC KIDNEY DISEASE, STAGE II: ICD-10-CM

## 2020-08-12 DIAGNOSIS — E78.00 HYPERCHOLESTEROLEMIA: ICD-10-CM

## 2020-08-12 DIAGNOSIS — Z00.00 MEDICARE ANNUAL WELLNESS VISIT, SUBSEQUENT: Primary | ICD-10-CM

## 2020-08-12 DIAGNOSIS — I25.10 CORONARY ARTERY DISEASE INVOLVING NATIVE CORONARY ARTERY OF NATIVE HEART WITHOUT ANGINA PECTORIS: ICD-10-CM

## 2020-08-12 DIAGNOSIS — R73.02 IGT (IMPAIRED GLUCOSE TOLERANCE): ICD-10-CM

## 2020-08-12 DIAGNOSIS — R35.0 URINARY FREQUENCY: ICD-10-CM

## 2020-08-12 DIAGNOSIS — E55.9 VITAMIN D DEFICIENCY: ICD-10-CM

## 2020-08-12 DIAGNOSIS — Z23 NEED FOR SHINGLES VACCINE: ICD-10-CM

## 2020-08-12 DIAGNOSIS — M48.07 SPINAL STENOSIS OF LUMBOSACRAL REGION: ICD-10-CM

## 2020-08-12 DIAGNOSIS — M85.80 OSTEOPENIA, UNSPECIFIED LOCATION: ICD-10-CM

## 2020-08-12 DIAGNOSIS — Z13.31 SCREENING FOR DEPRESSION: ICD-10-CM

## 2020-08-12 DIAGNOSIS — Z78.9 ADVANCE DIRECTIVE ON FILE: ICD-10-CM

## 2020-08-12 PROBLEM — D12.8 TUBULAR ADENOMA OF RECTUM: Status: RESOLVED | Noted: 2019-05-19 | Resolved: 2020-08-12

## 2020-08-12 LAB
BILIRUB UR QL STRIP: NEGATIVE
GLUCOSE UR-MCNC: NEGATIVE MG/DL
KETONES P FAST UR STRIP-MCNC: NEGATIVE MG/DL
PH UR STRIP: 5.5 [PH] (ref 4.6–8)
PROT UR QL STRIP: NEGATIVE
SP GR UR STRIP: 1.01 (ref 1–1.03)
UA UROBILINOGEN AMB POC: NORMAL (ref 0.2–1)
URINALYSIS CLARITY POC: CLEAR
URINALYSIS COLOR POC: YELLOW
URINE BLOOD POC: NEGATIVE
URINE LEUKOCYTES POC: NORMAL
URINE NITRITES POC: NEGATIVE

## 2020-08-12 PROCEDURE — 99215 OFFICE O/P EST HI 40 MIN: CPT | Performed by: INTERNAL MEDICINE

## 2020-08-12 PROCEDURE — G0444 DEPRESSION SCREEN ANNUAL: HCPCS | Performed by: INTERNAL MEDICINE

## 2020-08-12 PROCEDURE — 81003 URINALYSIS AUTO W/O SCOPE: CPT | Performed by: INTERNAL MEDICINE

## 2020-08-12 PROCEDURE — G0439 PPPS, SUBSEQ VISIT: HCPCS | Performed by: INTERNAL MEDICINE

## 2020-08-12 NOTE — PROGRESS NOTES
This is the Subsequent Medicare Annual Wellness Exam, performed 12 months or more after the Initial AWV or the last Subsequent AWV I have reviewed the patient's medical history in detail and updated the computerized patient record. History Patient Active Problem List  
Diagnosis Code  Benign hypertension with chronic kidney disease, 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, N18.2  Plantar fasciitis M72.2  Spinal stenosis M48.00  Cervical neck pain with evidence of disc disease M50.90  GERD (gastroesophageal reflux disease) K21.9  Rotator cuff rupture M75.100  Tubular adenoma of colon D12.6  Osteopenia with high risk of fracture M85.80  Hypercholesterolemia E78.00  Coronary artery disease involving native coronary artery of native heart with angina pectoris with documented spasm ([facility] [place] [address] I25.111  LBBB (left bundle branch block) I44.7  Tubular adenoma of rectum D12.8  Overweight (BMI 25.0-29. 9) Taz Oliva Past Medical History:  
Diagnosis Date  CAD (coronary artery disease)  GERD (gastroesophageal reflux disease)  Hypercholesterolemia  Hypertension  SOB (shortness of breath) 6/14/2018  UTI (urinary tract infection) 7/5/2018 Past Surgical History:  
Procedure Laterality Date [address] [place] [address] UNLISTED  2005 Laproscopic colon polyp excision Dr. Joyce Recinos  CARDIAC SURG PROCEDURE UNLIST  07/05/2018 PCI/JAMISON to LAD and RCA  COLONOSCOPY N/A 5/15/2019 COLONOSCOPY performed by Bia Alonzo MD at [facility] ENDOSCOPY  COLONOSCOPY,[facility]  5/15/2019  HX APPENDECTOMY Age 25  
[address]  07/05/2018  HX HEENT Bilateral   
 cataracts [address] [facility] Lumbar laminectomy  HX PTCA  [place] COLSC FLX W/RMVL OF TUMOR POLYP LESION SNARE TQ  7/18/2014 Current Outpatient Medications Medication Sig Dispense Refill  amLODIPine (NORVASC) 5 mg tablet Take 1 tablet by mouth once daily 90 Tab 0  cloNIDine HCL (CATAPRES) 0.1 mg tablet Take 1 tablet by mouth twice daily 180 Tab 0  
 furosemide (LASIX) 20 mg tablet Take 1 tablet by mouth once daily 90 Tab 0  
 atorvastatin (LIPITOR) 40 mg tablet Take 1 tablet by mouth once daily 90 Tab 3  
 metoprolol succinate (TOPROL-XL) 25 mg XL tablet TAKE 1/2 (ONE-HALF) TABLET BY MOUTH ONCE DAILY 45 Tab 3  
 losartan (COZAAR) 100 mg tablet TAKE 1 TABLET BY MOUTH ONCE DAILY 90 Tab 3  
 aspirin 81 mg chewable tablet Take 1 Tab by mouth daily. 30 Tab 0  
 acetaminophen (TYLENOL ARTHRITIS PAIN) 650 mg TbER Take 650 mg by mouth as needed.  polyethylene glycol (MIRALAX) 17 gram/dose powder Take 17 g by mouth as needed. Allergies Allergen Reactions  Contrast Agent [Iodine] Hives  Penicillins Hives  Bystolic [Nebivolol] Other (comments)  
  abd pain  Cardizem [Diltiazem Hcl] Rash  Cardura [Doxazosin] Unknown (comments)  Codeine Nausea Only  Cymbalta [Duloxetine] Other (comments) Abdominal pain, anxiety  Gabapentin Other (comments) Made her feel crazy  Prinivil [Lisinopril] Unknown (comments)  Tekturna [Aliskiren] Other (comments)  
  abd cramps  Tenormin [Atenolol] Unknown (comments) Family History Problem Relation Age of Onset  Heart Disease Mother  Stroke Mother  Cancer Father  Cancer Brother  Heart Disease Brother  Cancer Brother Social History Tobacco Use  Smoking status: Former Smoker Last attempt to quit: 1982 Years since quittin.3  Smokeless tobacco: Never Used Substance Use Topics  Alcohol use: No  
 
 
Depression Risk Factor Screening:  
 
3 most recent PHQ Screens 2020 Little interest or pleasure in doing things Not at all Feeling down, depressed, irritable, or hopeless Not at all Total Score PHQ 2 0 Trouble falling or staying asleep, or sleeping too much - Feeling tired or having little energy -  
 Poor appetite, weight loss, or overeating - Feeling bad about yourself - or that you are a failure or have let yourself or your family down - Trouble concentrating on things such as school, work, reading, or watching TV - Moving or speaking so slowly that other people could have noticed; or the opposite being so fidgety that others notice - Thoughts of being better off dead, or hurting yourself in some way -  
PHQ 9 Score - Alcohol Risk Factor Screening: Do you average 1 drink per night or more than 7 drinks a week:  {Yes/No:601392::\"No\"} On any one occasion in the past three months have you have had more than 3 drinks containing alcohol:  {Yes/No:357885::\"No\"} Functional Ability and Level of Safety:  
Hearing: {Desc; hearing loss:42751::\"Hearing is good. \"} Activities of Daily Living: The home contains: {AWV Home IWOVBA:85497::\"TE safety equipment. \"} 
{Functional ADL's:78778::\"Patient does total self care\"} Ambulation: {Patient ambulates:23383::\"with no difficulty\"} Fall Risk: 
Fall Risk Assessment, last 12 mths 2020 Able to walk? Yes Fall in past 12 months? No  
Fall with injury? -  
Number of falls in past 12 months - Fall Risk Score -  
 
Abuse Screen: 
{Abuse Screen:::\"Patient is not abused\"} Cognitive Screening Has your family/caregiver stated any concerns about your memory: {AWV no/yes:32172::\"no\"} {Cognitive Screenin} Patient Care Team  
Patient Care Team: 
Roldan Harrington MD as PCP - General (Internal Medicine) Desmond Bryant MD (Pain Management) Shruthi Jeffries MD (Ophthalmology) Serge Cross MD as Physician (Cardiology) Assessment/Plan Education and counseling provided: 
{Education List, choose as appropriate:::\"Are appropriate based on today's review and evaluation\"} Diagnoses and all orders for this visit: 
 
1. Medicare annual wellness visit, subsequent 2. Screening for depression -     Arsen Macedo Other orders 
-     REFERRAL TO OPHTHALMOLOGY Health Maintenance Due Topic Date Due  Shingrix Vaccine Age 50> (1 of 2) 03/15/1984  GLAUCOMA SCREENING Q2Y  03/13/2020  Medicare Yearly Exam  05/20/2020  Influenza Age 5 to Adult  08/01/2020

## 2020-08-12 NOTE — PROGRESS NOTES
Axel Monae is a 80 y.o. female  Chief Complaint   Patient presents with    Hypertension    Coronary Artery Disease    GERD     Health Maintenance Due   Topic Date Due    Shingrix Vaccine Age 49> (1 of 2) 03/15/1984    GLAUCOMA SCREENING Q2Y  03/13/2020    Medicare Yearly Exam  05/20/2020    Influenza Age 5 to Adult  08/01/2020     Visit Vitals  /83 (BP 1 Location: Left arm, BP Patient Position: Sitting)   Pulse 76   Temp 98.2 °F (36.8 °C) (Oral)   Resp 18   Ht 5' 4\" (1.626 m)   Wt 161 lb (73 kg)   SpO2 95%   BMI 27.64 kg/m²     1. Have you been to the ER, urgent care clinic since your last visit? Hospitalized since your last visit? No     2. Have you seen or consulted any other health care providers outside of the [address] [place] [address] since your last visit? Include any pap smears or colon screening.   No     Room #:  C

## 2020-08-12 NOTE — PROGRESS NOTES
This is the Subsequent Medicare Annual Wellness Exam, performed 12 months or more after the Initial AWV or the last Subsequent AWV    I have reviewed the patient's medical history in detail and updated the computerized patient record. History   Lauretta Skiff is a 80 y.o. female    Presents for Medicare AWV and to establish care. Former PCP was Dr. Marylee Stair, now retired. She has HTN with CKD stage 2, hypercholesterolemia, CAD s/p 2 stents in 7/18, GERD, chronic low back pain due to spinal stenosis s/p lumbar laminectomy in 1996, and osteopenia. Reports chronic low back pain relieved with acetaminophen. Increased urinary frequency; denies fevers, chills, dysuria, urgency, or hematuria. Cardiovascular Review  The patient has hypertension and hyperlipidemia. Denies HA's, CP, or SOB. She reports taking medications as instructed, no medication side effects noted. Diet and Lifestyle: generally follows a low fat low cholesterol diet, generally follows a low sodium diet, exercises regularly. Has home BP monitor. Soc Hx  . Has 2 children and 5 grandchildren. Retired  (worked at [employer], then [employer], then  [employer]). Former smoker; quit in 1982. Denies alcohol. Exercises regularly by going to the gym. Health Maintenance  Flu vaccine: 12/2/19  Pneumonia vaccine: PCV-13 4/3/15, PPSV-23 2002  Tetanus vaccine:  Tdap 7/26/13  Zoster vaccine: 6/30/09, due for Shingrix  Mammogram: 6/10/19, negative  Colonoscopy: 5/15/19, Dr. Jose Juan, 1 rectal tubular adenoma, int hemorrhoids, mild diverticulosis  Bone density: 3/9/16, osteopenia  Eye exam: 2019, Dr. Evelina Kan  Lipids: 12/19/19 (tot chol 134, LDL 72)  A1c: never checked  Advanced Directives: on file  End of Life: on file       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, N18.2    Plantar fasciitis M72.2    Spinal stenosis M48.00    Cervical neck pain with evidence of disc disease M50.90    GERD (gastroesophageal reflux disease) K21.9    Rotator cuff rupture M75.100    Tubular adenoma of colon D12.6    Osteopenia with high risk of fracture M85.80    Hypercholesterolemia E78.00    Coronary artery disease involving native coronary artery of native heart with angina pectoris with documented spasm (HCC) I25.111    LBBB (left bundle branch block) I44.7    Tubular adenoma of rectum D12.8    Overweight (BMI 25.0-29. 9) ESP1947     Past Medical History:   Diagnosis Date    CAD (coronary artery disease)     GERD (gastroesophageal reflux disease)     Hypercholesterolemia     Hypertension     SOB (shortness of breath) 6/14/2018    UTI (urinary tract infection) 7/5/2018      Past Surgical History:   Procedure Laterality Date    ABDOMEN SURGERY PROC UNLISTED  2005    Laproscopic colon polyp excision Dr. Weber Race  07/05/2018    PCI/JAMISON to LAD and RCA    COLONOSCOPY N/A 5/15/2019    COLONOSCOPY performed by Liyah Bhandari MD at [facility] ENDOSCOPY    COLONOSCOPY,FLORA NICHOLAS,SNARE  5/15/2019         HX APPENDECTOMY      Age 25    HX HEART CATHETERIZATION  07/05/2018    HX HEENT Bilateral     cataracts    HX ORTHOPAEDIC  1996    Lumbar laminectomy    HX PTCA      MO COLSC FLX W/RMVL OF TUMOR POLYP LESION SNARE TQ  7/18/2014          Current Outpatient Medications   Medication Sig Dispense Refill    amLODIPine (NORVASC) 5 mg tablet Take 1 tablet by mouth once daily 90 Tab 0    cloNIDine HCL (CATAPRES) 0.1 mg tablet Take 1 tablet by mouth twice daily 180 Tab 0    furosemide (LASIX) 20 mg tablet Take 1 tablet by mouth once daily 90 Tab 0    atorvastatin (LIPITOR) 40 mg tablet Take 1 tablet by mouth once daily 90 Tab 3    metoprolol succinate (TOPROL-XL) 25 mg XL tablet TAKE 1/2 (ONE-HALF) TABLET BY MOUTH ONCE DAILY 45 Tab 3    losartan (COZAAR) 100 mg tablet TAKE 1 TABLET BY MOUTH ONCE DAILY 90 Tab 3    aspirin 81 mg chewable tablet Take 1 Tab by mouth daily. 30 Tab 0    acetaminophen (TYLENOL ARTHRITIS PAIN) 650 mg TbER Take 650 mg by mouth as needed.  polyethylene glycol (MIRALAX) 17 gram/dose powder Take 17 g by mouth as needed.        Allergies   Allergen Reactions    Contrast Agent [Iodine] Hives    Penicillins Hives    Bystolic [Nebivolol] Other (comments)     abd pain    Cardizem [Diltiazem Hcl] Rash    Cardura [Doxazosin] Unknown (comments)    Codeine Nausea Only    Cymbalta [Duloxetine] Other (comments)     Abdominal pain, anxiety    Gabapentin Other (comments)     Made her feel crazy    Prinivil [Lisinopril] Unknown (comments)    Tekturna [Aliskiren] Other (comments)     abd cramps    Tenormin [Atenolol] Unknown (comments)       Family History   Problem Relation Age of Onset    Heart Disease Mother     Stroke Mother     Cancer Father     Cancer Brother     Heart Disease Brother     Cancer Brother      Social History     Tobacco Use    Smoking status: Former Smoker     Last attempt to quit: 1982     Years since quittin.3    Smokeless tobacco: Never Used   Substance Use Topics    Alcohol use: No       Depression Risk Factor Screening:     3 most recent PHQ Screens 2020   Little interest or pleasure in doing things Not at all   Feeling down, depressed, irritable, or hopeless Not at all   Total Score PHQ 2 0   Trouble falling or staying asleep, or sleeping too much -   Feeling tired or having little energy -   Poor appetite, weight loss, or overeating -   Feeling bad about yourself - or that you are a failure or have let yourself or your family down -   Trouble concentrating on things such as school, work, reading, or watching TV -   Moving or speaking so slowly that other people could have noticed; or the opposite being so fidgety that others notice -   Thoughts of being better off dead, or hurting yourself in some way -   PHQ 9 Score -       Alcohol Risk Factor Screening:   Do you average 1 drink per night or more than 7 drinks a week:  No    On any one occasion in the past three months have you have had more than 3 drinks containing alcohol:  No      Functional Ability and Level of Safety:   Hearing: Hearing is good. Activities of Daily Living: The home contains: no safety equipment. Patient does total self care     Ambulation: with no difficulty     Fall Risk:  Fall Risk Assessment, last 12 mths 8/12/2020   Able to walk? Yes   Fall in past 12 months? No   Fall with injury? -   Number of falls in past 12 months -   Fall Risk Score -     Abuse Screen:  Patient is not abused       Cognitive Screening   Has your family/caregiver stated any concerns about your memory: no     Cognitive Screening: normal by exam    Visit Vitals  /83 (BP 1 Location: Left arm, BP Patient Position: Sitting)   Pulse 76   Temp 98.2 °F (36.8 °C) (Oral)   Resp 18   Ht 5' 4\" (1.626 m)   Wt 161 lb (73 kg)   SpO2 95%   BMI 27.64 kg/m²       General: Well-developed and well-nourished, no distress. HEENT:  Head normocephalic/atraumatic, no scleral icterus  Neck: Supple. No carotid bruits, JVD, lymphadenopathy, or thyromegaly. Lungs:  Clear to auscultation bilaterally. Good air movement. Heart:  Regular rate and rhythm, normal S1 and S2, no murmur, gallop, or rub  Abdomen: Soft, non-distended, normal bowel sounds, no tenderness, no guarding, masses, rebound tenderness, or HSM. Extremities: No clubbing, cyanosis, or edema. Neurological: Alert and oriented. Psychiatric: Normal mood and affect.  Behavior is normal.      Results for orders placed or performed in visit on 08/12/20   AMB POC URINALYSIS DIP STICK AUTO W/O MICRO   Result Value Ref Range    Color (UA POC) Yellow     Clarity (UA POC) Clear     Glucose (UA POC) Negative Negative    Bilirubin (UA POC) Negative Negative    Ketones (UA POC) Negative Negative    Specific gravity (UA POC) 1.010 1.001 - 1.035    Blood (UA POC) Negative Negative    pH (UA POC) 5.5 4.6 - 8.0    Protein (UA POC) Negative Negative    Urobilinogen (UA POC) 0.2 mg/dL 0.2 - 1    Nitrites (UA POC) Negative Negative    Leukocyte esterase (UA POC) Trace Negative       Patient Care Team   Patient Care Team:  Moises Gates MD as PCP - General (Internal Medicine)  Pura Aguilar MD (Pain Management)  Queen Marisela MD (Ophthalmology)  Alvina Lopez MD as Physician (Cardiology)    Assessment/Plan   Education and counseling provided:  Are appropriate based on today's review and evaluation  End-of-Life planning (with patient's consent)    Diagnoses and all orders for this visit:    1. Medicare annual wellness visit, subsequent    2. Benign hypertension with chronic kidney disease, stage II  Controlled. Continue losartan, amlodipine, metoprolol, and clonidine. On Lasix 20 mg for leg swelling. 3. Hypercholesterolemia  Controlled on atorvastatin. 4. Coronary artery disease involving native coronary artery of native heart without angina pectoris  Stable on ASA 81 mg daily, carvedilol, and statin. 5. Spinal stenosis of lumbosacral region  Controlled on acetaminophen. 6. Urinary frequency  No UTI.  -     AMB POC URINALYSIS DIP STICK AUTO W/O MICRO    7. Osteopenia, unspecified location  Continue regular exercise. 8. Need for shingles vaccine  Instructed to get from her pharmacy. 9. Screening for depression  -     [place] [id number]    10. Advance directive on file     Greater than 40 mins direct face-to-face time spent with patient. Greater than 50% of time spent on counseling and coordination of care. Follow-up and Dispositions    · Return in about 6 months (around 2/12/2021), or if symptoms worsen or fail to improve, for HTN, hyperlipidemia.            Health Maintenance Due   Topic Date Due    Shingrix Vaccine Age 49> (1 of 2) 03/15/1984    GLAUCOMA SCREENING Q2Y  03/13/2020    Influenza Age 9 to Adult  08/01/2020

## 2020-08-12 NOTE — PATIENT INSTRUCTIONS
Medicare Wellness Visit, Female The best way to live healthy is to have a lifestyle where you eat a well-balanced diet, exercise regularly, limit alcohol use, and quit all forms of tobacco/nicotine, if applicable. Regular preventive services are another way to keep healthy. Preventive services (vaccines, screening tests, monitoring & exams) can help personalize your care plan, which helps you manage your own care. Screening tests can find health problems at the earliest stages, when they are easiest to treat. [facility] follows the current, evidence-based guidelines published by the [facility] Guard[facility] ([facility]) when recommending preventive services for our patients. Because we follow these guidelines, sometimes recommendations change over time as research supports it. (For example, mammograms used to be recommended annually. Even though Medicare will still pay for an annual mammogram, the newer guidelines recommend a mammogram every two years for women of average risk). Of course, you and your doctor may decide to screen more often for some diseases, based on your risk and your co-morbidities (chronic disease you are already diagnosed with). Preventive services for you include: - Medicare offers their members a free annual wellness visit, which is time for you and your primary care provider to discuss and plan for your preventive service needs. Take advantage of this benefit every year! 
-All adults over the age of 72 should receive the recommended pneumonia vaccines. Current USPSTF guidelines recommend a series of two vaccines for the best pneumonia protection.  
-All adults should have a flu vaccine yearly and a tetanus vaccine every 10 years.  
-All adults age 48 and older should receive the shingles vaccines (series of two vaccines). -All adults age 38-68 who are overweight should have a diabetes screening test once every three years. -All adults born between [birth year] and 1965 should be screened once for Hepatitis C. 
-Other screening tests and preventive services for persons with diabetes include: an eye exam to screen for diabetic retinopathy, a kidney function test, a foot exam, and stricter control over your cholesterol.  
-Cardiovascular screening for adults with routine risk involves an electrocardiogram (ECG) at intervals determined by your doctor.  
-Colorectal cancer screenings should be done for adults age 54-65 with no increased risk factors for colorectal cancer. There are a number of acceptable methods of screening for this type of cancer. Each test has its own benefits and drawbacks. Discuss with your doctor what is most appropriate for you during your annual wellness visit. The different tests include: colonoscopy (considered the best screening method), a fecal occult blood test, a fecal DNA test, and sigmoidoscopy. 
 
-A bone mass density test is recommended when a woman turns 65 to screen for osteoporosis. This test is only recommended one time, as a screening. Some providers will use this same test as a disease monitoring tool if you already have osteoporosis. -Breast cancer screenings are recommended every other year for women of normal risk, age 54-69. 
-Cervical cancer screenings for women over age 72 are only recommended with certain risk factors. Here is a list of your current Health Maintenance items (your personalized list of preventive services) with a due date: 
Health Maintenance Due Topic Date Due  Shingles Vaccine (1 of 2) 03/15/1984  Glaucoma Screening   03/13/2020  Flu Vaccine  08/01/2020

## 2020-08-13 NOTE — ACP (ADVANCE CARE PLANNING)
Advance Care Planning       Advance Care Planning (ACP) Physician/NP/PA (Provider) Conversation        Date of ACP Conversation: 8/12/2020    Conversation Conducted with:   Patient with Decision Making Maury Greer Maker:    Current Designated Health Care Decision Maker:   (If there is a valid [address] named in the [address]" box in the ACP activity, but it is not visible above, be sure to open that field and then select the health care decision maker relationship (ie \"primary\") in the blank space to the right of the name.)    Note: Assess and validate information in current ACP documents, as indicated. If no Authorized Decision Maker has previously been identified, then patient chooses [address]:  \"Who would you like to name as your primary health care decision-maker? \"    Name: Shira Almodovar   Relationship: Daughter Phone number: [phone] (cell), [phone] (home)  Vinod Garcia this person be reached easily? \" YES  \"Who would you like to name as your back-up decision maker? \"   Name: Threasa Goltz  Relationship: Son Phone number: [phone]  Vinod Garcia this person be reached easily? \" YES    Note: If the relationship of these Decision-Makers to the patient does NOT follow your state's Next of Kin hierarchy, recommend that patient complete ACP document that meets state-specific requirements to allow them to act on the patient's behalf when appropriate. Care Preferences:    Hospitalization: \"If your health worsens and it becomes clear that your chance of recovery is unlikely, what would your preference be regarding hospitalization? \"  If the patient would want hospitalization, answer \"yes\". If the patient would prefer comfort-focused treatment without hospitalization, answer \"no\". yes      Ventilation:   \"If you were in your present state of health and suddenly became very ill and were unable to breathe on your own, what would your preference be about the use of a ventilator (breathing machine) if it was available to you? \"    If patient would desire the use of a ventilator (breathing machine), answer \"yes\", if not answer \"no\":yes    \"If your health worsens and it becomes clear that your chance of recovery is unlikely, what would your preference be about the use of a ventilator (breathing machine) if it was available to you? \"   no      Resuscitation:  \"CPR works best to restart the heart when there is a sudden event, like a heart attack, in someone who is otherwise healthy. Unfortunately, CPR does not typically restart the heart for people who have serious health conditions or who are very sick. \"    \"In the event your heart stopped as a result of an underlying serious health condition, would you want attempts to be made to restart your heart (answer \"yes\" for attempt to resuscitate) or would you prefer a natural death (answer \"no\" for do not attempt to resuscitate)? \"   yes    NOTE: If the patient has a valid advance directive AND provides care preference(s) that are inconsistent with that prior directive, advise the patient to consider either: creating a new advance directive that complies with state-specific requirements; or, if that is not possible, orally revoking that prior directive in accordance with state-specific requirements, which must be documented in the EHR.     Conversation Outcomes / Follow-Up Plan:   Reviewed Advance Care on file      Length of Voluntary ACP Conversation in minutes:  <16 minutes (Non-Billable)      Adina Tello MD

## 2020-08-28 NOTE — PROGRESS NOTES
Erich Renee, IKNJALP-BC    Subjective/HPI:     Michael Angelo is a 80 y.o. female is here for routine f/u. She has a PMHx of CAD, HTN, HLD and GERD. She reports to be doing well. She switched her BP regimen to night time last week because she was having some dizziness mid-day. She says dizziness is better. She says she is going through some personal problems that are making her very anxious and increasing stress levels. She doesn't want to talk about it today as her daughter is not aware of what is happening and she is accompanying her to this visit today. She is going to [facility] next week and wants to know if it is safe for her to do so. She denies complaints of chest pains, dizziness, orthopnea, PND or edema. She denies palpitation symptoms or shortness of breath with exertion.          PCP Provider  Moises Gates MD    Past Medical History:   Diagnosis Date    CAD (coronary artery disease)     GERD (gastroesophageal reflux disease)     Hypercholesterolemia     Hypertension     Plantar fasciitis 3/1/2010    Rotator cuff rupture 3/12/2013    1/13  MRI shows complete tear of supraspinatus with retraction, other are OK     SOB (shortness of breath) 6/14/2018    Tubular adenoma of colon 8/4/2014 July 2014     Tubular adenoma of rectum 5/19/2019 5/19/19    UTI (urinary tract infection) 7/5/2018        Allergies   Allergen Reactions    Contrast Agent [Iodine] Hives    Penicillins Hives    Bystolic [Nebivolol] Other (comments)     abd pain    Cardizem [Diltiazem Hcl] Rash    Cardura [Doxazosin] Unknown (comments)    Codeine Nausea Only    Cymbalta [Duloxetine] Other (comments)     Abdominal pain, anxiety    Gabapentin Other (comments)     Made her feel crazy    Prinivil [Lisinopril] Unknown (comments)    Tekturna [Aliskiren] Other (comments)     abd cramps    Tenormin [Atenolol] Unknown (comments)        Outpatient Encounter Medications as of 8/31/2020 Medication Sig Dispense Refill    amLODIPine (NORVASC) 5 mg tablet Take 1 tablet by mouth once daily 90 Tab 0    cloNIDine HCL (CATAPRES) 0.1 mg tablet Take 1 tablet by mouth twice daily 180 Tab 0    furosemide (LASIX) 20 mg tablet Take 1 tablet by mouth once daily 90 Tab 0    atorvastatin (LIPITOR) 40 mg tablet Take 1 tablet by mouth once daily 90 Tab 3    metoprolol succinate (TOPROL-XL) 25 mg XL tablet TAKE 1/2 (ONE-HALF) TABLET BY MOUTH ONCE DAILY 45 Tab 3    losartan (COZAAR) 100 mg tablet TAKE 1 TABLET BY MOUTH ONCE DAILY 90 Tab 3    aspirin 81 mg chewable tablet Take 1 Tab by mouth daily. 30 Tab 0    acetaminophen (TYLENOL ARTHRITIS PAIN) 650 mg TbER Take 650 mg by mouth as needed.  polyethylene glycol (MIRALAX) 17 gram/dose powder Take 17 g by mouth as needed. No facility-administered encounter medications on file as of 8/31/2020. Review of Symptoms:    Review of Systems   Constitutional: Negative for chills, fever and weight loss. HENT: Negative for nosebleeds. Eyes: Negative for blurred vision and double vision. Respiratory: Negative for cough, shortness of breath and wheezing. Cardiovascular: Negative for chest pain, palpitations, orthopnea, leg swelling and PND. Gastrointestinal: Negative for abdominal pain, blood in stool, diarrhea, nausea and vomiting. Musculoskeletal: Negative for joint pain. Skin: Negative for rash. Neurological: Negative for dizziness, tingling and loss of consciousness. Endo/Heme/Allergies: Does not bruise/bleed easily. Physical Exam:      General: Well developed, in no acute distress, cooperative and alert  HEENT: No carotid bruits, no JVD, trach is midline. Neck Supple, PEERL, EOM intact. Heart:  reg rate and rhythm; normal S1/S2; no murmurs, no gallops or rubs. Respiratory: Clear bilaterally x 4, no wheezing or rales  Abdomen:   Soft, non-tender, no distention, no masses. + BS.    Extremities:  Normal cap refill, no cyanosis, atraumatic. No edema. Neuro: A&Ox3, speech clear, gait stable. Skin: Skin color is normal. No rashes or lesions. Non diaphoretic  Vascular: 2+ pulses symmetric in all extremities    Vitals:    08/31/20 1021 08/31/20 1034 08/31/20 1042   BP: (!) 170/102 (!) 190/100 (!) 184/108   Pulse: 87     Resp: 16     SpO2: 98%     Weight: 160 lb 3.2 oz (72.7 kg)     Height: 5' 4\" (1.626 m)         ECG: sinus rhythm; LBBB    Cardiology Labs:    Lab Results   Component Value Date/Time    Cholesterol, total 134 12/19/2019 08:25 AM    HDL Cholesterol 43 12/19/2019 08:25 AM    LDL, calculated 72 12/19/2019 08:25 AM    LDL, calculated 74 06/07/2019 07:51 AM    LDL, calculated 64 04/19/2019 07:49 AM    VLDL, calculated 19 12/19/2019 08:25 AM       No results found for: HBA1C, UIS2DRWC, STG2HZPW, JFL5DQSQ    Lab Results   Component Value Date/Time    Sodium 142 12/19/2019 08:25 AM    Potassium 4.3 12/19/2019 08:25 AM    Chloride 105 12/19/2019 08:25 AM    CO2 24 12/19/2019 08:25 AM    Glucose 98 12/19/2019 08:25 AM    BUN 16 12/19/2019 08:25 AM    Creatinine 0.85 12/19/2019 08:25 AM    BUN/Creatinine ratio 19 12/19/2019 08:25 AM    GFR est AA 72 12/19/2019 08:25 AM    GFR est non-AA 63 12/19/2019 08:25 AM    Calcium 9.9 12/19/2019 08:25 AM    Anion gap 7 06/14/2018 10:10 AM    Bilirubin, total 0.4 12/19/2019 08:25 AM    ALT (SGPT) 20 12/19/2019 08:25 AM    Alk. phosphatase 114 12/19/2019 08:25 AM    Protein, total 6.9 12/19/2019 08:25 AM    Albumin 4.3 12/19/2019 08:25 AM    Globulin 3.7 06/12/2018 11:50 AM    A-G Ratio 1.7 12/19/2019 08:25 AM          Assessment:       ICD-10-CM ICD-9-CM    1. Coronary artery disease involving native coronary artery of native heart with angina pectoris with documented spasm (HCC)  I25.111 414.01      413.9    2. Benign hypertension with chronic kidney disease, stage II  I12.9 403.10     N18.2 585.2    3.  Hypercholesterolemia  E78.00 272.0 AMB POC EKG ROUTINE W/ 12 LEADS, INTER & REP Plan:     1. Coronary artery disease involving native coronary artery of native heart with angina pectoris with documented spasm (HCC)  Hx of JAMISON to RCA and LAD in 7/2018  Without anginal or anginal equivalent symptoms  Echo done 6/2018 with reduced LVEF 45-50%, mild MR  Repeat echo  Continue Toprol, amlodipine, ASA and statin    2. Benign hypertension with chronic kidney disease, stage II  Advised to monitor BP at home; call for BP > 150/90  Discussed role of anxiety    3. Hypercholesterolemia  LDL 72 in 12/2019  Continue statin therapy and low fat, low cholesterol diet  Lipids managed by PCP    F/u with Dr. Parra Deajono in 1 year    Pollo Hernandez NP       [facility]    8/31/2020         Patient seen, examined by me personally. Plan discussed as detailed. Agree with note as outlined by  NP. I confirm findings in history and physical exam. No additional findings noted. Agree with plan as outlined above.      Matthieu Claros MD

## 2020-08-31 ENCOUNTER — OFFICE VISIT (OUTPATIENT)
Dept: CARDIOLOGY CLINIC | Age: 85
End: 2020-08-31
Payer: MEDICARE

## 2020-08-31 VITALS
SYSTOLIC BLOOD PRESSURE: 184 MMHG | RESPIRATION RATE: 16 BRPM | DIASTOLIC BLOOD PRESSURE: 108 MMHG | WEIGHT: 160.2 LBS | BODY MASS INDEX: 27.35 KG/M2 | OXYGEN SATURATION: 98 % | HEART RATE: 87 BPM | HEIGHT: 64 IN

## 2020-08-31 DIAGNOSIS — N18.2 BENIGN HYPERTENSION WITH CHRONIC KIDNEY DISEASE, STAGE II: ICD-10-CM

## 2020-08-31 DIAGNOSIS — E78.00 HYPERCHOLESTEROLEMIA: ICD-10-CM

## 2020-08-31 DIAGNOSIS — I25.111 CORONARY ARTERY DISEASE INVOLVING NATIVE CORONARY ARTERY OF NATIVE HEART WITH ANGINA PECTORIS WITH DOCUMENTED SPASM (HCC): ICD-10-CM

## 2020-08-31 DIAGNOSIS — I12.9 BENIGN HYPERTENSION WITH CHRONIC KIDNEY DISEASE, STAGE II: ICD-10-CM

## 2020-08-31 DIAGNOSIS — I25.111 CORONARY ARTERY DISEASE INVOLVING NATIVE CORONARY ARTERY OF NATIVE HEART WITH ANGINA PECTORIS WITH DOCUMENTED SPASM (HCC): Primary | ICD-10-CM

## 2020-08-31 PROCEDURE — 99213 OFFICE O/P EST LOW 20 MIN: CPT | Performed by: INTERNAL MEDICINE

## 2020-08-31 PROCEDURE — 93000 ELECTROCARDIOGRAM COMPLETE: CPT | Performed by: INTERNAL MEDICINE

## 2020-08-31 NOTE — PROGRESS NOTES
1. Have you been to the ER, urgent care clinic since your last visit? Hospitalized since your last visit? No    2. Have you seen or consulted any other health care providers outside of the [address] [place] [address] since your last visit? Include any pap smears or colon screening.  No    Chief Complaint   Patient presents with    Cholesterol Problem     6 mo f/u    Hypertension     6 mo f/u    Coronary Artery Disease     6 mo f/u    Leg Swelling     bilateral leg swelling

## 2020-08-31 NOTE — LETTER
8/31/20 Patient: Lauretta Skiff YOB: 1934 Date of Visit: 8/31/2020 Enrique Nance MD 
[address] [address] VIA In Basket Dear Enrique Nance MD, Thank you for referring Ms. Akshat Lemus to [address] [place] [address] for evaluation. My notes for this consultation are attached. If you have questions, please do not hesitate to call me. I look forward to following your patient along with you. Sincerely, Devante Pride MD

## 2020-10-29 ENCOUNTER — TELEPHONE (OUTPATIENT)
Dept: INTERNAL MEDICINE CLINIC | Age: 85
End: 2020-10-29

## 2020-10-29 NOTE — TELEPHONE ENCOUNTER
Pt called and scheduled next available vv on 11/6/20. Pt stated she believes she is having a \"psychological problem\" and is \"depressed\". Pt stated she is acting very \"out of character\" and has been \"not very nice to her daughter\". Pt was very upset that she was unable to speak with someone until next Friday and requested a call back with the nurse to discuss her concerns prior to the appt. Please return call at [phone].

## 2020-10-29 NOTE — TELEPHONE ENCOUNTER
Spoke with patient. She states she has felt depressed and has been ugly to her daughter and does not know why she was ugly to her. She feel very bad about this as they have not spoken since the incident. Patient admits to feeling isolated as \"all her friends are dead and gone\". Patient can be heard crying during conversation. States her behavior is out of character. She denies any s/s of a UTI and stated she has no intention to harm herself and that she \"will be ok until I can see her(Dr Wilson)\". She has a VV scheduled for 11/06/2020. Advised UC/ER if she feels like she is losing control or having dark thoughts. Patient stated understanding.

## 2020-10-30 ENCOUNTER — TELEPHONE (OUTPATIENT)
Dept: CARDIOLOGY CLINIC | Age: 85
End: 2020-10-30

## 2020-10-30 ENCOUNTER — TELEPHONE (OUTPATIENT)
Dept: INTERNAL MEDICINE CLINIC | Age: 85
End: 2020-10-30

## 2020-10-30 NOTE — TELEPHONE ENCOUNTER
Spoke to patient's daughter Danisha Hunter on HIPAA using 2 identifiers. Per daughter, she reported pt has been having:  Dizziness x 1-2 wks. No falls. Stays well hydrated  Fatigue x 1 day  Takes cardiac meds as prescribed. No bp to report. But will take it over the weekend and will call back on Monday. Also saw a note on LOV to do a repeat echo. Was not done. Daughter will also call on Monday to get echo scheduled.

## 2020-10-30 NOTE — TELEPHONE ENCOUNTER
Verified patients name and date of birth. Nikko King is on HIPPA. Ms Pedrito Branch states that patient has had complaint of tiredness and dizziness. Per Will Hernandez patient cannot see cardiologist until next week and patient has appt scheduled next week with Dr Gary Malin. Advised Er today based on symptoms and patients age and the fact that it is Friday afternoon. Also called patient and advised her of same. Patient stated understanding.

## 2020-10-30 NOTE — TELEPHONE ENCOUNTER
Pts daughter called saying her mother is feeling really tired, alittle dizzy, she was just calling to see if its anything dealing with a blockage her mother still has.  She also said she was going to call PCP as well

## 2020-10-30 NOTE — TELEPHONE ENCOUNTER
pts daughter Gema Sagastume called and asked for a call back, said her mother is very tired and dizzy, they called the cardilologist and they said to call the pcp.  [phone]

## 2020-11-02 ENCOUNTER — TELEPHONE (OUTPATIENT)
Dept: CARDIOLOGY CLINIC | Age: 85
End: 2020-11-02

## 2020-11-02 NOTE — TELEPHONE ENCOUNTER
Spoke to patient's daughter Laya Perla on HIPAA using 2 identifiers. She reported the following vital sign:    10/31/20:  BP:146/93, HR 65    Confusion now occurring daily. Has phone call visit with PCP on 11/6/20. Will address to PCP    Please advise about dizziness from previous thread.

## 2020-11-02 NOTE — TELEPHONE ENCOUNTER
Please call patients daughter Chantal Canchola she has vitals to give you     [phone]    Thanks  Gladies Space

## 2020-11-04 NOTE — TELEPHONE ENCOUNTER
Spoke to patient's daughterJonathan on HIPAA using 2 identifiers. Per Leonard Chaney NP she was made aware her vitals don't suggest an etiology for confusion. Agree with discussing with PCP. BP seems controlled at home. she would not recommend med changes from cardiac standpoint, but does need to get echo done. Defer dizziness to PCP to workup at this time. Patient's daughter stated they will be seeing PCP on 11/6/20 will address their concerns.

## 2020-11-06 ENCOUNTER — VIRTUAL VISIT (OUTPATIENT)
Dept: INTERNAL MEDICINE CLINIC | Age: 85
End: 2020-11-06
Payer: MEDICARE

## 2020-11-06 VITALS
WEIGHT: 161 LBS | SYSTOLIC BLOOD PRESSURE: 146 MMHG | BODY MASS INDEX: 27.49 KG/M2 | DIASTOLIC BLOOD PRESSURE: 98 MMHG | HEIGHT: 64 IN

## 2020-11-06 DIAGNOSIS — F41.1 GENERALIZED ANXIETY DISORDER: Primary | ICD-10-CM

## 2020-11-06 PROCEDURE — 99441 PR PHYS/QHP TELEPHONE EVALUATION 5-10 MIN: CPT | Performed by: INTERNAL MEDICINE

## 2020-11-06 RX ORDER — BUSPIRONE HYDROCHLORIDE 5 MG/1
2.5 TABLET ORAL 2 TIMES DAILY
Qty: 30 TAB | Refills: 1 | Status: SHIPPED | OUTPATIENT
Start: 2020-11-06 | End: 2020-12-17

## 2020-11-06 NOTE — PROGRESS NOTES
Sharon Yoon is a 80 y.o. female, evaluated via audio-only technology on 11/6/2020 for Depression and Spells (confused, out of it)  . Assessment & Plan:     Diagnoses and all orders for this visit:    1. Generalized anxiety disorder  Will avoid benzodiazepines and hydroxyzine for management of anxiety. Start low-dose Buspar.  -     Start busPIRone (BUSPAR) 5 mg tablet; Take 0.5 Tabs by mouth two (2) times a day. For anxiety. Follow-up and Dispositions    · Return in about 1 month (around 12/6/2020), or if symptoms worsen or fail to improve, for Anxiety. 12  Subjective:     Patient complains of anxiety. Had an episode on 10/21/20 in which she had a flare of neck pain followed by feeling very anxious with mild sadness. Was disoriented and confused for a few minutes. Did not take any medications prior to this episode. Since then has been feeling anxious every day but no further confusion. Denies SI. Felt like herself yesterday and today but her daughter recommended she schedule an appointment with me. Stressors: being shut in due to COVID-19 pandemic and health especially cardiac issues and neck pain. Had 2 cardiac stents placed in 2018 and is worried that she might need another one. Scheduled for cardiac evaluation on 11/17/20. Prior to Admission medications    Medication Sig Start Date End Date Taking?  Authorizing Provider   amLODIPine (NORVASC) 5 mg tablet Take 1 tablet by mouth once daily 11/2/20  Yes Bianka Torre MD   cloNIDine HCL (CATAPRES) 0.1 mg tablet Take 1 tablet by mouth twice daily 11/2/20  Yes Bianka Torre MD   losartan (COZAAR) 100 mg tablet Take 1 tablet by mouth once daily 11/2/20  Yes Bianka Torre MD   metoprolol succinate (TOPROL-XL) 25 mg XL tablet Take 1/2 (one-half) tablet by mouth once daily 11/2/20  Yes Bianka Torre MD   furosemide (LASIX) 20 mg tablet Take 1 tablet by mouth once daily 11/2/20  Yes Kyle Chaparro NP   atorvastatin (LIPITOR) 40 mg tablet Take 1 tablet by mouth once daily 5/5/20  Yes Marcello Ornelas NP   aspirin 81 mg chewable tablet Take 1 Tab by mouth daily. 6/19/18  Yes Joaquina Slater NP   acetaminophen (TYLENOL ARTHRITIS PAIN) 650 mg TbER Take 650 mg by mouth as needed. Yes Provider, Historical   polyethylene glycol (MIRALAX) 17 gram/dose powder Take 17 g by mouth as needed. 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 9) E66.3         No flowsheet data found. Hunter Sagastume, who was evaluated through a patient-initiated, synchronous (real-time) audio only encounter, and/or her healthcare decision maker, is aware that it is a billable service, with coverage as determined by her insurance carrier. She provided verbal consent to proceed: Yes. She has not had a related appointment within my department in the past 7 days or scheduled within the next 24 hours.       Total Time: minutes: 5-10 minutes    Gino Walden MD

## 2020-11-06 NOTE — PROGRESS NOTES
José Teague  Identified pt with two pt identifiers(name and ). Chief Complaint   Patient presents with    Depression    Spells     confused, out of it       Reviewed record In preparation for visit and have obtained necessary documentation. Advanced directive / living will on file. 1. Have you been to the ER, urgent care clinic or hospitalized since your last visit? No     2. Have you seen or consulted any other health care providers outside of the [address] [place] [address] since your last visit? Include any pap smears or colon screening. No    Vitals reviewed with provider.     Health Maintenance reviewed:     Health Maintenance Due   Topic    Shingrix Vaccine Age 50> (1 of 2)    GLAUCOMA SCREENING Q2Y     Flu Vaccine (1)          Wt Readings from Last 3 Encounters:   20 161 lb (73 kg)   20 160 lb 3.2 oz (72.7 kg)   20 161 lb (73 kg)        Temp Readings from Last 3 Encounters:   20 98.2 °F (36.8 °C) (Oral)   19 97.9 °F (36.6 °C) (Oral)   19 97.9 °F (36.6 °C) (Oral)        BP Readings from Last 3 Encounters:   20 (!) 146/98   20 (!) 184/108   20 136/83        Pulse Readings from Last 3 Encounters:   20 87   20 76   20 63        Vitals:    20 0700   BP: (!) 146/98   Weight: 161 lb (73 kg)   Height: 5' 4\" (1.626 m)   PainSc:   0 - No pain          Learning Assessment:   :       Learning Assessment 2014   PRIMARY LEARNER Patient   HIGHEST LEVEL OF EDUCATION - PRIMARY LEARNER  GRADUATED HIGH SCHOOL OR GED   BARRIERS PRIMARY LEARNER NONE   CO-LEARNER CAREGIVER No   PRIMARY LANGUAGE ENGLISH   LEARNER PREFERENCE PRIMARY DEMONSTRATION   ANSWERED BY patient   RELATIONSHIP SELF        Depression Screening:   :       3 most recent PHQ Screens 2020   Little interest or pleasure in doing things Several days   Feeling down, depressed, irritable, or hopeless Several days   Total Score PHQ 2 2   Trouble falling or staying asleep, or sleeping too much -   Feeling tired or having little energy -   Poor appetite, weight loss, or overeating -   Feeling bad about yourself - or that you are a failure or have let yourself or your family down -   Trouble concentrating on things such as school, work, reading, or watching TV -   Moving or speaking so slowly that other people could have noticed; or the opposite being so fidgety that others notice -   Thoughts of being better off dead, or hurting yourself in some way -   PHQ 9 Score -        Fall Risk Assessment:   :       Fall Risk Assessment, last 12 mths 8/31/2020   Able to walk? Yes   Fall in past 12 months? No   Fall with injury? -   Number of falls in past 12 months -   Fall Risk Score -        Abuse Screening:   :       Abuse Screening Questionnaire 8/12/2020 1/16/2019 4/16/2018 1/19/2017 10/26/2015 8/5/2014   Do you ever feel afraid of your partner? N N N N N N   Are you in a relationship with someone who physically or mentally threatens you? N N N N N N   Is it safe for you to go home?  Y Y Y Y Y Y        ADL Screening:   :       ADL Assessment 8/12/2020   Feeding yourself No Help Needed   Getting from bed to chair No Help Needed   Getting dressed No Help Needed   Bathing or showering No Help Needed   Walk across the room (includes cane/walker) No Help Needed   Using the telphone No Help Needed   Taking your medications No Help Needed   Preparing meals No Help Needed   Managing money (expenses/bills) No Help Needed   Moderately strenuous housework (laundry) No Help Needed   Shopping for personal items (toiletries/medicines) No Help Needed   Shopping for groceries No Help Needed   Driving No Help Needed   Climbing a flight of stairs No Help Needed   Getting to places beyond walking distances No Help Needed

## 2020-11-10 ENCOUNTER — TELEPHONE (OUTPATIENT)
Dept: INTERNAL MEDICINE CLINIC | Age: 85
End: 2020-11-10

## 2020-11-10 NOTE — TELEPHONE ENCOUNTER
Pt called to report that the anxiety medication that she was recently prescribed is making her dizzy and she wants to stop taking it. Pt stated her anxiety has improved. Please give pt a call back at [phone] to discuss. Pt also stated she has an upcoming appt w/ cardiology and wanted to let Dr. Cain Overall.

## 2020-11-10 NOTE — TELEPHONE ENCOUNTER
Verified patients name and date of birth. Patient states that the buspar has made her feel dizzy and \"uncomfortable\" feeling. Patient advised she has stopped the buspar as her anxiety has \"decompressed\" and feels better and has anxiety under control now. Patient also states she has appt with Dr Bella Pierson on Nov 19th.

## 2020-11-17 ENCOUNTER — ANCILLARY PROCEDURE (OUTPATIENT)
Dept: CARDIOLOGY CLINIC | Age: 85
End: 2020-11-17
Payer: MEDICARE

## 2020-11-17 VITALS
HEIGHT: 64 IN | DIASTOLIC BLOOD PRESSURE: 98 MMHG | BODY MASS INDEX: 27.49 KG/M2 | SYSTOLIC BLOOD PRESSURE: 146 MMHG | WEIGHT: 161 LBS

## 2020-11-17 PROCEDURE — 93306 TTE W/DOPPLER COMPLETE: CPT | Performed by: INTERNAL MEDICINE

## 2020-11-19 LAB
ECHO AO ASC DIAM: 3.54 CM
ECHO AO ROOT DIAM: 3.01 CM
ECHO AV PEAK GRADIENT: 5.95 MMHG
ECHO AV PEAK VELOCITY: 122 CM/S
ECHO EST RA PRESSURE: 3 MMHG
ECHO LA VOL 2C: 47.14 ML (ref 22–52)
ECHO LA VOL 4C: 31.99 ML (ref 22–52)
ECHO LA VOLUME INDEX A2C: 26.43 ML/M2 (ref 16–28)
ECHO LA VOLUME INDEX A4C: 17.93 ML/M2 (ref 16–28)
ECHO LV INTERNAL DIMENSION DIASTOLIC: 4.63 CM (ref 3.9–5.3)
ECHO LV INTERNAL DIMENSION SYSTOLIC: 2.8 CM
ECHO LV ISOVOLUMETRIC RELAXATION TIME (IVRT): 92.42 MS
ECHO LV IVSD: 1.13 CM (ref 0.6–0.9)
ECHO LV MASS 2D: 184.3 G (ref 67–162)
ECHO LV MASS INDEX 2D: 103.3 G/M2 (ref 43–95)
ECHO LV POSTERIOR WALL DIASTOLIC: 1.08 CM (ref 0.6–0.9)
ECHO LVOT PEAK GRADIENT: 4.52 MMHG
ECHO LVOT PEAK VELOCITY: 106.35 CM/S
ECHO MV "A" WAVE DURATION: 173.75 MS
ECHO MV A VELOCITY: 124.65 CENTIMETER/SECOND
ECHO MV E DECELERATION TIME (DT): 305.71 MS
ECHO MV E VELOCITY: 93.89 CENTIMETER/SECOND
ECHO PVEIN A DURATION: 182.99 MS
ECHO PVEIN A VELOCITY: 29.85 CM/S
ECHO RIGHT VENTRICULAR SYSTOLIC PRESSURE (RVSP): 24.35 MMHG
ECHO TV REGURGITANT MAX VELOCITY: 231.02 CM/S
ECHO TV REGURGITANT PEAK GRADIENT: 21.35 MMHG

## 2020-11-19 NOTE — PROGRESS NOTES
Spoke to patient using 2 identifiers. Per Wayne Garcia NP, patient was made aware echocardiogram is normal, ejection fraction 55-60%. No concern for heart failure at this time. Valves working appropriately. Patient verbalized understanding.

## 2020-11-21 LAB
25(OH)D3+25(OH)D2 SERPL-MCNC: 24.7 NG/ML (ref 30–100)
ALBUMIN SERPL-MCNC: 4.5 G/DL (ref 3.6–4.6)
ALBUMIN/GLOB SERPL: 1.5 {RATIO} (ref 1.2–2.2)
ALP SERPL-CCNC: 124 IU/L (ref 39–117)
ALT SERPL-CCNC: 17 IU/L (ref 0–32)
AST SERPL-CCNC: 25 IU/L (ref 0–40)
BASOPHILS # BLD AUTO: 0.1 X10E3/UL (ref 0–0.2)
BASOPHILS NFR BLD AUTO: 1 %
BILIRUB SERPL-MCNC: 0.5 MG/DL (ref 0–1.2)
BUN SERPL-MCNC: 13 MG/DL (ref 8–27)
BUN/CREAT SERPL: 16 (ref 12–28)
CALCIUM SERPL-MCNC: 10.1 MG/DL (ref 8.7–10.3)
CHLORIDE SERPL-SCNC: 101 MMOL/L (ref 96–106)
CHOLEST SERPL-MCNC: 128 MG/DL (ref 100–199)
CO2 SERPL-SCNC: 25 MMOL/L (ref 20–29)
CREAT SERPL-MCNC: 0.83 MG/DL (ref 0.57–1)
EOSINOPHIL # BLD AUTO: 0.3 X10E3/UL (ref 0–0.4)
EOSINOPHIL NFR BLD AUTO: 7 %
ERYTHROCYTE [DISTWIDTH] IN BLOOD BY AUTOMATED COUNT: 12.9 % (ref 11.7–15.4)
EST. AVERAGE GLUCOSE BLD GHB EST-MCNC: 120 MG/DL
GLOBULIN SER CALC-MCNC: 3 G/DL (ref 1.5–4.5)
GLUCOSE SERPL-MCNC: 87 MG/DL (ref 65–99)
HBA1C MFR BLD: 5.8 % (ref 4.8–5.6)
HCT VFR BLD AUTO: 42.6 % (ref 34–46.6)
HDLC SERPL-MCNC: 41 MG/DL
HGB BLD-MCNC: 14.2 G/DL (ref 11.1–15.9)
IMM GRANULOCYTES # BLD AUTO: 0 X10E3/UL (ref 0–0.1)
IMM GRANULOCYTES NFR BLD AUTO: 0 %
LDLC SERPL CALC-MCNC: 70 MG/DL (ref 0–99)
LYMPHOCYTES # BLD AUTO: 1.3 X10E3/UL (ref 0.7–3.1)
LYMPHOCYTES NFR BLD AUTO: 31 %
MCH RBC QN AUTO: 30 PG (ref 26.6–33)
MCHC RBC AUTO-ENTMCNC: 33.3 G/DL (ref 31.5–35.7)
MCV RBC AUTO: 90 FL (ref 79–97)
MONOCYTES # BLD AUTO: 0.5 X10E3/UL (ref 0.1–0.9)
MONOCYTES NFR BLD AUTO: 12 %
NEUTROPHILS # BLD AUTO: 2.1 X10E3/UL (ref 1.4–7)
NEUTROPHILS NFR BLD AUTO: 49 %
PLATELET # BLD AUTO: 249 X10E3/UL (ref 150–450)
POTASSIUM SERPL-SCNC: 4.2 MMOL/L (ref 3.5–5.2)
PROT SERPL-MCNC: 7.5 G/DL (ref 6–8.5)
RBC # BLD AUTO: 4.74 X10E6/UL (ref 3.77–5.28)
SODIUM SERPL-SCNC: 140 MMOL/L (ref 134–144)
T4 FREE SERPL-MCNC: 1.17 NG/DL (ref 0.82–1.77)
TRIGL SERPL-MCNC: 90 MG/DL (ref 0–149)
TSH SERPL DL<=0.005 MIU/L-ACNC: 7.95 UIU/ML (ref 0.45–4.5)
VLDLC SERPL CALC-MCNC: 17 MG/DL (ref 5–40)
WBC # BLD AUTO: 4.3 X10E3/UL (ref 3.4–10.8)

## 2020-11-27 PROBLEM — E55.9 VITAMIN D DEFICIENCY: Status: ACTIVE | Noted: 2020-11-27

## 2020-11-27 NOTE — PROGRESS NOTES
Will discuss results at 12/17/20 appt. Normal kidney and liver tests, blood counts, diabetes screening test, and cholesterol. Vitamin D level a little low at 24.7. Recommend taking OTC vitamin D 2000 units daily. Thyroid tests show you have mild hypothyroidism. Plan to start on levothyroxine 25 mcg daily.

## 2020-12-17 ENCOUNTER — VIRTUAL VISIT (OUTPATIENT)
Dept: INTERNAL MEDICINE CLINIC | Age: 85
End: 2020-12-17
Payer: MEDICARE

## 2020-12-17 DIAGNOSIS — M50.90 CERVICAL NECK PAIN WITH EVIDENCE OF DISC DISEASE: ICD-10-CM

## 2020-12-17 DIAGNOSIS — F41.9 ANXIETY: Primary | ICD-10-CM

## 2020-12-17 DIAGNOSIS — E03.9 ACQUIRED HYPOTHYROIDISM: ICD-10-CM

## 2020-12-17 PROCEDURE — 99441 PR PHYS/QHP TELEPHONE EVALUATION 5-10 MIN: CPT | Performed by: INTERNAL MEDICINE

## 2020-12-17 NOTE — PROGRESS NOTES
Mirza Jorgensen is a 80 y.o. female, evaluated via audio-only technology on 12/17/2020 for Anxiety  . Assessment & Plan:     Discussed lab results from 11/20/20. Normal kidney and liver tests, blood counts, diabetes screening test, and cholesterol.  Vitamin D level a little low at 24.7. Recommend taking OTC vitamin D 2000 units daily.  Thyroid tests show you have mild hypothyroidism.  Plan to start on levothyroxine 25 mcg daily. Diagnoses and all orders for this visit:    1. Anxiety  No further episodes of anxiety. Better on no medication. 2. Cervical neck pain with evidence of disc disease  Has cervical spinal stenosis. Continue present management. 3. Acquired hypothyroidism  Mild hypothyroidism. Asymptomatic other than cold intolerance. She declined taking levothyroxine because does not want to take any more medications. Will monitor labs and symptoms. Follow-up and Dispositions    · Return in about 6 months (around 6/17/2021), or if symptoms worsen or fail to improve, for HTN, CAD, neck pain . 12  Subjective:     Presents for 1 month follow up on anxiety. Was unable to tolerate Buspar, mad her dizzy. Thinks intense cervical stenosis pain deandre have caused her episode of anxiety and confusion. Takes Tylenol as needed for cervical pain. Also uses CBD ointment from a compounded pharmacy; feels like it helps a lot. Had echocardiogram on 11/17/20; EF 55-60%, no valvular disease. No new complaints. Denies CP, SOB, or leg swelling. Stays active; Still drives. Thyroid ROS: has some cold intolerance, denies fatigue, weight changes, heat intolerance, bowel/skin changes or CVS symptoms. Has daughter with Grave's disease. Prior to Admission medications    Medication Sig Start Date End Date Taking?  Authorizing Provider   amLODIPine (NORVASC) 5 mg tablet Take 1 tablet by mouth once daily 11/2/20  Yes Aureliano Alberts MD   cloNIDine HCL (CATAPRES) 0.1 mg tablet Take 1 tablet by mouth twice daily 11/2/20  Yes Vernon Euceda MD   losartan (COZAAR) 100 mg tablet Take 1 tablet by mouth once daily 11/2/20  Yes Vernon Euceda MD   metoprolol succinate (TOPROL-XL) 25 mg XL tablet Take 1/2 (one-half) tablet by mouth once daily 11/2/20  Yes Vernon Euceda MD   furosemide (LASIX) 20 mg tablet Take 1 tablet by mouth once daily 11/2/20  Yes Marcello Ornelas NP   atorvastatin (LIPITOR) 40 mg tablet Take 1 tablet by mouth once daily 5/5/20  Yes Marcello Ornelas NP   aspirin 81 mg chewable tablet Take 1 Tab by mouth daily. 6/19/18  Yes Joaquina Slater NP   acetaminophen (TYLENOL ARTHRITIS PAIN) 650 mg TbER Take 650 mg by mouth as needed. Yes Provider, Historical   polyethylene glycol (MIRALAX) 17 gram/dose powder Take 17 g by mouth as needed. 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 9) E66.3    Vitamin D deficiency E55.9       No flowsheet data found. Hunter Sagastume, who was evaluated through a patient-initiated, synchronous (real-time) audio only encounter, and/or her healthcare decision maker, is aware that it is a billable service, with coverage as determined by her insurance carrier. She provided verbal consent to proceed: Yes. She has not had a related appointment within my department in the past 7 days or scheduled within the next 24 hours.       Total Time: minutes: 5-10 minutes    Gino Walden MD

## 2020-12-17 NOTE — PROGRESS NOTES
Dawn Barber  Identified pt with two pt identifiers(name and ). Chief Complaint   Patient presents with    Anxiety       Reviewed record In preparation for visit and have obtained necessary documentation. Advanced directive / living will on file. 1. Have you been to the ER, urgent care clinic or hospitalized since your last visit? No     2. Have you seen or consulted any other health care providers outside of the [address] [place] [address] since your last visit? Include any pap smears or colon screening. No    Vitals reviewed with provider.     Health Maintenance reviewed:     Health Maintenance Due   Topic    Shingrix Vaccine Age 50> (1 of 2)    GLAUCOMA SCREENING Q2Y     Flu Vaccine (1)          Wt Readings from Last 3 Encounters:   20 161 lb (73 kg)   20 161 lb (73 kg)   20 160 lb 3.2 oz (72.7 kg)        Temp Readings from Last 3 Encounters:   20 98.2 °F (36.8 °C) (Oral)   19 97.9 °F (36.6 °C) (Oral)   19 97.9 °F (36.6 °C) (Oral)        BP Readings from Last 3 Encounters:   20 (!) 146/98   20 (!) 146/98   20 (!) 184/108        Pulse Readings from Last 3 Encounters:   20 87   20 76   20 63        Vitals:    20 0700   PainSc:   0 - No pain          Learning Assessment:   :       Learning Assessment 2014   PRIMARY LEARNER Patient   HIGHEST LEVEL OF EDUCATION - PRIMARY LEARNER  GRADUATED HIGH SCHOOL OR GED   BARRIERS PRIMARY LEARNER NONE   CO-LEARNER CAREGIVER No   PRIMARY LANGUAGE ENGLISH   LEARNER PREFERENCE PRIMARY DEMONSTRATION   ANSWERED BY patient   RELATIONSHIP SELF        Depression Screening:   :       3 most recent PHQ Screens 2020   Little interest or pleasure in doing things Several days   Feeling down, depressed, irritable, or hopeless Several days   Total Score PHQ 2 2   Trouble falling or staying asleep, or sleeping too much -   Feeling tired or having little energy -   Poor appetite, weight loss, or overeating -   Feeling bad about yourself - or that you are a failure or have let yourself or your family down -   Trouble concentrating on things such as school, work, reading, or watching TV -   Moving or speaking so slowly that other people could have noticed; or the opposite being so fidgety that others notice -   Thoughts of being better off dead, or hurting yourself in some way -   PHQ 9 Score -        Fall Risk Assessment:   :       Fall Risk Assessment, last 12 mths 8/31/2020   Able to walk? Yes   Fall in past 12 months? No   Fall with injury? -   Number of falls in past 12 months -   Fall Risk Score -        Abuse Screening:   :       Abuse Screening Questionnaire 8/12/2020 1/16/2019 4/16/2018 1/19/2017 10/26/2015 8/5/2014   Do you ever feel afraid of your partner? N N N N N N   Are you in a relationship with someone who physically or mentally threatens you? N N N N N N   Is it safe for you to go home?  Y Y Y Y Y Y        ADL Screening:   :       ADL Assessment 8/12/2020   Feeding yourself No Help Needed   Getting from bed to chair No Help Needed   Getting dressed No Help Needed   Bathing or showering No Help Needed   Walk across the room (includes cane/walker) No Help Needed   Using the telphone No Help Needed   Taking your medications No Help Needed   Preparing meals No Help Needed   Managing money (expenses/bills) No Help Needed   Moderately strenuous housework (laundry) No Help Needed   Shopping for personal items (toiletries/medicines) No Help Needed   Shopping for groceries No Help Needed   Driving No Help Needed   Climbing a flight of stairs No Help Needed   Getting to places beyond walking distances No Help Needed

## 2021-01-15 ENCOUNTER — TELEPHONE (OUTPATIENT)
Dept: INTERNAL MEDICINE CLINIC | Age: 86
End: 2021-01-15

## 2021-01-15 NOTE — TELEPHONE ENCOUNTER
Pt is planning to get the covid vaccine, but wanted to check w/ provider that she has no underlying issues or conditions that would prevent her from getting it. Please call back at [phone].

## 2021-01-15 NOTE — TELEPHONE ENCOUNTER
Left message for patient to return call. Physicians are recommending the Covid vaccine to all patients except those with known allergic reaction to vaccines.

## 2021-01-15 NOTE — TELEPHONE ENCOUNTER
Pt called in, HIPAA verified by two patient identifiers, and wanted to know if her provider recommends the Covid-19 vaccine. Advised pt that all physicians are recommending the Covid vaccine unless there is a hx of reactions to vaccines. Pt verified understanding.

## 2021-03-01 ENCOUNTER — OFFICE VISIT (OUTPATIENT)
Dept: CARDIOLOGY CLINIC | Age: 86
End: 2021-03-01
Payer: MEDICARE

## 2021-03-01 VITALS
SYSTOLIC BLOOD PRESSURE: 156 MMHG | OXYGEN SATURATION: 98 % | HEART RATE: 67 BPM | DIASTOLIC BLOOD PRESSURE: 92 MMHG | RESPIRATION RATE: 18 BRPM | BODY MASS INDEX: 27.31 KG/M2 | WEIGHT: 160 LBS | HEIGHT: 64 IN

## 2021-03-01 DIAGNOSIS — I12.9 BENIGN HYPERTENSION WITH CHRONIC KIDNEY DISEASE, STAGE II: ICD-10-CM

## 2021-03-01 DIAGNOSIS — N18.2 BENIGN HYPERTENSION WITH CHRONIC KIDNEY DISEASE, STAGE II: ICD-10-CM

## 2021-03-01 DIAGNOSIS — E78.00 HYPERCHOLESTEROLEMIA: ICD-10-CM

## 2021-03-01 DIAGNOSIS — I25.10 CORONARY ARTERY DISEASE INVOLVING NATIVE CORONARY ARTERY OF NATIVE HEART WITHOUT ANGINA PECTORIS: Primary | ICD-10-CM

## 2021-03-01 DIAGNOSIS — I25.10 ASHD (ARTERIOSCLEROTIC HEART DISEASE): ICD-10-CM

## 2021-03-01 PROCEDURE — 93000 ELECTROCARDIOGRAM COMPLETE: CPT | Performed by: INTERNAL MEDICINE

## 2021-03-01 PROCEDURE — G8419 CALC BMI OUT NRM PARAM NOF/U: HCPCS | Performed by: INTERNAL MEDICINE

## 2021-03-01 PROCEDURE — G8510 SCR DEP NEG, NO PLAN REQD: HCPCS | Performed by: INTERNAL MEDICINE

## 2021-03-01 PROCEDURE — G8427 DOCREV CUR MEDS BY ELIG CLIN: HCPCS | Performed by: INTERNAL MEDICINE

## 2021-03-01 PROCEDURE — 99214 OFFICE O/P EST MOD 30 MIN: CPT | Performed by: INTERNAL MEDICINE

## 2021-03-01 PROCEDURE — 1090F PRES/ABSN URINE INCON ASSESS: CPT | Performed by: INTERNAL MEDICINE

## 2021-03-01 PROCEDURE — G8536 NO DOC ELDER MAL SCRN: HCPCS | Performed by: INTERNAL MEDICINE

## 2021-03-01 PROCEDURE — 1101F PT FALLS ASSESS-DOCD LE1/YR: CPT | Performed by: INTERNAL MEDICINE

## 2021-03-01 NOTE — LETTER
3/1/2021 Patient: Torrie Hess YOB: 1934 Date of Visit: 3/1/2021 Bib Martines MD 
[address] [address] [address] [employer] Dear Bib Martines MD, Thank you for referring Ms. Haylee Osei to [address] [place] [address] for evaluation. My notes for this consultation are attached. If you have questions, please do not hesitate to call me. I look forward to following your patient along with you. Sincerely, Umu Redding MD

## 2021-03-01 NOTE — PROGRESS NOTES
Madelyn Gill, FNP-BC    Subjective/HPI:     Makayla Dickson is a 80 y.o. female is here for routine f/u. She has a PMHx of CAD, HTN, HLD and GERD. She is doing well. She notes issues with anxiety that drives up her blood pressures. She tried BuSpar but this made her dizzy. Blood pressure is optimal at home. Otherwise denies complaints of chest pains, dizziness, orthopnea, PND or edema. She denies palpitation symptoms or shortness of breath. Current Outpatient Medications on File Prior to Visit   Medication Sig Dispense Refill    amLODIPine (NORVASC) 5 mg tablet Take 1 tablet by mouth once daily 90 Tab 3    cloNIDine HCL (CATAPRES) 0.1 mg tablet Take 1 tablet by mouth twice daily (Patient taking differently: Take 0.1 mg by mouth two (2) times a day.) 180 Tab 3    losartan (COZAAR) 100 mg tablet Take 1 tablet by mouth once daily 90 Tab 3    metoprolol succinate (TOPROL-XL) 25 mg XL tablet Take 1/2 (one-half) tablet by mouth once daily 45 Tab 3    furosemide (LASIX) 20 mg tablet Take 1 tablet by mouth once daily 90 Tab 3    atorvastatin (LIPITOR) 40 mg tablet Take 1 tablet by mouth once daily 90 Tab 3    aspirin 81 mg chewable tablet Take 1 Tab by mouth daily. 30 Tab 0    acetaminophen (TYLENOL ARTHRITIS PAIN) 650 mg TbER Take 650 mg by mouth as needed.  polyethylene glycol (MIRALAX) 17 gram/dose powder Take 17 g by mouth as needed. No current facility-administered medications on file prior to visit. Review of Symptoms:    Review of Systems   Constitutional: Negative for chills, fever and weight loss. HENT: Negative for nosebleeds. Eyes: Negative for blurred vision and double vision. Respiratory: Negative for cough, shortness of breath and wheezing. Cardiovascular: Negative for chest pain, palpitations, orthopnea, leg swelling and PND. Gastrointestinal: Negative for abdominal pain, blood in stool, diarrhea, nausea and vomiting. Musculoskeletal: Negative for joint pain. Skin: Negative for rash. Neurological: Negative for dizziness, tingling and loss of consciousness. Endo/Heme/Allergies: Does not bruise/bleed easily. Physical Exam:      General: Well developed, in no acute distress, cooperative and alert  Heart:  reg rate and rhythm; normal S1/S2; no murmurs, no gallops or rubs. Respiratory: Clear bilaterally x 4, no wheezing or rales  Extremities:  Normal cap refill, no cyanosis, atraumatic. No edema. Vascular: 2+ pulses symmetric in all extremities    Vitals:    03/01/21 0912 03/01/21 0921   BP: (!) 160/98 (!) 156/92   Pulse: 67    Resp: 18    SpO2: 98%    Weight: 160 lb (72.6 kg)    Height: 5' 4\" (1.626 m)        ECG: sinus rhythm; LBBB     Assessment:       ICD-10-CM ICD-9-CM    1. Coronary artery disease involving native coronary artery of native heart without angina pectoris  I25.10 414.01    2. Benign hypertension with chronic kidney disease, stage II  I12.9 403.10     N18.2 585.2    3. Hypercholesterolemia  E78.00 272.0    4. ASHD (arteriosclerotic heart disease)  I25.10 414.00 AMB POC EKG ROUTINE W/ 12 LEADS, INTER & REP        Plan:     1. Coronary artery disease involving native coronary artery of native heart without angina pectoris  Hx of JAMISON to RCA and LAD in 7/2018  Without anginal or anginal equivalent symptoms  Echo done 11/2020 with preserved LVEF 55-60% with no significant valvular pathology  Continue Toprol, amlodipine, ASA and statin     2. Benign hypertension with chronic kidney disease, stage II  BP controlled. Continue anti-hypertensive therapy and low sodium diet     3. Hypercholesterolemia  LDL 70 in 11/2020  Continue statin therapy and low fat, low cholesterol diet  Lipids managed by PCP    F/u with Dr. Kylah Branch in 1 year    Diana Rubio NP        [facility]    3/1/2021         Patient seen, examined by me personally. Plan discussed as detailed.  Agree with note as outlined by NP. I confirm findings in history and physical exam. My independent physical exam reveals: Physical Exam   Constitutional: She is oriented to person, place, and time. She appears well-developed and well-nourished. HENT:   Head: Normocephalic. Eyes: Pupils are equal, round, and reactive to light. Neck: Normal range of motion. Cardiovascular: Normal heart sounds. Pulmonary/Chest: Breath sounds normal.   Musculoskeletal:         General: No edema. Neurological: She is oriented to person, place, and time. Skin: Skin is warm. Psychiatric: She has a normal mood and affect. Discussed elevated BP. She had a TIA like episode few weeks ago. Advised that it is very important to keep her BP lowe. She will monitor. Advised to go to ER if recurrent symptoms. No additional findings noted. Agree with plan as outlined above.      Jennifer Campuzano MD

## 2021-03-15 ENCOUNTER — OFFICE VISIT (OUTPATIENT)
Dept: INTERNAL MEDICINE CLINIC | Age: 86
End: 2021-03-15
Payer: MEDICARE

## 2021-03-15 VITALS
RESPIRATION RATE: 16 BRPM | WEIGHT: 157.4 LBS | HEART RATE: 69 BPM | TEMPERATURE: 98.2 F | SYSTOLIC BLOOD PRESSURE: 162 MMHG | OXYGEN SATURATION: 96 % | BODY MASS INDEX: 26.87 KG/M2 | DIASTOLIC BLOOD PRESSURE: 90 MMHG | HEIGHT: 64 IN

## 2021-03-15 DIAGNOSIS — I25.111 CORONARY ARTERY DISEASE INVOLVING NATIVE CORONARY ARTERY OF NATIVE HEART WITH ANGINA PECTORIS WITH DOCUMENTED SPASM (HCC): ICD-10-CM

## 2021-03-15 DIAGNOSIS — I10 HYPERTENSION, ESSENTIAL: ICD-10-CM

## 2021-03-15 DIAGNOSIS — R41.3 MEMORY LOSS: ICD-10-CM

## 2021-03-15 DIAGNOSIS — E03.9 ACQUIRED HYPOTHYROIDISM: Primary | ICD-10-CM

## 2021-03-15 PROCEDURE — 1101F PT FALLS ASSESS-DOCD LE1/YR: CPT | Performed by: INTERNAL MEDICINE

## 2021-03-15 PROCEDURE — G8427 DOCREV CUR MEDS BY ELIG CLIN: HCPCS | Performed by: INTERNAL MEDICINE

## 2021-03-15 PROCEDURE — G8432 DEP SCR NOT DOC, RNG: HCPCS | Performed by: INTERNAL MEDICINE

## 2021-03-15 PROCEDURE — G8419 CALC BMI OUT NRM PARAM NOF/U: HCPCS | Performed by: INTERNAL MEDICINE

## 2021-03-15 PROCEDURE — 1090F PRES/ABSN URINE INCON ASSESS: CPT | Performed by: INTERNAL MEDICINE

## 2021-03-15 PROCEDURE — 99214 OFFICE O/P EST MOD 30 MIN: CPT | Performed by: INTERNAL MEDICINE

## 2021-03-15 PROCEDURE — G8536 NO DOC ELDER MAL SCRN: HCPCS | Performed by: INTERNAL MEDICINE

## 2021-03-15 RX ORDER — METOPROLOL SUCCINATE 25 MG/1
25 TABLET, EXTENDED RELEASE ORAL DAILY
Qty: 45 TAB | Refills: 3
Start: 2021-03-15 | End: 2021-10-04

## 2021-03-15 RX ORDER — LEVOTHYROXINE SODIUM 25 UG/1
25 TABLET ORAL
Qty: 90 TAB | Refills: 0 | Status: SHIPPED | OUTPATIENT
Start: 2021-03-15 | End: 2021-06-15 | Stop reason: ALTCHOICE

## 2021-03-15 NOTE — PROGRESS NOTES
CC:   Chief Complaint   Patient presents with    Medication Evaluation     Wants to discuss thyroid medication    Follow-up    Lethargy       HISTORY OF PRESENT ILLNESS  Sherrie Soriano is a 80 y.o. female. Presents for 3 month follow up evaluation. She has HTN with CKD stage 2, hypercholesterolemia, CAD s/p 2 stents in 7/18, GERD, chronic low back pain due to spinal stenosis s/p lumbar laminectomy in 1996, and osteopenia. At last clinic visit, patient was diagnosed with mild hypothyroidism. She declined medication at that time but is now willing to take it. Thyroid ROS: has cold intolerance, denies fatigue, weight changes, heat, bowel/skin changes or CVS symptoms. Taking medication as prescribed. Last TSH was high (11/20/20: 7.950)    Complains of mild memory changes and occasional \"brain fog\" that she thinks may be related to thyroid. Forgets why she went into a room. Her former eye doctor, Dr. Kobe hTomas, retired. Has appointment scheduled with Dr. Laurie Avila at [facility]. 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, N18.2    Spinal stenosis M48.00    Cervical neck pain with evidence of disc disease M50.90    GERD (gastroesophageal reflux disease) K21.9    Advance directive on file Z78.9    Osteopenia with high risk of fracture M85.80    Hypercholesterolemia E78.00    Coronary artery disease involving native coronary artery of native heart with angina pectoris with documented spasm ([facility] [address] I25.111    LBBB (left bundle branch block) I44.7    Overweight (BMI 25.0-29. 9) E66.3    Vitamin D deficiency E55.9     Past Medical History:   Diagnosis Date    CAD (coronary artery disease)     GERD (gastroesophageal reflux disease)     Hypercholesterolemia     Hypertension     Plantar fasciitis 3/1/2010    Rotator cuff rupture 3/12/2013    1/13  MRI shows complete tear of supraspinatus with retraction, other are OK  SOB (shortness of breath) 6/14/2018    Tubular adenoma of colon 8/4/2014 July 2014     Tubular adenoma of rectum 5/19/2019 5/19/19    UTI (urinary tract infection) 7/5/2018     Allergies   Allergen Reactions    Contrast Agent [Iodine] Hives    Penicillins Hives    Bystolic [Nebivolol] Other (comments)     abd pain    Cardizem [Diltiazem Hcl] Rash    Cardura [Doxazosin] Unknown (comments)    Codeine Nausea Only    Cymbalta [Duloxetine] Other (comments)     Abdominal pain, anxiety    Gabapentin Other (comments)     Made her feel crazy    Prinivil [Lisinopril] Unknown (comments)    Tekturna [Aliskiren] Other (comments)     abd cramps    Tenormin [Atenolol] Unknown (comments)       Current Outpatient Medications   Medication Sig Dispense Refill    OTHER CBD oil taking 2 drops twice a day for cervical stenosis      amLODIPine (NORVASC) 5 mg tablet Take 1 tablet by mouth once daily 90 Tab 3    cloNIDine HCL (CATAPRES) 0.1 mg tablet Take 1 tablet by mouth twice daily (Patient taking differently: Take 0.1 mg by mouth two (2) times a day.) 180 Tab 3    losartan (COZAAR) 100 mg tablet Take 1 tablet by mouth once daily 90 Tab 3    metoprolol succinate (TOPROL-XL) 25 mg XL tablet Take 1/2 (one-half) tablet by mouth once daily 45 Tab 3    furosemide (LASIX) 20 mg tablet Take 1 tablet by mouth once daily 90 Tab 3    atorvastatin (LIPITOR) 40 mg tablet Take 1 tablet by mouth once daily 90 Tab 3    aspirin 81 mg chewable tablet Take 1 Tab by mouth daily. 30 Tab 0    acetaminophen (TYLENOL ARTHRITIS PAIN) 650 mg TbER Take 650 mg by mouth as needed.  polyethylene glycol (MIRALAX) 17 gram/dose powder Take 17 g by mouth as needed.            PHYSICAL EXAM  Visit Vitals  BP (!) 162/90 (BP 1 Location: Left upper arm, BP Patient Position: Sitting, BP Cuff Size: Adult)   Pulse 69   Temp 98.2 °F (36.8 °C) (Oral)   Resp 16   Ht 5' 4\" (1.626 m)   Wt 157 lb 6.4 oz (71.4 kg)   SpO2 96%   BMI 27.02 kg/m² General: Well-developed and well-nourished, no distress. HEENT:  Head normocephalic/atraumatic, no scleral icterus  Neck: Supple. No JVD, lymphadenopathy, or thyromegaly. Lungs:  Clear to ausculation bilaterally. Good air movement. Heart:  Regular rate and rhythm, normal S1 and S2, no murmur, gallop, or rub  Extremities: No clubbing, cyanosis, or edema. Neurological: Alert and oriented. Psychiatric: Normal mood and affect. Behavior is normal.         ASSESSMENT AND PLAN    ICD-10-CM ICD-9-CM    1. Acquired hypothyroidism  E03.9 244.9 levothyroxine (SYNTHROID) 25 mcg tablet      TSH 3RD GENERATION      T4 (THYROXINE)   2. Hypertension, essential  I10 401.9 metoprolol succinate (TOPROL-XL) 25 mg XL tablet   3. Coronary artery disease involving native coronary artery of native heart with angina pectoris with documented spasm (HCC)  I25.111 414.01 metoprolol succinate (TOPROL-XL) 25 mg XL tablet     413.9    4. Memory loss  R41.3 780.93      Diagnoses and all orders for this visit:    1. Acquired hypothyroidism  Mild hypothyroidism. Has cold intolerance. -     Start levothyroxine (SYNTHROID) 25 mcg tablet; Take 1 Tab by mouth Daily (before breakfast). -     TSH 3RD GENERATION; Future  -     T4 (THYROXINE); Future    2. Hypertension, essential  Not controlled. Increase metoprolol XL 25 mg from 1/2 tab to 1 tab daily. Continue amlodipine 5 mg daily, losartan 100 mg daily, and clonidine 0.1 mg BID. If BP remains elevated with SBP >150 at next clinic visit, consider increasing clonidine dose to 0.2 mg BID. -     Start metoprolol succinate (TOPROL-XL) 25 mg XL tablet; Take 1 Tab by mouth daily. 3. Coronary artery disease involving native coronary artery of native heart with angina pectoris with documented spasm (HCC)  -     metoprolol succinate (TOPROL-XL) 25 mg XL tablet; Take 1 Tab by mouth daily.     4. Memory loss  Plan to do [facility] test at next clinic visit to test for mild cognitive impairment. Follow-up and Dispositions    · Return in about 10 weeks (around 5/24/2021), or if symptoms worsen or fail to improve, for MOCA test, thyroid; have non-fasting thyroid lab 1 week prior to appoitnment. I have discussed the diagnosis with the patient and the intended plan as seen in the above orders. Patient is in agreement. The patient has received an after-visit summary and questions were answered concerning future plans. I have discussed medication side effects and warnings with the patient as well.

## 2021-03-15 NOTE — PROGRESS NOTES
Kenneth Ni is a 80 y.o. female     Chief Complaint   Patient presents with    Medication Evaluation     Wants to discuss thyroid medication    Follow-up    Lethargy     1. Have you been to the ER, urgent care clinic since your last visit? Hospitalized since your last visit? No    2. Have you seen or consulted any other health care providers outside of the [address] [place] [address] since your last visit? Include any pap smears or colon screening.  No     Visit Vitals  Ht 5' 4\" (1.626 m)   Wt 157 lb 6.4 oz (71.4 kg)   BMI 27.02 kg/m²

## 2021-03-15 NOTE — PATIENT INSTRUCTIONS
Hypothyroidism: Care Instructions Your Care Instructions When you have hypothyroidism, your body doesn't make enough thyroid hormone. This hormone helps your body use energy. If your thyroid level is low, you may feel tired, be constipated, have an increase in your blood pressure, or have dry skin or memory problems. You may also get cold easily, even when it is warm. Women with low thyroid levels may have heavy menstrual periods. A blood test to find your thyroid-stimulating hormone (TSH) level is used to check for hypothyroidism. A high TSH level may mean that you have it. The treatment for hypothyroidism is thyroid hormone pills. You should start to feel better in 1 to 2 weeks. Most people need treatment for the rest of their lives. You will need regular visits with your doctor to make sure you are doing well and that you have the right dose of medicine. Follow-up care is a key part of your treatment and safety. Be sure to make and go to all appointments, and call your doctor if you are having problems. It's also a good idea to know your test results and keep a list of the medicines you take. How can you care for yourself at home? · Take your thyroid hormone medicine exactly as prescribed. Call your doctor if you think you are having a problem with your medicine. Most people do not have side effects if they take the right amount of medicine regularly. ? Take the medicine 30 minutes before breakfast, and do not take it with calcium, vitamins, or iron. ? Do not take extra doses of your thyroid medicine. It will not help you get better any faster, and it may cause side effects. ? If you forget to take a dose, do NOT take a double dose of medicine. Take your usual dose the next day. · Tell your doctor about all prescription, herbal, or over-the-counter products you take. · Take care of yourself. Eat a healthy diet, get enough sleep, and get regular exercise. When should you call for help?  
 Call 911 anytime you think you may need emergency care. For example, call if: 
  · You passed out (lost consciousness).  
  · You have severe trouble breathing.  
  · You have a very slow heartbeat (less than 60 beats a minute).  
  · You have a low body temperature (95°F or below). Call your doctor now or seek immediate medical care if: 
  · You feel tired, sluggish, or weak.  
  · You have trouble remembering things or concentrating.  
  · You do not begin to feel better 2 weeks after starting your medicine. Watch closely for changes in your health, and be sure to contact your doctor if you have any problems. Where can you learn more? Go to http://www.gray.com/ Enter A512 in the search box to learn more about \"Hypothyroidism: Care Instructions. \" Current as of: March 31, 2020               Content Version: 12.6 © 3241-4225 [employer]. Care instructions adapted under license by [employer] (which disclaims liability or warranty for this information). If you have questions about a medical condition or this instruction, always ask your healthcare professional. [address] any warranty or liability for your use of this information.

## 2021-04-20 LAB
SPECIMEN STATUS REPORT, ROLRST: NORMAL
T4 SERPL-MCNC: 7.4 UG/DL (ref 4.5–12)
TSH SERPL DL<=0.005 MIU/L-ACNC: 5.14 UIU/ML (ref 0.45–4.5)

## 2021-04-22 DIAGNOSIS — E03.9 ACQUIRED HYPOTHYROIDISM: Primary | ICD-10-CM

## 2021-04-22 NOTE — PROGRESS NOTES
Your thyroid test showed that your thyroid hormone level was still a little low. However, the test was done too early. It should have been done a week before your next appointment on 5/18/21. Stay on the same dose for now. Dr. Shaila Henson will mail a new lab order to have thyroid rechecked 7 days or less before your next clinic visit.

## 2021-05-12 LAB — TSH SERPL DL<=0.005 MIU/L-ACNC: 5 UIU/ML (ref 0.45–4.5)

## 2021-05-18 ENCOUNTER — OFFICE VISIT (OUTPATIENT)
Dept: INTERNAL MEDICINE CLINIC | Age: 86
End: 2021-05-18
Payer: MEDICARE

## 2021-05-18 VITALS
RESPIRATION RATE: 16 BRPM | HEIGHT: 64 IN | HEART RATE: 90 BPM | TEMPERATURE: 98.3 F | DIASTOLIC BLOOD PRESSURE: 100 MMHG | SYSTOLIC BLOOD PRESSURE: 178 MMHG | WEIGHT: 157 LBS | OXYGEN SATURATION: 96 % | BODY MASS INDEX: 26.8 KG/M2

## 2021-05-18 DIAGNOSIS — G31.84 MILD COGNITIVE IMPAIRMENT: Primary | ICD-10-CM

## 2021-05-18 DIAGNOSIS — E55.9 VITAMIN D DEFICIENCY: ICD-10-CM

## 2021-05-18 DIAGNOSIS — E78.00 HYPERCHOLESTEROLEMIA: ICD-10-CM

## 2021-05-18 DIAGNOSIS — I12.9 BENIGN HYPERTENSION WITH CHRONIC KIDNEY DISEASE, STAGE II: ICD-10-CM

## 2021-05-18 DIAGNOSIS — N18.2 BENIGN HYPERTENSION WITH CHRONIC KIDNEY DISEASE, STAGE II: ICD-10-CM

## 2021-05-18 DIAGNOSIS — E03.9 ACQUIRED HYPOTHYROIDISM: ICD-10-CM

## 2021-05-18 PROCEDURE — G8510 SCR DEP NEG, NO PLAN REQD: HCPCS | Performed by: INTERNAL MEDICINE

## 2021-05-18 PROCEDURE — 1101F PT FALLS ASSESS-DOCD LE1/YR: CPT | Performed by: INTERNAL MEDICINE

## 2021-05-18 PROCEDURE — 99214 OFFICE O/P EST MOD 30 MIN: CPT | Performed by: INTERNAL MEDICINE

## 2021-05-18 PROCEDURE — G8536 NO DOC ELDER MAL SCRN: HCPCS | Performed by: INTERNAL MEDICINE

## 2021-05-18 PROCEDURE — G8427 DOCREV CUR MEDS BY ELIG CLIN: HCPCS | Performed by: INTERNAL MEDICINE

## 2021-05-18 PROCEDURE — G8419 CALC BMI OUT NRM PARAM NOF/U: HCPCS | Performed by: INTERNAL MEDICINE

## 2021-05-18 PROCEDURE — 1090F PRES/ABSN URINE INCON ASSESS: CPT | Performed by: INTERNAL MEDICINE

## 2021-05-18 RX ORDER — DONEPEZIL HYDROCHLORIDE 5 MG/1
5 TABLET, FILM COATED ORAL
Qty: 30 TAB | Refills: 3 | Status: SHIPPED | OUTPATIENT
Start: 2021-05-18 | End: 2021-07-01 | Stop reason: ALTCHOICE

## 2021-05-18 NOTE — PROGRESS NOTES
Identified pt with two pt identifiers. Reviewed record in preparation for visit and have obtained necessary documentation. All patient medications has been reviewed. Chief Complaint   Patient presents with    Memory Loss    Thyroid Problem     Additional information about chief complaint:    Visit Vitals  BP (!) 178/100 (BP 1 Location: Left upper arm, BP Patient Position: Sitting, BP Cuff Size: Large adult)   Pulse 90   Temp 98.3 °F (36.8 °C) (Oral)   Resp 16   Ht 5' 4\" (1.626 m)   Wt 157 lb (71.2 kg)   SpO2 96%   BMI 26.95 kg/m²       Health Maintenance Due   Topic    Shingrix Vaccine Age 50> (1 of 2)       1. Have you been to the ER, urgent care clinic since your last visit? Hospitalized since your last visit? NO   2. Have you seen or consulted any other health care providers outside of the [address] [place] [address] since your last visit? Include any pap smears or colon screening.  NO     bdg

## 2021-05-18 NOTE — PROGRESS NOTES
CC:   Chief Complaint   Patient presents with    Memory Loss    Thyroid Problem       HISTORY OF PRESENT ILLNESS  Trenton Swift is a 80 y.o. female. Presents for evaluation of memory loss and hypothyroidism. Complains of memory loss. Forgets names and why she went into a room. Does not forget to lock doors or turn off stove. Keeps a daily journal where she writes this down to help her memory. Asked about memory supplements: Evelyne Puentes 45. Thyroid ROS: no longer has cold intolerance, denies fatigue, weight changes, heat, bowel/skin changes or CVS symptoms. Taking medication as prescribed for about 5-6 weeks. Last TSH was high (5/11/20: 5.000, 11/20/20: 7.950)    Denies HA's, CP, SOB, dizziness, heart palpitations, or leg swelling. Home BP: -150's. 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 9) E66.3    Vitamin D deficiency E55.9    Acquired hypothyroidism E03.9     Past Medical History:   Diagnosis Date    CAD (coronary artery disease)     GERD (gastroesophageal reflux disease)     Hypercholesterolemia     Hypertension     Plantar fasciitis 3/1/2010    Rotator cuff rupture 3/12/2013    1/13  MRI shows complete tear of supraspinatus with retraction, other are OK     SOB (shortness of breath) 6/14/2018    Tubular adenoma of colon 8/4/2014 July 2014     Tubular adenoma of rectum 5/19/2019 5/19/19    UTI (urinary tract infection) 7/5/2018     Allergies   Allergen Reactions    Contrast Agent [Iodine] Hives    Penicillins Hives    Bystolic [Nebivolol] Other (comments)     abd pain    Cardizem [Diltiazem Hcl] Rash    Cardura [Doxazosin] Unknown (comments)    Codeine Nausea Only    Cymbalta [Duloxetine] Other (comments)     Abdominal pain, anxiety    Gabapentin Other (comments)     Made her feel crazy    Prinivil [Lisinopril] Unknown (comments)    Tekturna [Aliskiren] Other (comments)     abd cramps    Tenormin [Atenolol] Unknown (comments)       Current Outpatient Medications   Medication Sig Dispense Refill    atorvastatin (LIPITOR) 40 mg tablet Take 1 tablet by mouth once daily 90 Tab 3    OTHER CBD oil taking 2 drops twice a day for cervical stenosis      levothyroxine (SYNTHROID) 25 mcg tablet Take 1 Tab by mouth Daily (before breakfast). 90 Tab 0    metoprolol succinate (TOPROL-XL) 25 mg XL tablet Take 1 Tab by mouth daily. 45 Tab 3    amLODIPine (NORVASC) 5 mg tablet Take 1 tablet by mouth once daily 90 Tab 3    cloNIDine HCL (CATAPRES) 0.1 mg tablet Take 1 tablet by mouth twice daily (Patient taking differently: Take 0.1 mg by mouth two (2) times a day.) 180 Tab 3    losartan (COZAAR) 100 mg tablet Take 1 tablet by mouth once daily 90 Tab 3    furosemide (LASIX) 20 mg tablet Take 1 tablet by mouth once daily 90 Tab 3    aspirin 81 mg chewable tablet Take 1 Tab by mouth daily. 30 Tab 0    acetaminophen (TYLENOL ARTHRITIS PAIN) 650 mg TbER Take 650 mg by mouth as needed.  polyethylene glycol (MIRALAX) 17 gram/dose powder Take 17 g by mouth as needed. PHYSICAL EXAM  Visit Vitals  BP (!) 178/100 (BP 1 Location: Left upper arm, BP Patient Position: Sitting, BP Cuff Size: Large adult)   Pulse 90   Temp 98.3 °F (36.8 °C) (Oral)   Resp 16   Ht 5' 4\" (1.626 m)   Wt 157 lb (71.2 kg)   SpO2 96%   BMI 26.95 kg/m²       General: Well-developed and well-nourished, no distress. HEENT:  Head normocephalic/atraumatic, no scleral icterus  Lungs:  Clear to ausculation bilaterally. Good air movement.   Heart:  Regular rate and rhythm, normal S1 and S2, no murmur, gallop, or rub  Extremities: No clubbing, cyanosis, or edema. Neurological: Alert and oriented. Psychiatric: Normal mood and affect. Behavior is normal.     Results for orders placed or performed in visit on 04/22/21   TSH 3RD GENERATION   Result Value Ref Range    TSH 5.000 (H) 0.450 - 4.500 uIU/mL                   MOCA Test version 8.3 score: 20/30 (points missed for visuospatial/executive function and delayed recall. Also unable to recall month and date.)        ASSESSMENT AND PLAN    ICD-10-CM ICD-9-CM    1. Mild cognitive impairment  G31.84 331.83 donepeziL (ARICEPT) 5 mg tablet   2. Acquired hypothyroidism  E03.9 244.9 TSH 3RD GENERATION      T4, FREE   3. Hypercholesterolemia  E78.00 272.0 LIPID PANEL   4. Benign hypertension with chronic kidney disease, stage II  G66.4 657.92 METABOLIC PANEL, COMPREHENSIVE    N18.2 585.2 CBC WITH AUTOMATED DIFF   5. Vitamin D deficiency  E55.9 268.9 VITAMIN D, 25 HYDROXY     Diagnoses and all orders for this visit:    1. Mild cognitive impairment  MOCA test score 20/30 consistent with [facility]. -     Start donepeziL (ARICEPT) 5 mg tablet; Take 1 Tab by mouth nightly. 2. Acquired hypothyroidism  Feeling better though TSH still a little high at 5.0. Will continue levothyroxine 25 mcg daily. Recheck TSH before next clinic visit. -     TSH 3RD GENERATION; Future  -     T4, FREE; Future    3. Hypercholesterolemia  Controlled on atorvastatin 40 mg daily.  -     LIPID PANEL; Future    4. Benign hypertension with chronic kidney disease, stage II  Elevated at 178/100. Has better readings at home. May have white coat HTN. Asked her to check her home BP 3 times a week and bring record to next clinic visit. Also bring her home BP monitor with her to next clinic visit. -     METABOLIC PANEL, COMPREHENSIVE; Future  -     CBC WITH AUTOMATED DIFF; Future    5.  Vitamin D deficiency  -     VITAMIN D, 25 HYDROXY; Future      Follow-up and Dispositions    · Return in about 3 months (around 8/18/2021), or if symptoms worsen or fail to improve, for Medicare AWV; have fasting labs 1 week before appointment (call clinic in July to schedule). I have discussed the diagnosis with the patient and the intended plan as seen in the above orders. Patient is in agreement. The patient has received an after-visit summary and questions were answered concerning future plans. I have discussed medication side effects and warnings with the patient as well.

## 2021-05-18 NOTE — PATIENT INSTRUCTIONS
Learning About Vitamin D  Why is it important to get enough vitamin D? Your body needs vitamin D to absorb calcium. Calcium keeps your bones and muscles, including your heart, healthy and strong. If your muscles don't get enough calcium, they can cramp, hurt, or feel weak. You may have long-term (chronic) muscle aches and pains. If you don't get enough vitamin D throughout life, you have an increased chance of having thin and brittle bones (osteoporosis) in your later years. Children who don't get enough vitamin D may not grow as much as others their age. They also have a chance of getting a rare disease called rickets. It causes weak bones. Vitamin D and calcium are added to many foods. And your body uses sunshine to make its own vitamin D. How much vitamin D do you need? The recommended daily allowance (RDA) for vitamin D is 600 IU (international units) every day for people ages 3 through 79. Adults 71 and older need 800 IU every day. Blood tests for vitamin D can check your vitamin D level. But there is no standard normal range used by all laboratories. You're likely getting enough vitamin D if your levels are in the range of 20 to 50 ng/mL. How can you get more vitamin D? Foods that contain vitamin D include:  · [place], tuna, and mackerel. These are some of the best foods to eat when you need to get more vitamin D.  · Cheese, egg yolks, and beef liver. These foods have vitamin D in small amounts. · Milk, soy drinks, orange juice, yogurt, margarine, and some kinds of cereal have vitamin D added to them. Some people don't make vitamin D as well as others. They may have to take extra care in getting enough vitamin D. Things that reduce how much vitamin D your body makes include:  · Dark skin, such as many  Americans have. · Age, especially if you are older than 72. · Digestive problems, such as Crohn's or celiac disease. · Liver and kidney disease.   Some people who do not get enough vitamin D may need supplements. Are there any risks from taking vitamin D?  · Too much vitamin D:  ? Can damage your kidneys. ? Can cause nausea and vomiting, constipation, and weakness. ? Raises the amount of calcium in your blood. If this happens, you can get confused or have an irregular heart rhythm. · Vitamin D may interact with other medicines. Tell your doctor about all of the medicines you take, including over-the-counter drugs, herbs, and pills. Tell your doctor about all of your current medical problems. Where can you learn more? Go to http://www.fernandes.com/  Enter V530 in the search box to learn more about \"Learning About Vitamin D.\"  Current as of: December 17, 2020               Content Version: 12.8  © 2006-2021 Healthwise, [employer]. Care instructions adapted under license by [employer] (which disclaims liability or warranty for this information). If you have questions about a medical condition or this instruction, always ask your healthcare professional. [place] [address] any warranty or liability for your use of this information.

## 2021-05-21 PROBLEM — G31.84 MILD COGNITIVE IMPAIRMENT: Status: ACTIVE | Noted: 2021-05-21

## 2021-06-09 ENCOUNTER — TELEPHONE (OUTPATIENT)
Dept: INTERNAL MEDICINE CLINIC | Age: 86
End: 2021-06-09

## 2021-06-09 NOTE — LETTER
6/10/2021 4:42 PM 
 
Ms. Kel Watt 
[address] [id number] To Whom It May Concern: This letter is concerning jury duty for Flo Myles, daughter of Kel Watt, : 1934, followed at  [address] [place] [address]. Ms. Keya Gauthier has a doctor's appointment on 6/15/21. She has cognitive impairment and needs Ms. Roberta Goncalves to accompany her to the doctor's visit. For this reason, Ms. Flo Myles must be excused from jury duty at this time. Please contact the office if you need further information or have any questions. Sincerely, Lory Carbone MD

## 2021-06-09 NOTE — TELEPHONE ENCOUNTER
Pt daughter called and stated that she needs a letter stating that the pt is diagnosised with dementia and that the daughter is needed to be at the appointment. The daughter has jury duty on the dated of her appt 6/15.  Everton Alvarado would like to speak to the nurse about this first.

## 2021-06-09 NOTE — TELEPHONE ENCOUNTER
Westley Talamantes has jury duty, courts will defer her until next round of people but she needs a letter. Letter needs to state pt's diagnosis, pt can not drive and Claudia Kramer is her care giver and pt has an appointment on 6/15/2021. Claudia Kramer will pickup when ready.

## 2021-06-11 ENCOUNTER — TELEPHONE (OUTPATIENT)
Dept: INTERNAL MEDICINE CLINIC | Age: 86
End: 2021-06-11

## 2021-06-15 ENCOUNTER — OFFICE VISIT (OUTPATIENT)
Dept: INTERNAL MEDICINE CLINIC | Age: 86
End: 2021-06-15
Payer: COMMERCIAL

## 2021-06-15 VITALS
TEMPERATURE: 98.3 F | OXYGEN SATURATION: 98 % | HEIGHT: 64 IN | DIASTOLIC BLOOD PRESSURE: 85 MMHG | HEART RATE: 63 BPM | BODY MASS INDEX: 26.46 KG/M2 | WEIGHT: 155 LBS | RESPIRATION RATE: 14 BRPM | SYSTOLIC BLOOD PRESSURE: 137 MMHG

## 2021-06-15 DIAGNOSIS — E03.9 ACQUIRED HYPOTHYROIDISM: ICD-10-CM

## 2021-06-15 DIAGNOSIS — F41.1 GENERALIZED ANXIETY DISORDER: ICD-10-CM

## 2021-06-15 DIAGNOSIS — G31.84 MILD COGNITIVE IMPAIRMENT: Primary | ICD-10-CM

## 2021-06-15 PROCEDURE — 99213 OFFICE O/P EST LOW 20 MIN: CPT | Performed by: INTERNAL MEDICINE

## 2021-06-15 RX ORDER — VENLAFAXINE HYDROCHLORIDE 37.5 MG/1
37.5 CAPSULE, EXTENDED RELEASE ORAL DAILY
Qty: 30 CAPSULE | Refills: 2 | Status: SHIPPED | OUTPATIENT
Start: 2021-06-15 | End: 2022-05-27

## 2021-06-15 RX ORDER — LEVOTHYROXINE SODIUM 50 UG/1
50 TABLET ORAL
Qty: 90 TABLET | Refills: 0 | Status: SHIPPED | OUTPATIENT
Start: 2021-06-15 | End: 2021-08-18 | Stop reason: ALTCHOICE

## 2021-06-15 NOTE — PROGRESS NOTES
Identified pt with two pt identifiers. Reviewed record in preparation for visit and have obtained necessary documentation. All patient medications has been reviewed. Chief Complaint   Patient presents with    Cholesterol Problem    Hypertension     Additional information about chief complaint:    Visit Vitals  BP (!) 180/107 (BP 1 Location: Left upper arm, BP Patient Position: Sitting, BP Cuff Size: Large adult)   Pulse 63   Temp 98.3 °F (36.8 °C) (Oral)   Resp 14   Ht 5' 4\" (1.626 m)   Wt 155 lb (70.3 kg)   SpO2 98%   BMI 26.61 kg/m²       Health Maintenance Due   Topic    Shingrix Vaccine Age 50> (1 of 2)       1. Have you been to the ER, urgent care clinic since your last visit? Hospitalized since your last visit? NO   2. Have you seen or consulted any other health care providers outside of the [address] [place] [address] since your last visit? Include any pap smears or colon screening.   NO   bdg

## 2021-06-15 NOTE — PATIENT INSTRUCTIONS
Generalized Anxiety Disorder: Care Instructions Overview We all worry. It's a normal part of life. But when you have generalized anxiety disorder, you worry about lots of things. You have a hard time not worrying. This worry or anxiety interferes with your relationships, work or school, and other areas of your life. You may worry most days about things like money, health, work, or friends. That may make you feel tired, tense, or cranky. It can make it hard to think. It may get in the way of healthy sleep. Counseling and medicine can both work to treat anxiety. They are often used together with lifestyle changes, such as getting enough sleep. Treatment can include a type of counseling called cognitive-behavioral therapy, or CBT. It helps you notice and replace thoughts that make you worry. You also might have counseling along with those closest to you so that they can help. Follow-up care is a key part of your treatment and safety. Be sure to make and go to all appointments, and call your doctor if you are having problems. It's also a good idea to know your test results and keep a list of the medicines you take. How can you care for yourself at home? · Get at least 30 minutes of exercise on most days of the week. Walking is a good choice. You also may want to do other activities, such as running, swimming, cycling, or playing tennis or team sports. · Learn and do relaxation exercises, such as deep breathing. · Go to bed at the same time every night. Try for 8 to 10 hours of sleep a night. · Avoid alcohol, marijuana, and illegal drugs. · Find a counselor who uses cognitive-behavioral therapy (CBT). · Don't isolate yourself. Let those closest to you help you. Find someone you can trust and confide in. Talk to that person. · Be safe with medicines. Take your medicines exactly as prescribed. Call your doctor if you think you are having a problem with your medicine. · Practice healthy thinking.  How you think can affect how you feel and act. Ask yourself if your thoughts are helpful or unhelpful. If they are unhelpful, you can learn how to change them. · Recognize and accept your anxiety. When you feel anxious, say to yourself, \"This is not an emergency. I feel uncomfortable, but I am not in danger. I can keep going even if I feel anxious. \" When should you call for help? Call  911 anytime you think you may need emergency care. For example, call if: 
  · You feel you can't stop from hurting yourself or someone else. Keep the numbers for these national suicide hotlines: 7-495-934-TALK (2-904.914.6533) and 7-879-UKPQTSA (0-235.468.6003). If you or someone you know talks about suicide or feeling hopeless, get help right away. Call your doctor or counselor now or seek immediate medical care if: 
  · You have new anxiety, or your anxiety gets worse.  
  · You have been feeling sad, depressed, or hopeless or have lost interest in things that you usually enjoy.  
  · You do not get better as expected. Where can you learn more? Go to http://www.gray.com/ Enter G123 in the search box to learn more about \"Generalized Anxiety Disorder: Care Instructions. \" Current as of: November 3, 2020               Content Version: 12.8 © 4368-2334 Healthwise, Incorporated. Care instructions adapted under license by [employer] (which disclaims liability or warranty for this information). If you have questions about a medical condition or this instruction, always ask your healthcare professional. [address] any warranty or liability for your use of this information.
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
## 2021-07-01 ENCOUNTER — TELEPHONE (OUTPATIENT)
Dept: INTERNAL MEDICINE CLINIC | Age: 86
End: 2021-07-01

## 2021-07-01 NOTE — TELEPHONE ENCOUNTER
She can stop taking donepezil. We can discuss starting a different memory medication (Namenda) when I see her in clinic next month.

## 2021-07-01 NOTE — TELEPHONE ENCOUNTER
Pt called and wants to know if she can stop taking her \"memory medicine\" because it is causing her leg cramps. Please return call at [phone].

## 2021-07-01 NOTE — TELEPHONE ENCOUNTER
Verified patients name and date of birth. Patient states she started having leg cramps 2 or 3 weeks after starting donepezil. She states the leg cramps are always at night when she is lying down. Denies being dehydrated. Please advise.

## 2021-07-02 NOTE — TELEPHONE ENCOUNTER
Verified patients name and date of birth. Advised patient per PCP. Patient stated understanding. Patient stated she did not take donepezil last night and did not have leg cramps.

## 2021-08-07 LAB
25(OH)D3+25(OH)D2 SERPL-MCNC: 24.7 NG/ML (ref 30–100)
ALBUMIN SERPL-MCNC: 4.2 G/DL (ref 3.6–4.6)
ALBUMIN/GLOB SERPL: 1.9 {RATIO} (ref 1.2–2.2)
ALP SERPL-CCNC: 106 IU/L (ref 48–121)
ALT SERPL-CCNC: 11 IU/L (ref 0–32)
AST SERPL-CCNC: 19 IU/L (ref 0–40)
BASOPHILS # BLD AUTO: 0.1 X10E3/UL (ref 0–0.2)
BASOPHILS NFR BLD AUTO: 2 %
BILIRUB SERPL-MCNC: 0.6 MG/DL (ref 0–1.2)
BUN SERPL-MCNC: 18 MG/DL (ref 8–27)
BUN/CREAT SERPL: 20 (ref 12–28)
CALCIUM SERPL-MCNC: 9.7 MG/DL (ref 8.7–10.3)
CHLORIDE SERPL-SCNC: 102 MMOL/L (ref 96–106)
CHOLEST SERPL-MCNC: 136 MG/DL (ref 100–199)
CO2 SERPL-SCNC: 26 MMOL/L (ref 20–29)
CREAT SERPL-MCNC: 0.91 MG/DL (ref 0.57–1)
EOSINOPHIL # BLD AUTO: 0.2 X10E3/UL (ref 0–0.4)
EOSINOPHIL NFR BLD AUTO: 5 %
ERYTHROCYTE [DISTWIDTH] IN BLOOD BY AUTOMATED COUNT: 13 % (ref 11.7–15.4)
GLOBULIN SER CALC-MCNC: 2.2 G/DL (ref 1.5–4.5)
GLUCOSE SERPL-MCNC: 93 MG/DL (ref 65–99)
HCT VFR BLD AUTO: 39.8 % (ref 34–46.6)
HDLC SERPL-MCNC: 37 MG/DL
HGB BLD-MCNC: 13.3 G/DL (ref 11.1–15.9)
IMM GRANULOCYTES # BLD AUTO: 0 X10E3/UL (ref 0–0.1)
IMM GRANULOCYTES NFR BLD AUTO: 0 %
LDLC SERPL CALC-MCNC: 84 MG/DL (ref 0–99)
LYMPHOCYTES # BLD AUTO: 0.9 X10E3/UL (ref 0.7–3.1)
LYMPHOCYTES NFR BLD AUTO: 28 %
MCH RBC QN AUTO: 29.8 PG (ref 26.6–33)
MCHC RBC AUTO-ENTMCNC: 33.4 G/DL (ref 31.5–35.7)
MCV RBC AUTO: 89 FL (ref 79–97)
MONOCYTES # BLD AUTO: 0.4 X10E3/UL (ref 0.1–0.9)
MONOCYTES NFR BLD AUTO: 11 %
NEUTROPHILS # BLD AUTO: 1.8 X10E3/UL (ref 1.4–7)
NEUTROPHILS NFR BLD AUTO: 54 %
PLATELET # BLD AUTO: 220 X10E3/UL (ref 150–450)
POTASSIUM SERPL-SCNC: 3.9 MMOL/L (ref 3.5–5.2)
PROT SERPL-MCNC: 6.4 G/DL (ref 6–8.5)
RBC # BLD AUTO: 4.47 X10E6/UL (ref 3.77–5.28)
SODIUM SERPL-SCNC: 140 MMOL/L (ref 134–144)
T4 FREE SERPL-MCNC: 1.11 NG/DL (ref 0.82–1.77)
TRIGL SERPL-MCNC: 72 MG/DL (ref 0–149)
TSH SERPL DL<=0.005 MIU/L-ACNC: 7.53 UIU/ML (ref 0.45–4.5)
VLDLC SERPL CALC-MCNC: 15 MG/DL (ref 5–40)
WBC # BLD AUTO: 3.3 X10E3/UL (ref 3.4–10.8)

## 2021-08-08 NOTE — PROGRESS NOTES
Will discuss results at 8/18/21 appt. Normal kidney and liver tests, blood counts, and cholesterol. Stay on atorvastatin. Mildly low WBC count; nothing to worry about. Thyroid level still too low. Will make sure she is taking it correctly. If so, plan to increase levothyroxine from 50 to 75 mcg daily. Vitamin D level a little low, unchanged from 8 months ago. Start cholecalciferol 2000 units daily.

## 2021-08-18 ENCOUNTER — OFFICE VISIT (OUTPATIENT)
Dept: INTERNAL MEDICINE CLINIC | Age: 86
End: 2021-08-18
Payer: COMMERCIAL

## 2021-08-18 VITALS
OXYGEN SATURATION: 97 % | SYSTOLIC BLOOD PRESSURE: 171 MMHG | DIASTOLIC BLOOD PRESSURE: 105 MMHG | BODY MASS INDEX: 25.44 KG/M2 | TEMPERATURE: 98.1 F | WEIGHT: 149 LBS | RESPIRATION RATE: 16 BRPM | HEART RATE: 85 BPM | HEIGHT: 64 IN

## 2021-08-18 DIAGNOSIS — E03.9 ACQUIRED HYPOTHYROIDISM: ICD-10-CM

## 2021-08-18 DIAGNOSIS — Z00.00 MEDICARE ANNUAL WELLNESS VISIT, SUBSEQUENT: Primary | ICD-10-CM

## 2021-08-18 DIAGNOSIS — E78.00 HYPERCHOLESTEROLEMIA: ICD-10-CM

## 2021-08-18 DIAGNOSIS — Z13.31 SCREENING FOR DEPRESSION: ICD-10-CM

## 2021-08-18 DIAGNOSIS — F41.1 GENERALIZED ANXIETY DISORDER: ICD-10-CM

## 2021-08-18 DIAGNOSIS — G31.84 MILD COGNITIVE IMPAIRMENT: ICD-10-CM

## 2021-08-18 DIAGNOSIS — I10 ESSENTIAL HYPERTENSION: ICD-10-CM

## 2021-08-18 DIAGNOSIS — Z78.9 ADVANCE DIRECTIVE ON FILE: ICD-10-CM

## 2021-08-18 DIAGNOSIS — E55.9 VITAMIN D DEFICIENCY: ICD-10-CM

## 2021-08-18 PROCEDURE — G0439 PPPS, SUBSEQ VISIT: HCPCS | Performed by: INTERNAL MEDICINE

## 2021-08-18 PROCEDURE — 99214 OFFICE O/P EST MOD 30 MIN: CPT | Performed by: INTERNAL MEDICINE

## 2021-08-18 RX ORDER — LEVOTHYROXINE SODIUM 75 UG/1
75 TABLET ORAL
Qty: 90 TABLET | Refills: 1 | Status: SHIPPED | OUTPATIENT
Start: 2021-08-18 | End: 2021-11-23 | Stop reason: DRUGHIGH

## 2021-08-18 RX ORDER — MELATONIN
2000 DAILY
Qty: 180 TABLET | Refills: 1
Start: 2021-08-18

## 2021-08-18 NOTE — PATIENT INSTRUCTIONS
Medicare Wellness Visit, Female     The best way to live healthy is to have a lifestyle where you eat a well-balanced diet, exercise regularly, limit alcohol use, and quit all forms of tobacco/nicotine, if applicable. Regular preventive services are another way to keep healthy. Preventive services (vaccines, screening tests, monitoring & exams) can help personalize your care plan, which helps you manage your own care. Screening tests can find health problems at the earliest stages, when they are easiest to treat. [facility] follows the current, evidence-based guidelines published by the [facility] Guard[facility] ([facility]) when recommending preventive services for our patients. Because we follow these guidelines, sometimes recommendations change over time as research supports it. (For example, mammograms used to be recommended annually. Even though Medicare will still pay for an annual mammogram, the newer guidelines recommend a mammogram every two years for women of average risk). Of course, you and your doctor may decide to screen more often for some diseases, based on your risk and your co-morbidities (chronic disease you are already diagnosed with). Preventive services for you include:  - Medicare offers their members a free annual wellness visit, which is time for you and your primary care provider to discuss and plan for your preventive service needs. Take advantage of this benefit every year!  -All adults over the age of 72 should receive the recommended pneumonia vaccines. Current USPSTF guidelines recommend a series of two vaccines for the best pneumonia protection.   -All adults should have a flu vaccine yearly and a tetanus vaccine every 10 years.   -All adults age 48 and older should receive the shingles vaccines (series of two vaccines).       -All adults age 38-68 who are overweight should have a diabetes screening test once every three years.   -All adults born between [birth year] and 1965 should be screened once for Hepatitis C.  -Other screening tests and preventive services for persons with diabetes include: an eye exam to screen for diabetic retinopathy, a kidney function test, a foot exam, and stricter control over your cholesterol.   -Cardiovascular screening for adults with routine risk involves an electrocardiogram (ECG) at intervals determined by your doctor.   -Colorectal cancer screenings should be done for adults age 54-65 with no increased risk factors for colorectal cancer. There are a number of acceptable methods of screening for this type of cancer. Each test has its own benefits and drawbacks. Discuss with your doctor what is most appropriate for you during your annual wellness visit. The different tests include: colonoscopy (considered the best screening method), a fecal occult blood test, a fecal DNA test, and sigmoidoscopy.    -A bone mass density test is recommended when a woman turns 65 to screen for osteoporosis. This test is only recommended one time, as a screening. Some providers will use this same test as a disease monitoring tool if you already have osteoporosis. -Breast cancer screenings are recommended every other year for women of normal risk, age 54-69.  -Cervical cancer screenings for women over age 72 are only recommended with certain risk factors. Here is a list of your current Health Maintenance items (your personalized list of preventive services) with a due date:  Health Maintenance Due   Topic Date Due    Shingles Vaccine (1 of 2) Never done              Learning About Vitamin D  Why is it important to get enough vitamin D? Your body needs vitamin D to absorb calcium. Calcium keeps your bones and muscles, including your heart, healthy and strong. If your muscles don't get enough calcium, they can cramp, hurt, or feel weak. You may have long-term (chronic) muscle aches and pains.   If you don't get enough vitamin D throughout life, you have an increased chance of having thin and brittle bones (osteoporosis) in your later years. Children who don't get enough vitamin D may not grow as much as others their age. They also have a chance of getting a rare disease called rickets. It causes weak bones. Vitamin D and calcium are added to many foods. And your body uses sunshine to make its own vitamin D. How much vitamin D do you need? The recommended daily allowance (RDA) for vitamin D is 600 IU (international units) every day for people ages 3 through 79. Adults 71 and older need 800 IU every day. Blood tests for vitamin D can check your vitamin D level. But there is no standard normal range used by all laboratories. You're likely getting enough vitamin D if your levels are in the range of 20 to 50 ng/mL. How can you get more vitamin D? Foods that contain vitamin D include:  · [place], tuna, and mackerel. These are some of the best foods to eat when you need to get more vitamin D.  · Cheese, egg yolks, and beef liver. These foods have vitamin D in small amounts. · Milk, soy drinks, orange juice, yogurt, margarine, and some kinds of cereal have vitamin D added to them. Some people don't make vitamin D as well as others. They may have to take extra care in getting enough vitamin D. Things that reduce how much vitamin D your body makes include:  · Dark skin, such as many  Americans have. · Age, especially if you are older than 72. · Digestive problems, such as Crohn's or celiac disease. · Liver and kidney disease. Some people who do not get enough vitamin D may need supplements. Are there any risks from taking vitamin D?  · Too much vitamin D:  ? Can damage your kidneys. ? Can cause nausea and vomiting, constipation, and weakness. ? Raises the amount of calcium in your blood. If this happens, you can get confused or have an irregular heart rhythm. · Vitamin D may interact with other medicines.  Tell your doctor about all of the medicines you take, including over-the-counter drugs, herbs, and pills. Tell your doctor about all of your current medical problems. Where can you learn more? Go to http://www.FST21.com/  Enter G479 in the search box to learn more about \"Learning About Vitamin D.\"  Current as of: December 17, 2020               Content Version: 12.8  © 2006-2021 [employer]. Care instructions adapted under license by [employer] (which disclaims liability or warranty for this information). If you have questions about a medical condition or this instruction, always ask your healthcare professional. [place] [address] any warranty or liability for your use of this information.

## 2021-08-18 NOTE — PROGRESS NOTES
Advance Care Planning     Advance Care Planning (ACP) Physician/NP/PA Conversation      Date of Conversation: 8/18/2021  Conducted with: Patient with Decision Making Capacity    Healthcare Decision Maker:     Primary Decision Maker: Rupinder Boles - Child - [phone]    Primary Decision Maker: Horacio Mathews Child - [phone]  Click here to complete [address] including selection of the Healthcare Decision Maker Relationship (ie \"Primary\")      Care Preferences:    Hospitalization: \"If your health worsens and it becomes clear that your chance of recovery is unlikely, what would be your preference regarding hospitalization? \"  The patient would prefer hospitalization. Ventilation: \"If you were unable to breathe on your own and your chance of recovery was unlikely, what would be your preference about the use of a ventilator (breathing machine) if it was available to you? \"   The patient would NOT desire the use of a ventilator. Resuscitation: \"In the event your heart stopped as a result of an underlying serious health condition, would you want attempts to be made to restart your heart, or would you prefer a natural death? \"   Yes, attempt to resuscitate.     Additional topics discussed: treatment goals    Conversation Outcomes / Follow-Up Plan:   ACP complete - no further action today  Reviewed DNR/DNI and patient elects Full Code (Attempt Resuscitation)     Length of Voluntary ACP Conversation in minutes:  <16 minutes (Non-Billable)    Emmanuelle Gilbert MD

## 2021-08-18 NOTE — PROGRESS NOTES
CC:   Chief Complaint   Patient presents with   [employer] Annual Wellness Visit       HISTORY OF PRESENT ILLNESS  Rony Gonzalez is a 80 y.o. female. Accompanied by her son. Presents for Medicare AWV and 2 month follow up. She has HTN with CKD stage 2, hypercholesterolemia, CAD s/p 2 stents in 7/18, mild cognitive impairment, hypothyroidism, GERD, chronic low back pain due to spinal stenosis s/p lumbar laminectomy in 1996, and osteopenia. Reports improved cold intolerance since starting levothyroxine. Denies fatigue, weight changes, heat, bowel/skin changes or CVS symptoms. Taking medication properly as prescribed . Last TSH was high (7.530 on 8/6/21)    Denies HA's, CP, SOB, dizziness, heart palpitations, or leg swelling. Home BP monitoring: -130's. She reports taking medications as instructed, no medication side effects noted. Diet and Lifestyle: generally follows a low fat low cholesterol diet, generally follows a low sodium diet, no formal exercise but active during the day. Lab review: labs reviewed and discussed with patient. Tolerating Aricept with no side effects. Feels anxiety is better since starting Effexor XR 25 mg daily. Her son agrees. Driving to [employer] and short distances with no problem. No car accident since last clinic visit. ROS  A complete review of systems was performed and is negative except for those mentioned in the HPI. 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 9) E66.3    Vitamin D deficiency E55.9    Acquired hypothyroidism E03.9    Mild cognitive impairment G31.84     Past Medical History:   Diagnosis Date    CAD (coronary artery disease)     GERD (gastroesophageal reflux disease)     Hypercholesterolemia     Hypertension     Plantar fasciitis 3/1/2010    Rotator cuff rupture 3/12/2013    1/13  MRI shows complete tear of supraspinatus with retraction, other are OK     SOB (shortness of breath) 6/14/2018    Tubular adenoma of colon 8/4/2014 July 2014     Tubular adenoma of rectum 5/19/2019 5/19/19    UTI (urinary tract infection) 7/5/2018     Allergies   Allergen Reactions    Contrast Agent [Iodine] Hives    Penicillins Hives    Bystolic [Nebivolol] Other (comments)     abd pain    Cardizem [Diltiazem Hcl] Rash    Cardura [Doxazosin] Unknown (comments)    Codeine Nausea Only    Cymbalta [Duloxetine] Other (comments)     Abdominal pain, anxiety    Donepezil Other (comments)     Muscle cramps in legs    Gabapentin Other (comments)     Made her feel crazy    Prinivil [Lisinopril] Unknown (comments)    Tekturna [Aliskiren] Other (comments)     abd cramps    Tenormin [Atenolol] Unknown (comments)       Current Outpatient Medications   Medication Sig Dispense Refill    levothyroxine (SYNTHROID) 50 mcg tablet Take 1 Tablet by mouth Daily (before breakfast). 90 Tablet 0    venlafaxine-SR (EFFEXOR-XR) 37.5 mg capsule Take 1 Capsule by mouth daily. 30 Capsule 2    atorvastatin (LIPITOR) 40 mg tablet Take 1 tablet by mouth once daily 90 Tab 3    OTHER CBD oil taking 2 drops twice a day for cervical stenosis      metoprolol succinate (TOPROL-XL) 25 mg XL tablet Take 1 Tab by mouth daily.  45 Tab 3    amLODIPine (NORVASC) 5 mg tablet Take 1 tablet by mouth once daily 90 Tab 3    cloNIDine HCL (CATAPRES) 0.1 mg tablet Take 1 tablet by mouth twice daily (Patient taking differently: Take 0.1 mg by mouth two (2) times a day.) 180 Tab 3    losartan (COZAAR) 100 mg tablet Take 1 tablet by mouth once daily 90 Tab 3    furosemide (LASIX) 20 mg tablet Take 1 tablet by mouth once daily 90 Tab 3    aspirin 81 mg chewable tablet Take 1 Tab by mouth daily. 30 Tab 0    acetaminophen (TYLENOL ARTHRITIS PAIN) 650 mg TbER Take 650 mg by mouth as needed.  polyethylene glycol (MIRALAX) 17 gram/dose powder Take 17 g by mouth as needed. PHYSICAL EXAM  Visit Vitals  BP (!) 171/105 (BP 1 Location: Left upper arm, BP Patient Position: Sitting, BP Cuff Size: Large adult)   Pulse 85   Temp 98.1 °F (36.7 °C) (Oral)   Resp 16   Ht 5' 4\" (1.626 m)   Wt 149 lb (67.6 kg)   SpO2 97%   BMI 25.58 kg/m²       General: Well-developed and well-nourished, no distress. HEENT:  Head normocephalic/atraumatic, no scleral icterus  Lungs:  Clear to ausculation bilaterally. Good air movement. Heart:  Regular rate and rhythm, normal S1 and S2, no murmur, gallop, or rub  Extremities: No clubbing, cyanosis, or edema. Neurological: Alert and oriented. Psychiatric: Normal mood and affect. Behavior is normal.     Results for orders placed or performed in visit on 08/06/21   CBC WITH AUTOMATED DIFF   Result Value Ref Range    WBC 3.3 (L) 3.4 - 10.8 x10E3/uL    RBC 4.47 3.77 - 5.28 x10E6/uL    HGB 13.3 11.1 - 15.9 g/dL    HCT 39.8 34.0 - 46.6 %    MCV 89 79 - 97 fL    MCH 29.8 26.6 - 33.0 pg    MCHC 33.4 31.5 - 35.7 g/dL    RDW 13.0 11.7 - 15.4 %    PLATELET 210 827 - 900 x10E3/uL    NEUTROPHILS 54 Not Estab. %    Lymphocytes 28 Not Estab. %    MONOCYTES 11 Not Estab. %    EOSINOPHILS 5 Not Estab. %    BASOPHILS 2 Not Estab. %    ABS. NEUTROPHILS 1.8 1.4 - 7.0 x10E3/uL    Abs Lymphocytes 0.9 0.7 - 3.1 x10E3/uL    ABS. MONOCYTES 0.4 0.1 - 0.9 x10E3/uL    ABS. EOSINOPHILS 0.2 0.0 - 0.4 x10E3/uL    ABS. BASOPHILS 0.1 0.0 - 0.2 x10E3/uL    IMMATURE GRANULOCYTES 0 Not Estab. %    ABS. IMM.  GRANS. 0.0 0.0 - 0.1 [id number]   METABOLIC PANEL, COMPREHENSIVE   Result Value Ref Range    Glucose 93 65 - 99 mg/dL    BUN 18 8 - 27 mg/dL    Creatinine 0.91 0.57 - 1.00 mg/dL    GFR est non-AA 57 (L) >59 mL/min/1.73    GFR est AA 66 >59 mL/min/1.73    BUN/Creatinine ratio 20 12 - 28    Sodium 140 134 - 144 mmol/L    Potassium 3.9 3.5 - 5.2 mmol/L    Chloride 102 96 - 106 mmol/L    CO2 26 20 - 29 mmol/L    Calcium 9.7 8.7 - 10.3 mg/dL    Protein, total 6.4 6.0 - 8.5 g/dL    Albumin 4.2 3.6 - 4.6 g/dL    GLOBULIN, TOTAL 2.2 1.5 - 4.5 g/dL    A-G Ratio 1.9 1.2 - 2.2    Bilirubin, total 0.6 0.0 - 1.2 mg/dL    Alk. phosphatase 106 48 - 121 IU/L    AST (SGOT) 19 0 - 40 IU/L    ALT (SGPT) 11 0 - 32 IU/L   LIPID PANEL   Result Value Ref Range    Cholesterol, total 136 100 - 199 mg/dL    Triglyceride 72 0 - 149 mg/dL    HDL Cholesterol 37 (L) >39 mg/dL    VLDL, calculated 15 5 - 40 mg/dL    LDL, calculated 84 0 - 99 mg/dL   T4, FREE   Result Value Ref Range    T4, Free 1.11 0.82 - 1.77 ng/dL   TSH 3RD GENERATION   Result Value Ref Range    TSH 7.530 (H) 0.450 - 4.500 uIU/mL   VITAMIN D, 25 HYDROXY   Result Value Ref Range    VITAMIN D, 25-HYDROXY 24.7 (L) 30.0 - 100.0 ng/mL         ASSESSMENT AND PLAN    ICD-10-CM ICD-9-CM    1. Medicare annual wellness visit, subsequent  Z00.00 V70.0    2. Essential hypertension  I10 401.9    3. Generalized anxiety disorder  F41.1 300.02    4. Acquired hypothyroidism  E03.9 244.9 levothyroxine (SYNTHROID) 75 mcg tablet      TSH 3RD GENERATION      TSH 3RD GENERATION   5. Hypercholesterolemia  E78.00 272.0    6. Mild cognitive impairment  G31.84 331.83    7. Vitamin D deficiency  E55.9 268.9 cholecalciferol (VITAMIN D3) (1000 Units /25 mcg) tablet   8. Screening for depression  Z13.31 V79.0 Arsen Macedo   9. Advance directive on file  Z78.9 V49.89      Discussed lab results from 8/6/21.   Normal kidney and liver tests, blood counts, and cholesterol.  Stay on atorvastatin.  Mildly low WBC count; nothing to worry about.  Thyroid level still too low.  Will make sure she is taking it correctly.  If so, plan to increase levothyroxine from 50 to 75 mcg daily.  Vitamin D level a little low, unchanged from 8 months ago. Joan Hennessy cholecalciferol 2000 units daily. Diagnoses and all orders for this visit:    1. Medicare annual wellness visit, subsequent    2. Essential hypertension  Elevated. Has white coat syndrome. Continue present management. Advised to bring her home BP monitor to next clinic visit to compare to clinic monitor. 3. Generalized anxiety disorder  Better controlled on Effexor XR 25 mg daily. 4. Acquired hypothyroidism  Not controlled. Increase levothyroxine dose from 50 mcg to 75 mcg daily.  -     Start levothyroxine (SYNTHROID) 75 mcg tablet; Take 1 Tablet by mouth Daily (before breakfast). -     TSH 3RD GENERATION; Future    5. Hypercholesterolemia  Well-controlled. Continue atorvastatin 40 mg daily. 6. Mild cognitive impairment  Stable. Continue Aricept 5 mg daily. 7. Vitamin D deficiency  -     Start over-the-counter cholecalciferol (VITAMIN D3) (1000 Units /25 mcg) tablet; Take 2 Tablets by mouth daily. Buy from any drug store. Indications: low vitamin D levels    8. Screening for depression  -     DEPRESSION SCREEN ANNUAL    9. Advance directive on file      Follow-up and Dispositions    · Return in about 3 months (around 11/18/2021), or if symptoms worsen or fail to improve, for HTN, thyroid; have thyroid lab done 1 week before appointment. I have discussed the diagnosis with the patient and the intended plan as seen in the above orders. Patient is in agreement. The patient has received an after-visit summary and questions were answered concerning future plans. I have discussed medication side effects and warnings with the patient as well.

## 2021-08-18 NOTE — PROGRESS NOTES
Identified pt with two pt identifiers. Reviewed record in preparation for visit and have obtained necessary documentation. All patient medications has been reviewed. Chief Complaint   Patient presents with   [facility] Annual Wellness Visit     Additional information about chief complaint:    Visit Vitals  BP (!) 171/105 (BP 1 Location: Left upper arm, BP Patient Position: Sitting, BP Cuff Size: Large adult)   Pulse 85   Temp 98.1 °F (36.7 °C) (Oral)   Resp 16   Ht 5' 4\" (1.626 m)   Wt 149 lb (67.6 kg)   SpO2 97%   BMI 25.58 kg/m²       Health Maintenance Due   Topic    Shingrix Vaccine Age 50> (1 of 2)       1. Have you been to the ER, urgent care clinic since your last visit? Hospitalized since your last visit? No     2. Have you seen or consulted any other health care providers outside of the [address] [place] [address] since your last visit? Include any pap smears or colon screening.   No   bdg

## 2021-08-18 NOTE — PROGRESS NOTES
This is the Subsequent Medicare Annual Wellness Exam, performed 12 months or more after the Initial AWV or the last Subsequent AWV    I have reviewed the patient's medical history in detail and updated the computerized patient record. Assessment/Plan   Education and counseling provided:  Are appropriate based on today's review and evaluation  End-of-Life planning (with patient's consent)    1. Medicare annual wellness visit, subsequent  2. Screening for depression  -     DEPRESSION SCREEN ANNUAL       Depression Risk Factor Screening     3 most recent PHQ Screens 8/18/2021   Little interest or pleasure in doing things Not at all   Feeling down, depressed, irritable, or hopeless Not at all   Total Score PHQ 2 0   Trouble falling or staying asleep, or sleeping too much -   Feeling tired or having little energy -   Poor appetite, weight loss, or overeating -   Feeling bad about yourself - or that you are a failure or have let yourself or your family down -   Trouble concentrating on things such as school, work, reading, or watching TV -   Moving or speaking so slowly that other people could have noticed; or the opposite being so fidgety that others notice -   Thoughts of being better off dead, or hurting yourself in some way -   PHQ 9 Score -       Alcohol Risk Screen    Do you average more than 1 drink per night or more than 7 drinks a week:  No    On any one occasion in the past three months have you have had more than 3 drinks containing alcohol:  No        Functional Ability and Level of Safety    Hearing: Hearing is good. Activities of Daily Living: The home contains: handrails and grab bars  Patient does total self care      Ambulation: with no difficulty     Fall Risk:  Fall Risk Assessment, last 12 mths 8/18/2021   Able to walk? Yes   Fall in past 12 months? 0   Do you feel unsteady? 0   Are you worried about falling 0   Number of falls in past 12 months -   Fall with injury?  -      Abuse Screen:  Patient is not abused       Cognitive Screening    Has your family/caregiver stated any concerns about your memory: no     Cognitive Screening: Abnormal - MOCA test    Health Maintenance Due     Health Maintenance Due   Topic Date Due    Shingrix Vaccine Age 49> (1 of 2) Never done       Patient Care Team   Patient Care Team:  David Phoenix MD as PCP - General (Internal Medicine)  David Phoenix MD as PCP - [facility] Empaneled Provider  Jenny Sauceda MD (Pain Management)  Justina Ann MD (Ophthalmology)  Pilar Mir MD as Physician (Cardiology)    History     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, N18.2    Spinal stenosis M48.00    Cervical neck pain with evidence of disc disease M50.90    GERD (gastroesophageal reflux disease) K21.9    Advance directive on file Z78.9    Osteopenia with high risk of fracture M85.80    Hypercholesterolemia E78.00    LBBB (left bundle branch block) I44.7    Overweight (BMI 25.0-29. 9) E66.3    Vitamin D deficiency E55.9    Acquired hypothyroidism E03.9    Mild cognitive impairment G31.84     Past Medical History:   Diagnosis Date    CAD (coronary artery disease)     GERD (gastroesophageal reflux disease)     Hypercholesterolemia     Hypertension     Plantar fasciitis 3/1/2010    Rotator cuff rupture 3/12/2013    1/13  MRI shows complete tear of supraspinatus with retraction, other are OK     SOB (shortness of breath) 6/14/2018    Tubular adenoma of colon 8/4/2014 July 2014     Tubular adenoma of rectum 5/19/2019 5/19/19    UTI (urinary tract infection) 7/5/2018      Past Surgical History:   Procedure Laterality Date    COLONOSCOPY N/A 5/15/2019    COLONOSCOPY performed [facility] J[facility] at [facility]COPY    COLONOSCOPY,FLORA NICHOLAS,SNARE  5/15/2019         HX APPENDECTOMY      Age 25    HX CORONARY STENT PLACEMENT      HX HEART CATHETERIZATION  07/05/2018    HX HEENT Bilateral     cataracts    HX ORTHOPAEDIC  1996    Lumbar laminectomy    HX PTCA      AK ABDOMEN SURGERY PROC UNLISTED  2005    Laproscopic colon polyp excision Dr. Luanne Briscoe  07/05/2018    PCI/JAMISON to LAD and RCA    AK COLSC FLX W/RMVL OF TUMOR POLYP LESION SNARE TQ  7/18/2014          Current Outpatient Medications   Medication Sig Dispense Refill    levothyroxine (SYNTHROID) 50 mcg tablet Take 1 Tablet by mouth Daily (before breakfast). 90 Tablet 0    venlafaxine-SR (EFFEXOR-XR) 37.5 mg capsule Take 1 Capsule by mouth daily. 30 Capsule 2    atorvastatin (LIPITOR) 40 mg tablet Take 1 tablet by mouth once daily 90 Tab 3    OTHER CBD oil taking 2 drops twice a day for cervical stenosis      metoprolol succinate (TOPROL-XL) 25 mg XL tablet Take 1 Tab by mouth daily. 45 Tab 3    amLODIPine (NORVASC) 5 mg tablet Take 1 tablet by mouth once daily 90 Tab 3    cloNIDine HCL (CATAPRES) 0.1 mg tablet Take 1 tablet by mouth twice daily (Patient taking differently: Take 0.1 mg by mouth two (2) times a day.) 180 Tab 3    losartan (COZAAR) 100 mg tablet Take 1 tablet by mouth once daily 90 Tab 3    furosemide (LASIX) 20 mg tablet Take 1 tablet by mouth once daily 90 Tab 3    aspirin 81 mg chewable tablet Take 1 Tab by mouth daily. 30 Tab 0    acetaminophen (TYLENOL ARTHRITIS PAIN) 650 mg TbER Take 650 mg by mouth as needed.  polyethylene glycol (MIRALAX) 17 gram/dose powder Take 17 g by mouth as needed.        Allergies   Allergen Reactions    Contrast Agent [Iodine] Hives    Penicillins Hives    Bystolic [Nebivolol] Other (comments)     abd pain    Cardizem [Diltiazem Hcl] Rash    Cardura [Doxazosin] Unknown (comments)    Codeine Nausea Only    Cymbalta [Duloxetine] Other (comments)     Abdominal pain, anxiety    Donepezil Other (comments)     Muscle cramps in legs    Gabapentin Other (comments)     Made her feel crazy    Prinivil [Lisinopril] Unknown (comments)    Tekturna [Aliskiren] Other (comments)     abd cramps    Tenormin [Atenolol] Unknown (comments)       Family History   Problem Relation Age of Onset    Heart Disease Mother     Stroke Mother     Cancer Father     Cancer Brother     Heart Disease Brother     Cancer Brother      Social History     Tobacco Use    Smoking status: Former Smoker     Packs/day: 1.00     Years: 10.00     Pack years: 10.00     Quit date: 1982     Years since quittin.4    Smokeless tobacco: Never Used   Substance Use Topics    Alcohol use: No         Eve Alford MD

## 2021-10-04 DIAGNOSIS — I10 HYPERTENSION, ESSENTIAL: ICD-10-CM

## 2021-10-04 DIAGNOSIS — G31.84 MILD COGNITIVE IMPAIRMENT: ICD-10-CM

## 2021-10-04 DIAGNOSIS — I25.111 CORONARY ARTERY DISEASE INVOLVING NATIVE CORONARY ARTERY OF NATIVE HEART WITH ANGINA PECTORIS WITH DOCUMENTED SPASM (HCC): ICD-10-CM

## 2021-10-04 RX ORDER — METOPROLOL SUCCINATE 25 MG/1
25 TABLET, EXTENDED RELEASE ORAL DAILY
Qty: 90 TABLET | Refills: 0 | Status: SHIPPED | OUTPATIENT
Start: 2021-10-04 | End: 2022-05-27

## 2021-10-04 RX ORDER — AMLODIPINE BESYLATE 5 MG/1
TABLET ORAL
Qty: 90 TABLET | Refills: 0 | Status: SHIPPED | OUTPATIENT
Start: 2021-10-04 | End: 2022-05-27

## 2021-10-04 RX ORDER — DONEPEZIL HYDROCHLORIDE 5 MG/1
TABLET, FILM COATED ORAL
Qty: 30 TABLET | Refills: 0 | OUTPATIENT
Start: 2021-10-04

## 2021-10-04 RX ORDER — LOSARTAN POTASSIUM 100 MG/1
TABLET ORAL
Qty: 90 TABLET | Refills: 0 | Status: SHIPPED | OUTPATIENT
Start: 2021-10-04 | End: 2022-05-27

## 2021-11-12 LAB — TSH SERPL DL<=0.005 MIU/L-ACNC: 5.42 UIU/ML (ref 0.45–4.5)

## 2021-11-14 NOTE — PROGRESS NOTES
Will discuss at 11/23/21 appt. TSH better than 3 months ago (7.530) but still high at 5.420. Was increased from 50 to 75 mcg daily on 8/18/21. Plan to increase levothyroxine to 100 mcg daily.

## 2021-11-23 ENCOUNTER — OFFICE VISIT (OUTPATIENT)
Dept: INTERNAL MEDICINE CLINIC | Age: 86
End: 2021-11-23
Payer: COMMERCIAL

## 2021-11-23 VITALS
OXYGEN SATURATION: 95 % | TEMPERATURE: 98.6 F | HEART RATE: 71 BPM | DIASTOLIC BLOOD PRESSURE: 103 MMHG | BODY MASS INDEX: 25.44 KG/M2 | SYSTOLIC BLOOD PRESSURE: 181 MMHG | RESPIRATION RATE: 16 BRPM | HEIGHT: 64 IN | WEIGHT: 149 LBS

## 2021-11-23 DIAGNOSIS — I10 ESSENTIAL HYPERTENSION: ICD-10-CM

## 2021-11-23 DIAGNOSIS — E03.9 ACQUIRED HYPOTHYROIDISM: Primary | ICD-10-CM

## 2021-11-23 DIAGNOSIS — M50.90 CERVICAL NECK PAIN WITH EVIDENCE OF DISC DISEASE: ICD-10-CM

## 2021-11-23 DIAGNOSIS — E55.9 VITAMIN D DEFICIENCY: ICD-10-CM

## 2021-11-23 DIAGNOSIS — Z23 NEEDS FLU SHOT: ICD-10-CM

## 2021-11-23 DIAGNOSIS — F32.A MILD DEPRESSION: ICD-10-CM

## 2021-11-23 PROCEDURE — 99214 OFFICE O/P EST MOD 30 MIN: CPT | Performed by: INTERNAL MEDICINE

## 2021-11-23 PROCEDURE — 90694 VACC AIIV4 NO PRSRV 0.5ML IM: CPT | Performed by: INTERNAL MEDICINE

## 2021-11-23 PROCEDURE — 90471 IMMUNIZATION ADMIN: CPT | Performed by: INTERNAL MEDICINE

## 2021-11-23 RX ORDER — LEVOTHYROXINE SODIUM 100 UG/1
100 TABLET ORAL
Qty: 90 TABLET | Refills: 1 | Status: SHIPPED | OUTPATIENT
Start: 2021-11-23 | End: 2022-05-27

## 2021-11-23 NOTE — PROGRESS NOTES
Identified pt with two pt identifiers. Reviewed record in preparation for visit and have obtained necessary documentation. All patient medications has been reviewed. Chief Complaint   Patient presents with    Thyroid Problem    Hypertension     Additional information about chief complaint:    Visit Vitals  BP (!) 181/103 (BP 1 Location: Left upper arm, BP Patient Position: Sitting, BP Cuff Size: Large adult)   Pulse 71   Temp 98.6 °F (37 °C) (Oral)   Resp 16   Ht 5' 4\" (1.626 m)   Wt 149 lb (67.6 kg)   SpO2 95%   BMI 25.58 kg/m²       Health Maintenance Due   Topic    Shingrix Vaccine Age 50> (1 of 2)    Flu Vaccine (1)    COVID-19 Vaccine (3 - Booster for Moderna series)       1. Have you been to the ER, urgent care clinic since your last visit? Hospitalized since your last visit? No     2. Have you seen or consulted any other health care providers outside of the [address] [place] [address] since your last visit? Include any pap smears or colon screening.  No  bdg

## 2021-11-23 NOTE — PROGRESS NOTES
CC:   Chief Complaint   Patient presents with    Thyroid Problem    Hypertension       HISTORY OF PRESENT ILLNESS  Pilar Monae is a 80 y.o. female. Accompanied by her daughter, Alexandro Quinn. Presents for 3 month follow up evaluation. She has HTN with CKD stage 2, hypercholesterolemia, CAD s/p 2 stents in 7/18, mild cognitive impairment, hypothyroidism, GERD, chronic low back pain due to spinal stenosis s/p lumbar laminectomy in 1996, and osteopenia. Has unchanged chronic neck and back pain. Complains of feeling a little depressed; feels isolated due to pandemic, not socializing and getting out like she used to. Thyroid ROS: Denies fatigue, weight changes, heat/cold intolerance, bowel/skin changes or CVS symptoms. Her daughter has hypothyroidism also. Denies HA's, CP, SOB, dizziness, heart palpitations, or leg swelling. ROS  A complete review of systems was performed and is negative except for those mentioned in the HPI. Patient Active Problem List   Diagnosis Code    Essential hypertension I10    Spinal stenosis M48.00    Cervical neck pain with evidence of disc disease M50.90    GERD (gastroesophageal reflux disease) K21.9    Advance directive on file Z78.9    Osteopenia with high risk of fracture M85.80    Hypercholesterolemia E78.00    LBBB (left bundle branch block) I44.7    Overweight (BMI 25.0-29. 9) E66.3    Vitamin D deficiency E55.9    Acquired hypothyroidism E03.9    Mild cognitive impairment G31.84    Generalized anxiety disorder F41.1     Past Medical History:   Diagnosis Date    CAD (coronary artery disease)     GERD (gastroesophageal reflux disease)     Hypercholesterolemia     Hypertension     Plantar fasciitis 3/1/2010    Rotator cuff rupture 3/12/2013    1/13  MRI shows complete tear of supraspinatus with retraction, other are OK     SOB (shortness of breath) 6/14/2018    Tubular adenoma of colon 8/4/2014 July 2014     Tubular adenoma of rectum 5/19/2019 5/19/19    UTI (urinary tract infection) 7/5/2018     Allergies   Allergen Reactions    Contrast Agent [Iodine] Hives    Penicillins Hives    Bystolic [Nebivolol] Other (comments)     abd pain    Cardizem [Diltiazem Hcl] Rash    Cardura [Doxazosin] Unknown (comments)    Codeine Nausea Only    Cymbalta [Duloxetine] Other (comments)     Abdominal pain, anxiety    Donepezil Other (comments)     Muscle cramps in legs    Gabapentin Other (comments)     Made her feel crazy    Prinivil [Lisinopril] Unknown (comments)    Tekturna [Aliskiren] Other (comments)     abd cramps    Tenormin [Atenolol] Unknown (comments)       Current Outpatient Medications   Medication Sig Dispense Refill    amLODIPine (NORVASC) 5 mg tablet Take 1 tablet by mouth once daily 90 Tablet 0    furosemide (LASIX) 20 mg tablet Take 1 tablet by mouth once daily 90 Tablet 3    losartan (COZAAR) 100 mg tablet Take 1 tablet by mouth once daily 90 Tablet 0    metoprolol succinate (TOPROL-XL) 25 mg XL tablet Take 1 Tablet by mouth daily. 90 Tablet 0    levothyroxine (SYNTHROID) 75 mcg tablet Take 1 Tablet by mouth Daily (before breakfast). 90 Tablet 1    cholecalciferol (VITAMIN D3) (1000 Units /25 mcg) tablet Take 2 Tablets by mouth daily. Buy from any drug store. Indications: low vitamin D levels 180 Tablet 1    venlafaxine-SR (EFFEXOR-XR) 37.5 mg capsule Take 1 Capsule by mouth daily. 30 Capsule 2    atorvastatin (LIPITOR) 40 mg tablet Take 1 tablet by mouth once daily 90 Tab 3    OTHER CBD oil taking 2 drops twice a day for cervical stenosis      cloNIDine HCL (CATAPRES) 0.1 mg tablet Take 1 tablet by mouth twice daily (Patient taking differently: Take 0.1 mg by mouth two (2) times a day.) 180 Tab 3    aspirin 81 mg chewable tablet Take 1 Tab by mouth daily. 30 Tab 0    acetaminophen (TYLENOL ARTHRITIS PAIN) 650 mg TbER Take 650 mg by mouth as needed.       polyethylene glycol (MIRALAX) 17 gram/dose powder Take 17 g by mouth as needed. PHYSICAL EXAM  Visit Vitals  BP (!) 181/103 (BP 1 Location: Left upper arm, BP Patient Position: Sitting, BP Cuff Size: Large adult)   Pulse 71   Temp 98.6 °F (37 °C) (Oral)   Resp 16   Ht 5' 4\" (1.626 m)   Wt 149 lb (67.6 kg)   SpO2 95%   BMI 25.58 kg/m²       General: Well-developed and well-nourished, no distress. HEENT:  Head normocephalic/atraumatic, no scleral icterus  Neck: Supple. No lymphadenopathy or thyromegaly. Lungs:  Clear to ausculation bilaterally. Good air movement. Heart:  Regular rate and rhythm, normal S1 and S2, no murmur, gallop, or rub  Extremities: No clubbing, cyanosis, or edema. Neurological: Alert and oriented. Psychiatric: Normal mood and affect. Behavior is normal.     Results for orders placed or performed in visit on 08/18/21   TSH 3RD GENERATION   Result Value Ref Range    TSH 5.420 (H) 0.450 - 4.500 uIU/mL         ASSESSMENT AND PLAN    ICD-10-CM ICD-9-CM    1. Acquired hypothyroidism  E03.9 244.9 levothyroxine (SYNTHROID) 100 mcg tablet      TSH 3RD GENERATION      TSH 3RD GENERATION   2. Essential hypertension  R27 820.9 METABOLIC PANEL, BASIC      METABOLIC PANEL, BASIC   3. Cervical neck pain with evidence of disc disease  M50.90 722.91    4. Mild depression ([address]  F32.0 311    5. Needs flu shot  Z23 V04.81 FLU (FLUAD QUAD INFLUENZA VACCINE,QUAD,ADJUVANTED)   6. Vitamin D deficiency  E55.9 268.9 VITAMIN D, 25 HYDROXY      VITAMIN D, 25 HYDROXY     Discussed lab results from 11/11/21. TSH better than 3 months ago (7.530) but still high at 5.420. Was increased from 50 to 75 mcg daily on 8/18/21. Plan to increase levothyroxine to 100 mcg daily. Diagnoses and all orders for this visit:    1. Acquired hypothyroidism        -     Start levothyroxine (SYNTHROID) 100 mcg tablet; Take 1 Tablet by mouth Daily (before breakfast). -     TSH 3RD GENERATION; Future    2.  Essential hypertension  White coat syndrome.  -     METABOLIC PANEL, BASIC; Future    3. Cervical neck pain with evidence of disc disease    4. Mild depression ([address]    5. Needs flu shot  -     FLU (FLUAD QUAD INFLUENZA VACCINE,QUAD,ADJUVANTED)    6. Vitamin D deficiency  -     VITAMIN D, 25 HYDROXY; Future        Follow-up and Dispositions    · Return in about 3 months (around 2/23/2022), or if symptoms worsen or fail to improve, for Thyroid; have non-fasting labs 1 week prior to appointment. I have discussed the diagnosis with the patient and the intended plan as seen in the above orders. Patient is in agreement. The patient has received an after-visit summary and questions were answered concerning future plans. I have discussed medication side effects and warnings with the patient as well.

## 2021-11-23 NOTE — PATIENT INSTRUCTIONS
Vaccine Information Statement    Influenza (Flu) Vaccine (Inactivated or Recombinant): What You Need to Know    Many vaccine information statements are available in [place] and other languages. See www.immunize.org/vis. Hojas de información sobre vacunas están disponibles en español y en muchos otros idiomas. Visite www.immunize.org/vis. 1. Why get vaccinated? Influenza vaccine can prevent influenza (flu). Flu is a contagious disease that spreads around the [facility] every year, usually between October and May. Anyone can get the flu, but it is more dangerous for some people. Infants and young children, people 72 years and older, pregnant people, and people with certain health conditions or a weakened immune system are at greatest risk of flu complications. Pneumonia, bronchitis, sinus infections, and ear infections are examples of flu-related complications. If you have a medical condition, such as heart disease, cancer, or diabetes, flu can make it worse. Flu can cause fever and chills, sore throat, muscle aches, fatigue, cough, headache, and runny or stuffy nose. Some people may have vomiting and diarrhea, though this is more common in children than adults. In an average year, thousands of people in the [facility] die from flu, and many more are hospitalized. Flu vaccine prevents millions of illnesses and flu-related visits to the doctor each year. 2. Influenza vaccines     CDC recommends everyone 6 months and older get vaccinated every flu season. Children 6 months through 6years of age may need 2 doses during a single flu season. Everyone else needs only 1 dose each flu season. It takes about 2 weeks for protection to develop after vaccination. There are many flu viruses, and they are always changing. Each year a new flu vaccine is made to protect against the influenza viruses believed to be likely to cause disease in the upcoming flu season.  Even when the vaccine doesnt exactly match these viruses, it may still provide some protection. Influenza vaccine does not cause flu. Influenza vaccine may be given at the same time as other vaccines. 3. Talk with your health care provider    Tell your vaccination provider if the person getting the vaccine:   Has had an allergic reaction after a previous dose of influenza vaccine, or has any severe, life-threatening allergies    Has ever had Guillain-Barré Syndrome (also called GBS)    In some cases, your health care provider may decide to postpone influenza vaccination until a future visit. Influenza vaccine can be administered at any time during pregnancy. People who are or will be pregnant during influenza season should receive inactivated influenza vaccine. People with minor illnesses, such as a cold, may be vaccinated. People who are moderately or severely ill should usually wait until they recover before getting influenza vaccine. Your health care provider can give you more information. 4. Risks of a vaccine reaction     Soreness, redness, and swelling where the shot is given, fever, muscle aches, and headache can happen after influenza vaccination.  There may be a very small increased risk of Guillain-Barré Syndrome (GBS) after inactivated influenza vaccine (the flu shot). Yaa Lambert children who get the flu shot along with pneumococcal vaccine (PCV13) and/or DTaP vaccine at the same time might be slightly more likely to have a seizure caused by fever. Tell your health care provider if a child who is getting flu vaccine has ever had a seizure. People sometimes faint after medical procedures, including vaccination. Tell your provider if you feel dizzy or have vision changes or ringing in the ears. As with any medicine, there is a very remote chance of a vaccine causing a severe allergic reaction, other serious injury, or death. 5. What if there is a serious problem?     An allergic reaction could occur after the vaccinated person leaves the clinic. If you see signs of a severe allergic reaction (hives, swelling of the face and throat, difficulty breathing, a fast heartbeat, dizziness, or weakness), call 9-1-1 and get the person to the nearest hospital.    For other signs that concern you, call your health care provider. Adverse reactions should be reported to the Vaccine Adverse Event Reporting System (VAERS). Your health care provider will usually file this report, or you can do it yourself. Visit the VAERS website at www.vaers. [facility].gov or call 8-773.223.4493. VAERS is only for reporting reactions, and VAERS staff members do not give medical advice. 6. The National Vaccine Injury Compensation Program    The [facility] Vaccine Injury Compensation Program (VICP) is a federal program that was created to compensate people who may have been injured by certain vaccines. Claims regarding alleged injury or death due to vaccination have a time limit for filing, which may be as short as two years. Visit the VICP website at www.[facility].gov/vaccinecompensation or call 2-228.299.5114 to learn about the program and about filing a claim. 7. How can I learn more?  Ask your health care provider.  Call your local or state health department.  Visit the website of the Food and Drug Administration (FDA) for vaccine package inserts and additional information at www.fda.gov/vaccines-blood-biologics/vaccines.  Contact the Centers for Disease Control and Prevention (CDC):  - Call 5-243.597.8330 (1-800-CDC-INFO) or  - Visit CDCs influenza website at www.cdc.gov/flu. Vaccine Information Statement   Inactivated Influenza Vaccine   8/6/2021  42 NORAH Noel 863QH-29   Department of Health and Human Services  Centers for Disease Control and Prevention    Office Use Only

## 2021-11-23 NOTE — PROGRESS NOTES
After verbal order read back of Dr. Rosana Novak, patient received High Dose Flu Shot (Adjuvanted Fluad) in left deltoid. [address]: 43044-946-71 Lot: [id number] Exp: 06/30/2022. Patient tolerated procedure without complaints and received VIS.

## 2022-02-15 DIAGNOSIS — G31.84 MILD COGNITIVE IMPAIRMENT: ICD-10-CM

## 2022-02-15 NOTE — TELEPHONE ENCOUNTER
Requested Prescriptions     Pending Prescriptions Disp Refills    donepeziL (ARICEPT) 5 mg tablet 30 Tablet 3     Sig: Take 1 Tablet by mouth nightly.

## 2022-02-15 NOTE — TELEPHONE ENCOUNTER
PCP: Tahmina Gramajo MD     Last appt: 11/23/2021   Future Appointments   Date Time Provider Eula Mack   6/9/2022  3:00 PM Derian Botello MD [facility] BS AMB        Requested Prescriptions     Pending Prescriptions Disp Refills    donepeziL (ARICEPT) 5 mg tablet 30 Tablet 3     Sig: Take 1 Tablet by mouth nightly.

## 2022-02-16 RX ORDER — DONEPEZIL HYDROCHLORIDE 5 MG/1
5 TABLET, FILM COATED ORAL
Qty: 30 TABLET | Refills: 3 | Status: SHIPPED | OUTPATIENT
Start: 2022-02-16 | End: 2022-06-15

## 2022-03-18 PROBLEM — E03.9 ACQUIRED HYPOTHYROIDISM: Status: ACTIVE | Noted: 2021-04-22

## 2022-03-19 PROBLEM — I44.7 LBBB (LEFT BUNDLE BRANCH BLOCK): Status: ACTIVE | Noted: 2018-07-24

## 2022-03-19 PROBLEM — I10 WHITE COAT SYNDROME WITH DIAGNOSIS OF HYPERTENSION: Status: ACTIVE | Noted: 2021-11-23

## 2022-03-19 PROBLEM — F41.1 GENERALIZED ANXIETY DISORDER: Status: ACTIVE | Noted: 2021-08-18

## 2022-03-19 PROBLEM — E66.3 OVERWEIGHT (BMI 25.0-29.9): Status: ACTIVE | Noted: 2019-12-02

## 2022-03-19 PROBLEM — E78.00 HYPERCHOLESTEROLEMIA: Status: ACTIVE | Noted: 2017-01-19

## 2022-03-20 PROBLEM — E55.9 VITAMIN D DEFICIENCY: Status: ACTIVE | Noted: 2020-11-27

## 2022-03-20 PROBLEM — G31.84 MILD COGNITIVE IMPAIRMENT: Status: ACTIVE | Noted: 2021-05-21

## 2022-05-11 LAB
25(OH)D3+25(OH)D2 SERPL-MCNC: 24.6 NG/ML (ref 30–100)
BUN SERPL-MCNC: 18 MG/DL (ref 8–27)
BUN/CREAT SERPL: 20 (ref 12–28)
CALCIUM SERPL-MCNC: 10.1 MG/DL (ref 8.7–10.3)
CHLORIDE SERPL-SCNC: 100 MMOL/L (ref 96–106)
CO2 SERPL-SCNC: 23 MMOL/L (ref 20–29)
CREAT SERPL-MCNC: 0.91 MG/DL (ref 0.57–1)
EGFR: 61 ML/MIN/1.73
GLUCOSE SERPL-MCNC: 101 MG/DL (ref 65–99)
POTASSIUM SERPL-SCNC: 4.3 MMOL/L (ref 3.5–5.2)
SODIUM SERPL-SCNC: 139 MMOL/L (ref 134–144)
TSH SERPL DL<=0.005 MIU/L-ACNC: 3.13 UIU/ML (ref 0.45–4.5)

## 2022-05-17 NOTE — PROGRESS NOTES
Letter sent: Your labs showed normal thyroid and kidney tests. Your vitamin D level was a little low. Make sure you are taking vitamin D3 2000 units daily.  Schedule an appointment for a Medicare Annual Wellness Visit with me in August.

## 2022-05-26 DIAGNOSIS — E03.9 ACQUIRED HYPOTHYROIDISM: ICD-10-CM

## 2022-05-26 DIAGNOSIS — F41.1 GENERALIZED ANXIETY DISORDER: ICD-10-CM

## 2022-05-26 DIAGNOSIS — I10 HYPERTENSION, ESSENTIAL: ICD-10-CM

## 2022-05-26 DIAGNOSIS — I25.111 CORONARY ARTERY DISEASE INVOLVING NATIVE CORONARY ARTERY OF NATIVE HEART WITH ANGINA PECTORIS WITH DOCUMENTED SPASM (HCC): ICD-10-CM

## 2022-05-27 RX ORDER — METOPROLOL SUCCINATE 25 MG/1
TABLET, EXTENDED RELEASE ORAL
Qty: 90 TABLET | Refills: 0 | Status: SHIPPED | OUTPATIENT
Start: 2022-05-27 | End: 2022-07-27

## 2022-05-27 RX ORDER — LOSARTAN POTASSIUM 100 MG/1
TABLET ORAL
Qty: 90 TABLET | Refills: 0 | Status: SHIPPED | OUTPATIENT
Start: 2022-05-27 | End: 2022-07-27

## 2022-05-27 RX ORDER — LEVOTHYROXINE SODIUM 100 UG/1
TABLET ORAL
Qty: 90 TABLET | Refills: 0 | Status: SHIPPED | OUTPATIENT
Start: 2022-05-27 | End: 2022-07-27

## 2022-05-27 RX ORDER — AMLODIPINE BESYLATE 5 MG/1
TABLET ORAL
Qty: 90 TABLET | Refills: 0 | Status: SHIPPED | OUTPATIENT
Start: 2022-05-27 | End: 2022-07-27

## 2022-05-27 RX ORDER — VENLAFAXINE HYDROCHLORIDE 37.5 MG/1
CAPSULE, EXTENDED RELEASE ORAL
Qty: 30 CAPSULE | Refills: 0 | Status: SHIPPED | OUTPATIENT
Start: 2022-05-27 | End: 2022-06-22

## 2022-06-02 NOTE — TELEPHONE ENCOUNTER
Leann Dean MD; [facility] Front Office  Caller: Unspecified (1 week ago,  7:18 PM)  Left message for pt to call back to schedule medicare wellness cisit in September

## 2022-07-27 DIAGNOSIS — E03.9 ACQUIRED HYPOTHYROIDISM: ICD-10-CM

## 2022-07-27 DIAGNOSIS — I10 HYPERTENSION, ESSENTIAL: ICD-10-CM

## 2022-07-27 DIAGNOSIS — I25.111 CORONARY ARTERY DISEASE INVOLVING NATIVE CORONARY ARTERY OF NATIVE HEART WITH ANGINA PECTORIS WITH DOCUMENTED SPASM (HCC): ICD-10-CM

## 2022-07-27 RX ORDER — LEVOTHYROXINE SODIUM 100 UG/1
TABLET ORAL
Qty: 90 TABLET | Refills: 0 | Status: SHIPPED | OUTPATIENT
Start: 2022-07-27

## 2022-07-27 RX ORDER — AMLODIPINE BESYLATE 5 MG/1
TABLET ORAL
Qty: 90 TABLET | Refills: 0 | Status: SHIPPED | OUTPATIENT
Start: 2022-07-27

## 2022-07-27 RX ORDER — LOSARTAN POTASSIUM 100 MG/1
TABLET ORAL
Qty: 90 TABLET | Refills: 0 | Status: SHIPPED | OUTPATIENT
Start: 2022-07-27 | End: 2022-10-21

## 2022-07-27 RX ORDER — METOPROLOL SUCCINATE 25 MG/1
TABLET, EXTENDED RELEASE ORAL
Qty: 90 TABLET | Refills: 0 | Status: SHIPPED | OUTPATIENT
Start: 2022-07-27

## 2022-09-18 NOTE — PROGRESS NOTES
This is the Subsequent Medicare Annual Wellness Exam, performed 12 months or more after the Initial AWV or the last Subsequent AWV    I have reviewed the patient's medical history in detail and updated the computerized patient record. Assessment/Plan   Education and counseling provided:  Are appropriate based on today's review and evaluation  Influenza Vaccine  COVID booster vaccine    1. Medicare annual wellness visit, subsequent  2. Acquired hypothyroidism  -     TSH 3RD GENERATION; Future  3. Essential hypertension  -     METABOLIC PANEL, COMPREHENSIVE; Future  -     CBC WITH AUTOMATED DIFF; Future  4. Hypercholesterolemia  -     LIPID PANEL; Future  5. Mild cognitive impairment  6. Major depressive disorder, single episode, mild ([facility] [address]  7. Vitamin D deficiency  -     VITAMIN D, 25 HYDROXY; Future  8. Screening for depression  -     DEPRESSION SCREEN ANNUAL  9.  Needs flu shot  -     INFLUENZA, FLUAD, (AGE 72 Y+), IM, PF, 0.5 ML       Depression Risk Factor Screening     3 most recent PHQ Screens 11/23/2021   Little interest or pleasure in doing things Not at all   Feeling down, depressed, irritable, or hopeless Several days   Total Score PHQ 2 1   Trouble falling or staying asleep, or sleeping too much -   Feeling tired or having little energy -   Poor appetite, weight loss, or overeating -   Feeling bad about yourself - or that you are a failure or have let yourself or your family down -   Trouble concentrating on things such as school, work, reading, or watching TV -   Moving or speaking so slowly that other people could have noticed; or the opposite being so fidgety that others notice -   Thoughts of being better off dead, or hurting yourself in some way -   PHQ 9 Score -       Alcohol & Drug Abuse Risk Screen    Do you average more than 1 drink per night or more than 7 drinks a week:  No    On any one occasion in the past three months have you have had more than 3 drinks containing alcohol:  No Functional Ability and Level of Safety    Hearing: Hearing is good. Activities of Daily Living: The home contains: grab bars  Patient does total self care      Ambulation: with no difficulty     Fall Risk:  Fall Risk Assessment, last 12 mths 11/23/2021   Able to walk? Yes   Fall in past 12 months? 0   Do you feel unsteady? 0   Are you worried about falling 0   Number of falls in past 12 months -   Fall with injury? -      Abuse Screen:  Patient is not abused       Cognitive Screening    Has your family/caregiver stated any concerns about your memory: no     Cognitive Screening: Abnormal-recalled 2/3 words after 5 mins    Health Maintenance Due     Health Maintenance Due   Topic Date Due    COVID-19 Vaccine (3 - Booster for Moderna series) 09/30/2021    Lipid Screen  08/06/2022    Flu Vaccine (1) 09/01/2022       Patient Care Team   Patient Care Team:  Jojo Crawford MD as PCP - General (Internal Medicine Physician)  Jojo Crawford MD as PCP - [facility] Empaneled Provider  Trevin Lua MD (Pain Management)  Susan Lugo MD (Ophthalmology)  Maurice Pedro MD as Physician (Cardiovascular Disease Physician)    History     Patient Active Problem List   Diagnosis Code    Essential hypertension I10    Spinal stenosis M48.00    Cervical neck pain with evidence of disc disease M50.90    GERD (gastroesophageal reflux disease) K21.9    Advance directive on file Z78.9    Osteopenia with high risk of fracture M85.80    Hypercholesterolemia E78.00    LBBB (left bundle branch block) I44.7    Overweight (BMI 25.0-29. 9) E66.3    Vitamin D deficiency E55.9    Acquired hypothyroidism E03.9    Mild cognitive impairment G31.84    Generalized anxiety disorder F41. 1    White coat syndrome with diagnosis of hypertension I10     Past Medical History:   Diagnosis Date    CAD (coronary artery disease)     GERD (gastroesophageal reflux disease)     Hypercholesterolemia     Hypertension     Plantar fasciitis 3/1/2010 Rotator cuff rupture 3/12/2013    1/13  MRI shows complete tear of supraspinatus with retraction, other are OK     SOB (shortness of breath) 6/14/2018    Tubular adenoma of colon 8/4/2014 July 2014     Tubular adenoma of rectum 5/19/2019 5/19/19    UTI (urinary tract infection) 7/5/2018      Past Surgical History:   Procedure Laterality Date    COLONOSCOPY N/A 5/15/2019    COLONOSCOPY performed by Kelsey Miller MD at [facility]  5/15/2019         HX APPENDECTOMY      Age 25    HX CORONARY STENT PLACEMENT      HX HEART CATHETERIZATION  07/05/2018    HX HEENT Bilateral     cataracts    HX ORTHOPAEDIC  1996    Lumbar laminectomy    HX PTCA      CT ABDOMEN SURGERY PROC UNLISTED  2005    Laproscopic colon polyp excision Dr. Hank Jones  07/05/2018    PCI/JAMISON to LAD and RCA    CT COLSC FLX W/RMVL OF TUMOR POLYP LESION SNARE TQ  7/18/2014          Current Outpatient Medications   Medication Sig Dispense Refill    amLODIPine (NORVASC) 5 mg tablet Take 1 tablet by mouth once daily 90 Tablet 0    losartan (COZAAR) 100 mg tablet Take 1 tablet by mouth once daily 90 Tablet 0    levothyroxine (SYNTHROID) 100 mcg tablet TAKE 1 TABLET BY MOUTH ONCE DAILY BEFORE BREAKFAST 90 Tablet 0    metoprolol succinate (TOPROL-XL) 25 mg XL tablet Take 1 tablet by mouth once daily 90 Tablet 0    venlafaxine-SR (EFFEXOR-XR) 37.5 mg capsule Take 1 capsule by mouth once daily 30 Capsule 2    donepeziL (ARICEPT) 5 mg tablet Take 1 tablet by mouth nightly 30 Tablet 3    furosemide (LASIX) 20 mg tablet Take 1 tablet by mouth once daily 90 Tablet 3    cholecalciferol (VITAMIN D3) (1000 Units /25 mcg) tablet Take 2 Tablets by mouth daily. Buy from any drug store.   Indications: low vitamin D levels 180 Tablet 1    atorvastatin (LIPITOR) 40 mg tablet Take 1 tablet by mouth once daily 90 Tab 3    OTHER CBD oil taking 2 drops twice a day for cervical stenosis cloNIDine HCL (CATAPRES) 0.1 mg tablet Take 1 tablet by mouth twice daily (Patient taking differently: Take 0.1 mg by mouth two (2) times a day.) 180 Tab 3    aspirin 81 mg chewable tablet Take 1 Tab by mouth daily. 30 Tab 0    acetaminophen (TYLENOL ARTHRITIS PAIN) 650 mg TbER Take 650 mg by mouth as needed. polyethylene glycol (MIRALAX) 17 gram/dose powder Take 17 g by mouth as needed.        Allergies   Allergen Reactions    Contrast Agent [Iodine] Hives    Penicillins Hives    Bystolic [Nebivolol] Other (comments)     abd pain    Cardizem [Diltiazem Hcl] Rash    Cardura [Doxazosin] Unknown (comments)    Codeine Nausea Only    Cymbalta [Duloxetine] Other (comments)     Abdominal pain, anxiety    Donepezil Other (comments)     Muscle cramps in legs    Gabapentin Other (comments)     Made her feel crazy    Prinivil [Lisinopril] Unknown (comments)    Tekturna [Aliskiren] Other (comments)     abd cramps    Tenormin [Atenolol] Unknown (comments)       Family History   Problem Relation Age of Onset    Heart Disease Mother     Stroke Mother     Cancer Father     Cancer Brother     Heart Disease Brother     Cancer Brother      Social History     Tobacco Use    Smoking status: Former     Packs/day: 1.00     Years: 10.00     Pack years: 10.00     Types: Cigarettes     Quit date: 1982     Years since quittin.4    Smokeless tobacco: Never   Substance Use Topics    Alcohol use: No         Laura Lopez MD

## 2022-09-18 NOTE — PROGRESS NOTES
CC:   Chief Complaint   Patient presents with    Annual Wellness Visit    Hypertension    Thyroid Problem    Cholesterol Problem       HISTORY OF PRESENT ILLNESS  Aspen Tse is a 80 y.o. female. Presents for Medicare AWV and 10 month follow up evaluation. She has HTN with CKD stage 2, hypercholesterolemia, CAD s/p 2 stents in 7/18, mild cognitive impairment, hypothyroidism, GERD, chronic low back pain due to spinal stenosis s/p lumbar laminectomy in 1996, and osteopenia. Has chronic neck and low back pain. Occasional right wrist pain. Uses Voltaren gel with relief. Denies HA's, CP, SOB, dizziness, or heart palpitations. Takes Lasix 20 mg daily that helps swelling at hands and legs. Gets regular exercise walking 15 mins a day 5 days week. Walks up and down stairs in her home regularly. Thyroid ROS: denies fatigue, weight changes, heat/cold intolerance, bowel/skin changes or CVS symptoms. Taking medication as prescribed. Last TSH was normal (5/10/22: 3.130). Tolerating Aricept with no side effects. Depression and anxiety better on Effexor XR 37.5 mg daily; says she has noticed a difference. Lives alone. Does own housework. Drives short distances near her home with no issues. Patient Active Problem List   Diagnosis Code    Essential hypertension I10    Spinal stenosis M48.00    Cervical neck pain with evidence of disc disease M50.90    GERD (gastroesophageal reflux disease) K21.9    Advance directive on file Z78.9    Osteopenia with high risk of fracture M85.80    Hypercholesterolemia E78.00    LBBB (left bundle branch block) I44.7    Overweight (BMI 25.0-29. 9) E66.3    Vitamin D deficiency E55.9    Acquired hypothyroidism E03.9    Mild cognitive impairment G31.84    Generalized anxiety disorder F41. 1    White coat syndrome with diagnosis of hypertension I10     Past Medical History:   Diagnosis Date    CAD (coronary artery disease)     GERD (gastroesophageal reflux disease) Hypercholesterolemia     Hypertension     Plantar fasciitis 3/1/2010    Rotator cuff rupture 3/12/2013    1/13  MRI shows complete tear of supraspinatus with retraction, other are OK     SOB (shortness of breath) 6/14/2018    Tubular adenoma of colon 8/4/2014 July 2014     Tubular adenoma of rectum 5/19/2019 5/19/19    UTI (urinary tract infection) 7/5/2018     Allergies   Allergen Reactions    Contrast Agent [Iodine] Hives    Penicillins Hives    Bystolic [Nebivolol] Other (comments)     abd pain    Cardizem [Diltiazem Hcl] Rash    Cardura [Doxazosin] Unknown (comments)    Codeine Nausea Only    Cymbalta [Duloxetine] Other (comments)     Abdominal pain, anxiety    Donepezil Other (comments)     Muscle cramps in legs    Gabapentin Other (comments)     Made her feel crazy    Prinivil [Lisinopril] Unknown (comments)    Tekturna [Aliskiren] Other (comments)     abd cramps    Tenormin [Atenolol] Unknown (comments)       Current Outpatient Medications   Medication Sig Dispense Refill    amLODIPine (NORVASC) 5 mg tablet Take 1 tablet by mouth once daily 90 Tablet 0    losartan (COZAAR) 100 mg tablet Take 1 tablet by mouth once daily 90 Tablet 0    levothyroxine (SYNTHROID) 100 mcg tablet TAKE 1 TABLET BY MOUTH ONCE DAILY BEFORE BREAKFAST 90 Tablet 0    metoprolol succinate (TOPROL-XL) 25 mg XL tablet Take 1 tablet by mouth once daily 90 Tablet 0    venlafaxine-SR (EFFEXOR-XR) 37.5 mg capsule Take 1 capsule by mouth once daily 30 Capsule 2    donepeziL (ARICEPT) 5 mg tablet Take 1 tablet by mouth nightly 30 Tablet 3    furosemide (LASIX) 20 mg tablet Take 1 tablet by mouth once daily 90 Tablet 3    cholecalciferol (VITAMIN D3) (1000 Units /25 mcg) tablet Take 2 Tablets by mouth daily. Buy from any drug store.   Indications: low vitamin D levels 180 Tablet 1    atorvastatin (LIPITOR) 40 mg tablet Take 1 tablet by mouth once daily 90 Tab 3    OTHER CBD oil taking 2 drops twice a day for cervical stenosis      cloNIDine HCL (CATAPRES) 0.1 mg tablet Take 1 tablet by mouth twice daily (Patient taking differently: Take 0.1 mg by mouth two (2) times a day.) 180 Tab 3    aspirin 81 mg chewable tablet Take 1 Tab by mouth daily. 30 Tab 0    acetaminophen (TYLENOL) 650 mg TbER Take 650 mg by mouth as needed. polyethylene glycol (MIRALAX) 17 gram/dose powder Take 17 g by mouth as needed. PHYSICAL EXAM  Visit Vitals  /77 (BP 1 Location: Left upper arm, BP Patient Position: Sitting)   Pulse (!) 58   Temp 97.6 °F (36.4 °C) (Oral)   Resp 12   Ht 5' 4\" (1.626 m)   Wt 151 lb (68.5 kg)   SpO2 95%   BMI 25.92 kg/m²       General: Well-developed and well-nourished, no distress. HEENT:  Head normocephalic/atraumatic, no scleral icterus  Neck: Supple. No lymphadenopathy or thyromegaly. Lungs:  Clear to ausculation bilaterally. Good air movement. Heart:  Bradycardic, regular rhythm, normal S1 and S2, no murmur, gallop, or rub  Extremities: No clubbing, cyanosis, or edema. Arthritic changes at both hands. Neurological: Alert and oriented. Psychiatric: Normal mood and affect. Behavior is normal.         ASSESSMENT AND PLAN    ICD-10-CM ICD-9-CM    1. Medicare annual wellness visit, subsequent  Z00.00 V70.0       2. Acquired hypothyroidism  E03.9 244.9 TSH 3RD GENERATION      TSH 3RD GENERATION      3. Essential hypertension  D11 250.6 METABOLIC PANEL, COMPREHENSIVE      CBC WITH AUTOMATED DIFF      METABOLIC PANEL, COMPREHENSIVE      CBC WITH AUTOMATED DIFF      4. Hypercholesterolemia  E78.00 272.0 LIPID PANEL      LIPID PANEL      5. Mild cognitive impairment  G31.84 331.83       6. Major depressive disorder, single episode, mild (HCC)  F32.0 296.21       7. Vitamin D deficiency  E55.9 268.9 VITAMIN D, 25 HYDROXY      VITAMIN D, 25 HYDROXY      8. Screening for depression  Z13.31 V79.0 [facility]      9.  Needs flu shot  Z23 V04.81 INFLUENZA, FLUAD, (AGE 65 Y+), IM, PF, 0.5 ML        Diagnoses and all orders for this visit:    1. Medicare annual wellness visit, subsequent    2. Acquired hypothyroidism  -     TSH 3RD GENERATION; Future    3. Essential hypertension  -     METABOLIC PANEL, COMPREHENSIVE; Future  -     CBC WITH AUTOMATED DIFF; Future    4. Hypercholesterolemia  -     LIPID PANEL; Future    5. Mild cognitive impairment    6. Major depressive disorder, single episode, mild ([facility] [address]    7. Vitamin D deficiency  -     VITAMIN D, 25 HYDROXY; Future    8. Screening for depression  -     DEPRESSION SCREEN ANNUAL    9. Needs flu shot  -     INFLUENZA, FLUAD, (AGE 72 Y+), IM, PF, 0.5 ML    Her HTN is well-controlled today. Usually has elevated BP readings in clinic due to white coat syndrome. She is concerned that she takes so many medications so we reviewed them today. They all have benefit, and after review, she did not want to stop any. Despite her age, will continue statin due to history of CAD. She received a flu vaccine today. Plan to recheck labs before next appointment. Time Spent: 35 mins on medical record review, history, exam, discussing problems and plan, counseling, and placing orders. Follow-up and Dispositions    Return in about 6 months (around 3/19/2023), or if symptoms worsen or fail to improve, for HTN, thyroid; have fasting labs 1 week before appointment. I have discussed the diagnosis with the patient and the intended plan as seen in the above orders. Patient is in agreement. The patient has received an after-visit summary and questions were answered concerning future plans. I have discussed medication side effects and warnings with the patient as well.

## 2022-09-19 ENCOUNTER — OFFICE VISIT (OUTPATIENT)
Dept: INTERNAL MEDICINE CLINIC | Age: 87
End: 2022-09-19
Payer: MEDICARE

## 2022-09-19 VITALS
HEIGHT: 64 IN | BODY MASS INDEX: 25.78 KG/M2 | RESPIRATION RATE: 12 BRPM | DIASTOLIC BLOOD PRESSURE: 77 MMHG | HEART RATE: 58 BPM | SYSTOLIC BLOOD PRESSURE: 116 MMHG | OXYGEN SATURATION: 95 % | WEIGHT: 151 LBS | TEMPERATURE: 97.6 F

## 2022-09-19 DIAGNOSIS — F32.0 MAJOR DEPRESSIVE DISORDER, SINGLE EPISODE, MILD (HCC): ICD-10-CM

## 2022-09-19 DIAGNOSIS — I10 ESSENTIAL HYPERTENSION: ICD-10-CM

## 2022-09-19 DIAGNOSIS — E55.9 VITAMIN D DEFICIENCY: ICD-10-CM

## 2022-09-19 DIAGNOSIS — E03.9 ACQUIRED HYPOTHYROIDISM: ICD-10-CM

## 2022-09-19 DIAGNOSIS — Z23 NEEDS FLU SHOT: ICD-10-CM

## 2022-09-19 DIAGNOSIS — Z13.31 SCREENING FOR DEPRESSION: ICD-10-CM

## 2022-09-19 DIAGNOSIS — G31.84 MILD COGNITIVE IMPAIRMENT: ICD-10-CM

## 2022-09-19 DIAGNOSIS — E78.00 HYPERCHOLESTEROLEMIA: ICD-10-CM

## 2022-09-19 DIAGNOSIS — Z00.00 MEDICARE ANNUAL WELLNESS VISIT, SUBSEQUENT: Primary | ICD-10-CM

## 2022-09-19 NOTE — ACP (ADVANCE CARE PLANNING)
Advance Care Planning     Advance Care Planning (ACP) Physician/NP/PA Conversation      Date of Conversation: 9/19/2022  Conducted with: Patient with Decision Making Capacity    Healthcare Decision Maker:     Primary Decision Maker: Glenn Grace Child - [phone]    Primary Decision Maker: Jackelin Murray Child - [phone]  Click here to complete [address] including selection of the Healthcare Decision Maker Relationship (ie \"Primary\")      Care Preferences:    Hospitalization: \"If your health worsens and it becomes clear that your chance of recovery is unlikely, what would be your preference regarding hospitalization? \"  The patient would prefer hospitalization. Ventilation: \"If you were unable to breathe on your own and your chance of recovery was unlikely, what would be your preference about the use of a ventilator (breathing machine) if it was available to you? \"   The patient would NOT desire the use of a ventilator. Resuscitation: \"In the event your heart stopped as a result of an underlying serious health condition, would you want attempts to be made to restart your heart, or would you prefer a natural death? \"   Yes, attempt to resuscitate.     Additional topics discussed: treatment goals    Conversation Outcomes / Follow-Up Plan:   ACP complete - no further action today  Reviewed DNR/DNI and patient elects Full Code (Attempt Resuscitation)     Length of Voluntary ACP Conversation in minutes:  <16 minutes (Non-Billable)    Jackson Denver, MD

## 2022-09-19 NOTE — PROGRESS NOTES
King [facility]one  Identified pt with two pt identifiers(name and ). Chief Complaint   Patient presents with    Annual Wellness Visit    Hypertension    Thyroid Problem    Cholesterol Problem       Reviewed record In preparation for visit and have obtained necessary documentation. 1. Have you been to the ER, urgent care clinic or hospitalized since your last visit? No     2. Have you seen or consulted any other health care providers outside of the [address] [place] [address] since your last visit? Include any pap smears or colon screening. No    Vitals reviewed with provider. Health Maintenance reviewed: After verbal order read back of Dr Sylvie Hernandes, patient received High Dose Flu Shot (Adjuvanted Fluad) in left deltoid. [address]ra [address]: [id number] Lot: [id number] Exp: 2023. Patient tolerated procedure without complaints and received VIS.       Health Maintenance Due   Topic    COVID-19 Vaccine (3 - Booster for Moderna series)    Lipid Screen     Flu Vaccine (1)          Wt Readings from Last 3 Encounters:   22 151 lb (68.5 kg)   21 149 lb (67.6 kg)   21 149 lb (67.6 kg)        Temp Readings from Last 3 Encounters:   22 97.6 °F (36.4 °C) (Oral)   21 98.6 °F (37 °C) (Oral)   21 98.1 °F (36.7 °C) (Oral)        BP Readings from Last 3 Encounters:   22 116/77   21 (!) 181/103   21 (!) 171/105        Pulse Readings from Last 3 Encounters:   22 (!) 58   21 71   21 85        Vitals:    22 0806   BP: 116/77   Pulse: (!) 58   Resp: 12   Temp: 97.6 °F (36.4 °C)   TempSrc: Oral   SpO2: 95%   Weight: 151 lb (68.5 kg)   Height: 5' 4\" (1.626 m)   PainSc:   0 - No pain          Learning Assessment:   :       Learning Assessment 2014   PRIMARY LEARNER Patient   HIGHEST LEVEL OF EDUCATION - PRIMARY LEARNER  GRADUATED HIGH SCHOOL OR GED   BARRIERS PRIMARY LEARNER NONE   CO-LEARNER CAREGIVER No   PRIMARY LANGUAGE ENGLISH   LEARNER PREFERENCE PRIMARY DEMONSTRATION   ANSWERED BY patient   RELATIONSHIP SELF        Depression Screening:   :       3 most recent PHQ Screens 9/19/2022   Little interest or pleasure in doing things Not at all   Feeling down, depressed, irritable, or hopeless Not at all   Total Score PHQ 2 0   Trouble falling or staying asleep, or sleeping too much -   Feeling tired or having little energy -   Poor appetite, weight loss, or overeating -   Feeling bad about yourself - or that you are a failure or have let yourself or your family down -   Trouble concentrating on things such as school, work, reading, or watching TV -   Moving or speaking so slowly that other people could have noticed; or the opposite being so fidgety that others notice -   Thoughts of being better off dead, or hurting yourself in some way -   PHQ 9 Score -        Fall Risk Assessment:   :       Fall Risk Assessment, last 12 mths 9/19/2022   Able to walk? Yes   Fall in past 12 months? 0   Do you feel unsteady? 0   Are you worried about falling 0   Number of falls in past 12 months -   Fall with injury? -        Abuse Screening:   :       Abuse Screening Questionnaire 9/19/2022 8/18/2021 3/1/2021 8/12/2020 1/16/2019 4/16/2018 1/19/2017   Do you ever feel afraid of your partner? N N N N N N N   Are you in a relationship with someone who physically or mentally threatens you? N N N N N N N   Is it safe for you to go home?  Y Y Y Y Y Y Y        ADL Screening:   :       ADL Assessment 9/19/2022   Feeding yourself No Help Needed   Getting from bed to chair No Help Needed   Getting dressed No Help Needed   Bathing or showering No Help Needed   Walk across the room (includes cane/walker) No Help Needed   Using the telphone No Help Needed   Taking your medications No Help Needed   Preparing meals No Help Needed   Managing money (expenses/bills) No Help Needed   Moderately strenuous housework (laundry) No Help Needed   Shopping for personal items (toiletries/medicines) No Help Needed Shopping for groceries No Help Needed   Driving No Help Needed   Climbing a flight of stairs No Help Needed   Getting to places beyond walking distances No Help Needed

## 2022-09-27 NOTE — PROGRESS NOTES
1. Have you been to the ER, urgent care clinic since your last visit? Hospitalized since your last visit? No    2. Have you seen or consulted any other health care providers outside of the [address] [place] [address] since your last visit? Include any pap smears or colon screening. No     Chief Complaint   Patient presents with    Coronary Artery Disease     6 mo appt. Denied cardiac symptoms. 6

## 2022-10-25 NOTE — TELEPHONE ENCOUNTER
Pt called (spoke w/ [facility]) stating that she wanted to schedule an appt ASAP with Dr. Stephanie Long to discuss \"thyroid issues\". Called pt back and LVM asking her to call back in to schedule.
49 yo male who denies any PMHx presenting with chest pain and SOB. Symptoms started several days ago. Pain localized to the left chest with radiation to left shoulder and back, has been constant, rated 10/10, sharp. He has not had similar symptoms prior. He also endorses associated SOB worse with exertion, dizziness, blurred vision, palpitations, and abdominal pain. He has not seen a physician in several years due to not having any health issues that he is aware of. He otherwise denies fever, chills, nausea, vomiting, urinary symptoms, LE pain/swelling, recent travel, sick contacts, recent surgeries. has no FH CAD  in ER, ekg, labs wnl, sent to [facility] for stress, TTE, tele

## 2023-03-19 NOTE — PROGRESS NOTES
CC:   Chief Complaint   Patient presents with    Thyroid Problem    Hypertension       HISTORY OF PRESENT ILLNESS  Jessika Tiwari is a 80 y.o. female. Presents for 6 month follow up of HTN and hypothyroidism. She has HTN with CKD stage 2, hypercholesterolemia, CAD s/p 2 stents in 7/18, mild cognitive impairment, hypothyroidism, and chronic low back pain. She forgot to have labs done before this appointment. On Aricept 5 mg daily. No complaints. Denies HA's, CP, SOB, dizziness, or heart palpitations. Compliant with losartan 100 mg daily, metoprolol XL 25 daily, amlodipine 5 mg daily, and clonidine 0.1 mg BID. Also Lasix 20 mg daily for swelling at hands and legs. Gets regular exercise walking 15 mins a day and walking up and down stairs in her home regularly. Thyroid ROS: denies fatigue, weight changes, heat/cold intolerance, bowel/skin changes or CVS symptoms. Taking medication as prescribed. Last TSH was normal (5/10/22: 3.130). Patient Active Problem List   Diagnosis Code    Essential hypertension I10    Spinal stenosis M48.00    Cervical neck pain with evidence of disc disease M50.90    GERD (gastroesophageal reflux disease) K21.9    Advance directive on file Z78.9    Osteopenia with high risk of fracture M85.80    Hypercholesterolemia E78.00    LBBB (left bundle branch block) I44.7    Overweight (BMI 25.0-29. 9) E66.3    Vitamin D deficiency E55.9    Acquired hypothyroidism E03.9    Mild cognitive impairment G31.84    Generalized anxiety disorder F41. 1    White coat syndrome with diagnosis of hypertension I10    Major depressive disorder, single episode, mild (HCC) F32.0     Past Medical History:   Diagnosis Date    CAD (coronary artery disease)     GERD (gastroesophageal reflux disease)     Hypercholesterolemia     Hypertension     Plantar fasciitis 3/1/2010    Rotator cuff rupture 3/12/2013    1/13  MRI shows complete tear of supraspinatus with retraction, other are OK     SOB (shortness of breath) 6/14/2018    Tubular adenoma of colon 8/4/2014 July 2014     Tubular adenoma of rectum 5/19/2019 5/19/19    UTI (urinary tract infection) 7/5/2018     Allergies   Allergen Reactions    Contrast Agent [Iodine] Hives    Penicillins Hives    Bystolic [Nebivolol] Other (comments)     abd pain    Cardizem [Diltiazem Hcl] Rash    Cardura [Doxazosin] Unknown (comments)    Codeine Nausea Only    Cymbalta [Duloxetine] Other (comments)     Abdominal pain, anxiety    Donepezil Other (comments)     Muscle cramps in legs    Gabapentin Other (comments)     Made her feel crazy    Prinivil [Lisinopril] Unknown (comments)    Tekturna [Aliskiren] Other (comments)     abd cramps    Tenormin [Atenolol] Unknown (comments)       Current Outpatient Medications   Medication Sig Dispense Refill    amLODIPine (NORVASC) 5 mg tablet Take 1 tablet by mouth once daily 90 Tablet 1    levothyroxine (SYNTHROID) 100 mcg tablet TAKE 1 TABLET BY MOUTH ONCE DAILY BEFORE BREAKFAST 90 Tablet 1    venlafaxine-SR (EFFEXOR-XR) 37.5 mg capsule Take 1 capsule by mouth once daily 90 Capsule 1    metoprolol succinate (TOPROL-XL) 25 mg XL tablet Take 1 tablet by mouth once daily 90 Tablet 1    donepeziL (ARICEPT) 5 mg tablet Take 1 tablet by mouth nightly 30 Tablet 3    losartan (COZAAR) 100 mg tablet Take 1 tablet by mouth once daily 90 Tablet 3    furosemide (LASIX) 20 mg tablet Take 1 tablet by mouth once daily 90 Tablet 3    cholecalciferol (VITAMIN D3) (1000 Units /25 mcg) tablet Take 2 Tablets by mouth daily. Buy from any drug store. Indications: low vitamin D levels 180 Tablet 1    atorvastatin (LIPITOR) 40 mg tablet Take 1 tablet by mouth once daily 90 Tab 3    cloNIDine HCL (CATAPRES) 0.1 mg tablet Take 1 tablet by mouth twice daily (Patient taking differently: Take 0.1 mg by mouth two (2) times a day.) 180 Tab 3    aspirin 81 mg chewable tablet Take 1 Tab by mouth daily.  30 Tab 0    acetaminophen (TYLENOL) 650 mg TbER Take 650 mg by mouth as needed. polyethylene glycol (MIRALAX) 17 gram/dose powder Take 17 g by mouth as needed. OTHER CBD oil taking 2 drops twice a day for cervical stenosis           PHYSICAL EXAM  Visit Vitals  BP (!) 143/85 (BP 1 Location: Left upper arm, BP Patient Position: Sitting, BP Cuff Size: Adult)   Pulse 72   Temp 97.6 °F (36.4 °C) (Oral)   Resp 18   Ht 5' 4\" (1.626 m)   Wt 154 lb 6.4 oz (70 kg)   SpO2 95%   BMI 26.50 kg/m²       General: Well-developed and well-nourished, no distress. HEENT:  Head normocephalic/atraumatic, no scleral icterus  Neck: Supple. No lymphadenopathy or thyromegaly. Lungs:  Clear to ausculation bilaterally. Good air movement. Heart:  Regular rate and rhythm, normal S1 and S2, no murmur, gallop, or rub  Extremities: No clubbing, cyanosis, or edema. Unchanged arthritic changes at both hands. Neurological: Alert and oriented. Psychiatric: Normal mood and affect. Behavior is normal.         ASSESSMENT AND PLAN    ICD-10-CM ICD-9-CM    1. Essential hypertension  I10 401.9       2. Acquired hypothyroidism  E03.9 244.9       3. Hypercholesterolemia  E78.00 272.0       4. Major depressive disorder, single episode, mild (HCC)  F32.0 296.21         Diagnoses and all orders for this visit:    1. Essential hypertension    2. Acquired hypothyroidism    3. Hypercholesterolemia    4. Major depressive disorder, single episode, mild (HCC)    HTN at goal (<150/90). Continue losartan 100 mg daily, metoprolol XL 25 daily, amlodipine 5 mg daily, and clonidine 0.1 mg BID. Asymptomatic hypothyroidism. Depression well-controlled on  Effexor XR. Lab orders reprinted. Follow-up and Dispositions    Return in about 6 months (around 9/20/2023), or if symptoms worsen or fail to improve, for Medicare AWV; have fasting labs done within 2 weeks. I have discussed the diagnosis with the patient and the intended plan as seen in the above orders. Patient is in agreement.   The patient has received an after-visit summary and questions were answered concerning future plans. I have discussed medication side effects and warnings with the patient as well.

## 2023-03-20 ENCOUNTER — OFFICE VISIT (OUTPATIENT)
Dept: INTERNAL MEDICINE CLINIC | Age: 88
End: 2023-03-20
Payer: MEDICARE

## 2023-03-20 VITALS
TEMPERATURE: 97.6 F | OXYGEN SATURATION: 95 % | HEIGHT: 64 IN | WEIGHT: 154.4 LBS | RESPIRATION RATE: 18 BRPM | SYSTOLIC BLOOD PRESSURE: 143 MMHG | DIASTOLIC BLOOD PRESSURE: 85 MMHG | HEART RATE: 72 BPM | BODY MASS INDEX: 26.36 KG/M2

## 2023-03-20 DIAGNOSIS — F32.0 MAJOR DEPRESSIVE DISORDER, SINGLE EPISODE, MILD (HCC): ICD-10-CM

## 2023-03-20 DIAGNOSIS — E03.9 ACQUIRED HYPOTHYROIDISM: ICD-10-CM

## 2023-03-20 DIAGNOSIS — E78.00 HYPERCHOLESTEROLEMIA: ICD-10-CM

## 2023-03-20 DIAGNOSIS — I10 ESSENTIAL HYPERTENSION: Primary | ICD-10-CM

## 2023-03-20 PROCEDURE — G8417 CALC BMI ABV UP PARAM F/U: HCPCS | Performed by: INTERNAL MEDICINE

## 2023-03-20 PROCEDURE — G8427 DOCREV CUR MEDS BY ELIG CLIN: HCPCS | Performed by: INTERNAL MEDICINE

## 2023-03-20 PROCEDURE — 1090F PRES/ABSN URINE INCON ASSESS: CPT | Performed by: INTERNAL MEDICINE

## 2023-03-20 PROCEDURE — G8536 NO DOC ELDER MAL SCRN: HCPCS | Performed by: INTERNAL MEDICINE

## 2023-03-20 PROCEDURE — 99214 OFFICE O/P EST MOD 30 MIN: CPT | Performed by: INTERNAL MEDICINE

## 2023-03-20 PROCEDURE — 1123F ACP DISCUSS/DSCN MKR DOCD: CPT | Performed by: INTERNAL MEDICINE

## 2023-03-20 PROCEDURE — 1101F PT FALLS ASSESS-DOCD LE1/YR: CPT | Performed by: INTERNAL MEDICINE

## 2023-03-20 PROCEDURE — G9717 DOC PT DX DEP/BP F/U NT REQ: HCPCS | Performed by: INTERNAL MEDICINE

## 2023-03-20 NOTE — PROGRESS NOTES
Honey Bacon  Identified pt with two pt identifiers(name and ). Chief Complaint   Patient presents with    Thyroid Problem    Hypertension       Reviewed record In preparation for visit and have obtained necessary documentation. 1. Have you been to the ER, urgent care clinic or hospitalized since your last visit? No     2. Have you seen or consulted any other health care providers outside of the [address] [place] [address] since your last visit? Include any pap smears or colon screening. No    Vitals reviewed with provider.     Health Maintenance reviewed:     Health Maintenance Due   Topic    COVID-19 Vaccine (3 - Booster for Moderna series)    Lipid Screen           Wt Readings from Last 3 Encounters:   23 154 lb 6.4 oz (70 kg)   22 151 lb (68.5 kg)   21 149 lb (67.6 kg)        Temp Readings from Last 3 Encounters:   23 97.6 °F (36.4 °C) (Oral)   22 97.6 °F (36.4 °C) (Oral)   21 98.6 °F (37 °C) (Oral)        BP Readings from Last 3 Encounters:   23 (!) 143/85   22 116/77   21 (!) 181/103        Pulse Readings from Last 3 Encounters:   23 72   22 (!) 58   21 71        Vitals:    23 0828 23 0832   BP: (!) 146/92 (!) 143/85   Pulse: 72    Resp: 18    Temp: 97.6 °F (36.4 °C)    TempSrc: Oral    SpO2: 95%    Weight: 154 lb 6.4 oz (70 kg)    Height: 5' 4\" (1.626 m)    PainSc:   0 - No pain           Learning Assessment:   :       Learning Assessment 2014   PRIMARY LEARNER Patient   HIGHEST LEVEL OF EDUCATION - PRIMARY LEARNER  GRADUATED HIGH SCHOOL OR GED   BARRIERS PRIMARY LEARNER NONE   CO-LEARNER CAREGIVER No   PRIMARY LANGUAGE ENGLISH   LEARNER PREFERENCE PRIMARY DEMONSTRATION   ANSWERED BY patient   RELATIONSHIP SELF        Depression Screening:   :       3 most recent PHQ Screens 3/20/2023   Little interest or pleasure in doing things Not at all   Feeling down, depressed, irritable, or hopeless Not at all   Total Score PHQ 2 0   Trouble falling or staying asleep, or sleeping too much -   Feeling tired or having little energy -   Poor appetite, weight loss, or overeating -   Feeling bad about yourself - or that you are a failure or have let yourself or your family down -   Trouble concentrating on things such as school, work, reading, or watching TV -   Moving or speaking so slowly that other people could have noticed; or the opposite being so fidgety that others notice -   Thoughts of being better off dead, or hurting yourself in some way -   PHQ 9 Score -        Fall Risk Assessment:   :       Fall Risk Assessment, last 12 mths 9/19/2022   Able to walk? Yes   Fall in past 12 months? 0   Do you feel unsteady? 0   Are you worried about falling 0   Number of falls in past 12 months -   Fall with injury? -        Abuse Screening:   :       Abuse Screening Questionnaire 9/19/2022 8/18/2021 3/1/2021 8/12/2020 1/16/2019 4/16/2018 1/19/2017   Do you ever feel afraid of your partner? N N N N N N N   Are you in a relationship with someone who physically or mentally threatens you? N N N N N N N   Is it safe for you to go home?  Y Y Y Y Y Y Y        ADL Screening:   :       ADL Assessment 9/19/2022   Feeding yourself No Help Needed   Getting from bed to chair No Help Needed   Getting dressed No Help Needed   Bathing or showering No Help Needed   Walk across the room (includes cane/walker) No Help Needed   Using the telphone No Help Needed   Taking your medications No Help Needed   Preparing meals No Help Needed   Managing money (expenses/bills) No Help Needed   Moderately strenuous housework (laundry) No Help Needed   Shopping for personal items (toiletries/medicines) No Help Needed   Shopping for groceries No Help Needed   Driving No Help Needed   Climbing a flight of stairs No Help Needed   Getting to places beyond walking distances No Help Needed

## 2023-08-02 NOTE — PROGRESS NOTES
Chao Ortiz  Identified pt with two pt identifiers(name and ). Chief Complaint   Patient presents with    Blood Pressure Check    Cholesterol Problem    Coronary Artery Disease    Immunization/Injection       Reviewed record In preparation for visit and have obtained necessary documentation. Advanced directive / living will on file. 1. Have you been to the ER, urgent care clinic or hospitalized since your last visit? No     2. Have you seen or consulted any other health care providers outside of the [address] [place] [address] since your last visit? Include any pap smears or colon screening. Cardiac rehab    Vitals reviewed with provider. Health Maintenance reviewed: After verbal order read back of , patient received High Dose Flu Shot (Adjuvanted Fluad) in left arm. [address] [address]61 Lot [id number] Exp 19 Patient tolerated procedure without complaints and received VIS.       Health Maintenance Due   Topic    Shingrix Vaccine Age 49> (1 of 2)    Influenza Age 5 to Adult           Wt Readings from Last 3 Encounters:   18 163 lb (73.9 kg)   18 159 lb 12.8 oz (72.5 kg)   18 160 lb 2 oz (72.6 kg)        Temp Readings from Last 3 Encounters:   18 97.6 °F (36.4 °C) (Oral)   18 98.4 °F (36.9 °C) (Oral)   18 97.1 °F (36.2 °C)        BP Readings from Last 3 Encounters:   18 (!) 170/98   18 122/78   18 132/74        Pulse Readings from Last 3 Encounters:   18 (!) 57   18 64   18 (!) 49        Vitals:    18 1031   BP: (!) 170/98   Pulse: (!) 57   Resp: 14   Temp: 97.6 °F (36.4 °C)   TempSrc: Oral   SpO2: 97%   Weight: 163 lb (73.9 kg)   Height: 5' 4\" (1.626 m)   PainSc:   7   PainLoc: Neck          Learning Assessment:   :       Learning Assessment 2014   PRIMARY LEARNER Patient   HIGHEST LEVEL OF EDUCATION - PRIMARY LEARNER  GRADUATED HIGH SCHOOL OR GED   BARRIERS PRIMARY LEARNER NONE   CO-LEARNER CAREGIVER No PRIMARY LANGUAGE ENGLISH   LEARNER PREFERENCE PRIMARY DEMONSTRATION   ANSWERED BY patient   RELATIONSHIP SELF        Depression Screening:   :       PHQ over the last two weeks 9/4/2018   Little interest or pleasure in doing things Not at all   Feeling down, depressed, irritable, or hopeless Not at all   Total Score PHQ 2 0   Trouble falling or staying asleep, or sleeping too much Not at all   Feeling tired or having little energy Nearly every day   Poor appetite, weight loss, or overeating Not at all   Feeling bad about yourself - or that you are a failure or have let yourself or your family down Not at all   Trouble concentrating on things such as school, work, reading, or watching TV Not at all   Moving or speaking so slowly that other people could have noticed; or the opposite being so fidgety that others notice Not at all   Thoughts of being better off dead, or hurting yourself in some way Not at all   PHQ 9 Score 3        Fall Risk Assessment:   :       Fall Risk Assessment, last 12 mths 4/16/2018   Able to walk? Yes   Fall in past 12 months? No        Abuse Screening:   :       Abuse Screening Questionnaire 4/16/2018 1/19/2017 10/26/2015 8/5/2014   Do you ever feel afraid of your partner? N N N N   Are you in a relationship with someone who physically or mentally threatens you? N N N N   Is it safe for you to go home?  Y Y Y Y        ADL Screening:   :       ADL Assessment 4/3/2015   Feeding yourself No Help Needed   Getting from bed to chair No Help Needed   Getting dressed No Help Needed   Bathing or showering No Help Needed   Walk across the room (includes cane/walker) No Help Needed   Using the telphone No Help Needed   Taking your medications No Help Needed   Preparing meals No Help Needed   Managing money (expenses/bills) No Help Needed   Moderately strenuous housework (laundry) No Help Needed   Shopping for personal items (toiletries/medicines) No Help Needed   Shopping for groceries No Help Needed Driving No Help Needed   Climbing a flight of stairs No Help Needed   Getting to places beyond walking distances No Help Needed Suturegard Intro: Intraoperative tissue expansion was performed, utilizing the SUTUREGARD device, in order to reduce wound tension.

## 2023-08-29 RX ORDER — ATORVASTATIN CALCIUM 40 MG/1
40 TABLET, FILM COATED ORAL DAILY
Qty: 90 TABLET | Refills: 1 | Status: SHIPPED | OUTPATIENT
Start: 2023-08-29

## 2023-08-29 RX ORDER — FUROSEMIDE 20 MG/1
20 TABLET ORAL DAILY
Qty: 90 TABLET | Refills: 1 | Status: SHIPPED | OUTPATIENT
Start: 2023-08-29

## 2023-08-29 NOTE — TELEPHONE ENCOUNTER
atorvastatin (LIPITOR) 40 MG tablet  furosemide (LASIX) 20 MG tablet    [facility] at [address] [facility]

## 2023-08-29 NOTE — TELEPHONE ENCOUNTER
PCP: Talia Cabrera MD     Last appt:  3/20/2023      Future Appointments   Date Time Provider [address]   9/20/2023  9:10 AM Talia Cabrera MD [facility] AMB          Requested Prescriptions     Pending Prescriptions Disp Refills    atorvastatin (LIPITOR) 40 MG tablet 90 tablet 1     Sig: Take 1 tablet by mouth daily    furosemide (LASIX) 20 MG tablet 90 tablet 1     Sig: Take 1 tablet by mouth daily

## 2023-09-20 ENCOUNTER — OFFICE VISIT (OUTPATIENT)
Facility: CLINIC | Age: 88
End: 2023-09-20
Payer: COMMERCIAL

## 2023-09-20 VITALS
SYSTOLIC BLOOD PRESSURE: 178 MMHG | TEMPERATURE: 97.7 F | RESPIRATION RATE: 16 BRPM | OXYGEN SATURATION: 95 % | WEIGHT: 151 LBS | HEIGHT: 64 IN | HEART RATE: 71 BPM | BODY MASS INDEX: 25.78 KG/M2 | DIASTOLIC BLOOD PRESSURE: 117 MMHG

## 2023-09-20 DIAGNOSIS — Z23 NEEDS FLU SHOT: ICD-10-CM

## 2023-09-20 DIAGNOSIS — I10 ESSENTIAL HYPERTENSION: ICD-10-CM

## 2023-09-20 DIAGNOSIS — F32.0 MAJOR DEPRESSIVE DISORDER, SINGLE EPISODE, MILD (HCC): ICD-10-CM

## 2023-09-20 DIAGNOSIS — Z00.00 MEDICARE ANNUAL WELLNESS VISIT, SUBSEQUENT: Primary | ICD-10-CM

## 2023-09-20 PROCEDURE — 99213 OFFICE O/P EST LOW 20 MIN: CPT | Performed by: INTERNAL MEDICINE

## 2023-09-20 PROCEDURE — G0439 PPPS, SUBSEQ VISIT: HCPCS | Performed by: INTERNAL MEDICINE

## 2023-09-20 PROCEDURE — 90694 VACC AIIV4 NO PRSRV 0.5ML IM: CPT | Performed by: INTERNAL MEDICINE

## 2023-09-20 PROCEDURE — 90471 IMMUNIZATION ADMIN: CPT | Performed by: INTERNAL MEDICINE

## 2023-09-20 PROCEDURE — 1123F ACP DISCUSS/DSCN MKR DOCD: CPT | Performed by: INTERNAL MEDICINE

## 2023-09-20 SDOH — ECONOMIC STABILITY: INCOME INSECURITY: HOW HARD IS IT FOR YOU TO PAY FOR THE VERY BASICS LIKE FOOD, HOUSING, MEDICAL CARE, AND HEATING?: NOT HARD AT ALL

## 2023-09-20 SDOH — ECONOMIC STABILITY: FOOD INSECURITY: WITHIN THE PAST 12 MONTHS, THE FOOD YOU BOUGHT JUST DIDN'T LAST AND YOU DIDN'T HAVE MONEY TO GET MORE.: NEVER TRUE

## 2023-09-20 SDOH — ECONOMIC STABILITY: FOOD INSECURITY: WITHIN THE PAST 12 MONTHS, YOU WORRIED THAT YOUR FOOD WOULD RUN OUT BEFORE YOU GOT MONEY TO BUY MORE.: NEVER TRUE

## 2023-09-20 SDOH — ECONOMIC STABILITY: HOUSING INSECURITY
IN THE LAST 12 MONTHS, WAS THERE A TIME WHEN YOU DID NOT HAVE A STEADY PLACE TO SLEEP OR SLEPT IN A SHELTER (INCLUDING NOW)?: NO

## 2023-09-20 ASSESSMENT — PATIENT HEALTH QUESTIONNAIRE - PHQ9
SUM OF ALL RESPONSES TO PHQ9 QUESTIONS 1 & 2: 0
SUM OF ALL RESPONSES TO PHQ QUESTIONS 1-9: 0
9. THOUGHTS THAT YOU WOULD BE BETTER OFF DEAD, OR OF HURTING YOURSELF: 0
SUM OF ALL RESPONSES TO PHQ QUESTIONS 1-9: 0
10. IF YOU CHECKED OFF ANY PROBLEMS, HOW DIFFICULT HAVE THESE PROBLEMS MADE IT FOR YOU TO DO YOUR WORK, TAKE CARE OF THINGS AT HOME, OR GET ALONG WITH OTHER PEOPLE: 0
SUM OF ALL RESPONSES TO PHQ QUESTIONS 1-9: 0
7. TROUBLE CONCENTRATING ON THINGS, SUCH AS READING THE NEWSPAPER OR WATCHING TELEVISION: 0
4. FEELING TIRED OR HAVING LITTLE ENERGY: 0
2. FEELING DOWN, DEPRESSED OR HOPELESS: 0
1. LITTLE INTEREST OR PLEASURE IN DOING THINGS: 0
5. POOR APPETITE OR OVEREATING: 0
SUM OF ALL RESPONSES TO PHQ QUESTIONS 1-9: 0
8. MOVING OR SPEAKING SO SLOWLY THAT OTHER PEOPLE COULD HAVE NOTICED. OR THE OPPOSITE, BEING SO FIGETY OR RESTLESS THAT YOU HAVE BEEN MOVING AROUND A LOT MORE THAN USUAL: 0
6. FEELING BAD ABOUT YOURSELF - OR THAT YOU ARE A FAILURE OR HAVE LET YOURSELF OR YOUR FAMILY DOWN: 0
3. TROUBLE FALLING OR STAYING ASLEEP: 0

## 2023-09-20 ASSESSMENT — LIFESTYLE VARIABLES
HOW MANY STANDARD DRINKS CONTAINING ALCOHOL DO YOU HAVE ON A TYPICAL DAY: PATIENT DOES NOT DRINK
HOW OFTEN DO YOU HAVE A DRINK CONTAINING ALCOHOL: NEVER

## 2023-09-20 NOTE — PATIENT INSTRUCTIONS
[place] on Aging online. You need 2662-3869 mg of calcium and 8716-3801 IU of vitamin D per day. It is possible to meet your calcium requirement with diet alone, but a vitamin D supplement is usually necessary to meet this goal.  When exposed to the sun, use a sunscreen that protects against both UVA and UVB radiation with an SPF of 30 or greater. Reapply every 2 to 3 hours or after sweating, drying off with a towel, or swimming. Always wear a seat belt when traveling in a car. Always wear a helmet when riding a bicycle or motorcycle.

## 2023-12-04 RX ORDER — DONEPEZIL HYDROCHLORIDE 5 MG/1
5 TABLET, FILM COATED ORAL NIGHTLY
Qty: 30 TABLET | Refills: 0 | Status: SHIPPED | OUTPATIENT
Start: 2023-12-04

## 2024-01-04 ENCOUNTER — OFFICE VISIT (OUTPATIENT)
Facility: CLINIC | Age: 89
End: 2024-01-04
Payer: COMMERCIAL

## 2024-01-04 VITALS
OXYGEN SATURATION: 94 % | HEIGHT: 64 IN | HEART RATE: 90 BPM | RESPIRATION RATE: 16 BRPM | TEMPERATURE: 97.6 F | WEIGHT: 152.2 LBS | SYSTOLIC BLOOD PRESSURE: 162 MMHG | DIASTOLIC BLOOD PRESSURE: 98 MMHG | BODY MASS INDEX: 25.99 KG/M2

## 2024-01-04 DIAGNOSIS — F41.1 GENERALIZED ANXIETY DISORDER: ICD-10-CM

## 2024-01-04 DIAGNOSIS — G31.84 MILD COGNITIVE IMPAIRMENT: ICD-10-CM

## 2024-01-04 DIAGNOSIS — I10 ESSENTIAL HYPERTENSION: Primary | ICD-10-CM

## 2024-01-04 DIAGNOSIS — E03.9 ACQUIRED HYPOTHYROIDISM: ICD-10-CM

## 2024-01-04 DIAGNOSIS — I25.10 CORONARY ARTERY DISEASE INVOLVING NATIVE CORONARY ARTERY OF NATIVE HEART WITHOUT ANGINA PECTORIS: ICD-10-CM

## 2024-01-04 DIAGNOSIS — E78.00 HYPERCHOLESTEROLEMIA: ICD-10-CM

## 2024-01-04 DIAGNOSIS — F32.0 MAJOR DEPRESSIVE DISORDER, SINGLE EPISODE, MILD (HCC): ICD-10-CM

## 2024-01-04 PROCEDURE — 99215 OFFICE O/P EST HI 40 MIN: CPT | Performed by: INTERNAL MEDICINE

## 2024-01-04 PROCEDURE — 1123F ACP DISCUSS/DSCN MKR DOCD: CPT | Performed by: INTERNAL MEDICINE

## 2024-01-04 RX ORDER — AMLODIPINE BESYLATE 5 MG/1
5 TABLET ORAL DAILY
Qty: 90 TABLET | Refills: 3 | Status: SHIPPED | OUTPATIENT
Start: 2024-01-04

## 2024-01-04 RX ORDER — CLONIDINE HYDROCHLORIDE 0.1 MG/1
0.1 TABLET ORAL 2 TIMES DAILY
Qty: 180 TABLET | Refills: 3 | Status: SHIPPED | OUTPATIENT
Start: 2024-01-04

## 2024-01-04 RX ORDER — ATORVASTATIN CALCIUM 40 MG/1
40 TABLET, FILM COATED ORAL DAILY
Qty: 90 TABLET | Refills: 3 | Status: SHIPPED | OUTPATIENT
Start: 2024-01-04

## 2024-01-04 RX ORDER — LOSARTAN POTASSIUM 100 MG/1
100 TABLET ORAL DAILY
Qty: 90 TABLET | Refills: 3 | Status: SHIPPED | OUTPATIENT
Start: 2024-01-04

## 2024-01-04 RX ORDER — LEVOTHYROXINE SODIUM 0.1 MG/1
100 TABLET ORAL
Qty: 90 TABLET | Refills: 3 | Status: SHIPPED | OUTPATIENT
Start: 2024-01-04

## 2024-01-04 RX ORDER — VENLAFAXINE HYDROCHLORIDE 75 MG/1
75 CAPSULE, EXTENDED RELEASE ORAL DAILY
Qty: 90 CAPSULE | Refills: 3 | Status: SHIPPED | OUTPATIENT
Start: 2024-01-04

## 2024-01-04 RX ORDER — DONEPEZIL HYDROCHLORIDE 10 MG/1
10 TABLET, FILM COATED ORAL NIGHTLY
Qty: 90 TABLET | Refills: 3 | Status: SHIPPED | OUTPATIENT
Start: 2024-01-04

## 2024-01-04 RX ORDER — METOPROLOL SUCCINATE 25 MG/1
25 TABLET, EXTENDED RELEASE ORAL DAILY
Qty: 90 TABLET | Refills: 3 | Status: SHIPPED | OUTPATIENT
Start: 2024-01-04

## 2024-01-04 ASSESSMENT — PATIENT HEALTH QUESTIONNAIRE - PHQ9
1. LITTLE INTEREST OR PLEASURE IN DOING THINGS: 0
6. FEELING BAD ABOUT YOURSELF - OR THAT YOU ARE A FAILURE OR HAVE LET YOURSELF OR YOUR FAMILY DOWN: 0
5. POOR APPETITE OR OVEREATING: 0
8. MOVING OR SPEAKING SO SLOWLY THAT OTHER PEOPLE COULD HAVE NOTICED. OR THE OPPOSITE, BEING SO FIGETY OR RESTLESS THAT YOU HAVE BEEN MOVING AROUND A LOT MORE THAN USUAL: 0
SUM OF ALL RESPONSES TO PHQ QUESTIONS 1-9: 0
2. FEELING DOWN, DEPRESSED OR HOPELESS: 0
10. IF YOU CHECKED OFF ANY PROBLEMS, HOW DIFFICULT HAVE THESE PROBLEMS MADE IT FOR YOU TO DO YOUR WORK, TAKE CARE OF THINGS AT HOME, OR GET ALONG WITH OTHER PEOPLE: 0
4. FEELING TIRED OR HAVING LITTLE ENERGY: 0
SUM OF ALL RESPONSES TO PHQ QUESTIONS 1-9: 0
7. TROUBLE CONCENTRATING ON THINGS, SUCH AS READING THE NEWSPAPER OR WATCHING TELEVISION: 0
3. TROUBLE FALLING OR STAYING ASLEEP: 0
SUM OF ALL RESPONSES TO PHQ9 QUESTIONS 1 & 2: 0
SUM OF ALL RESPONSES TO PHQ QUESTIONS 1-9: 0
SUM OF ALL RESPONSES TO PHQ QUESTIONS 1-9: 0
9. THOUGHTS THAT YOU WOULD BE BETTER OFF DEAD, OR OF HURTING YOURSELF: 0

## 2024-01-04 NOTE — PROGRESS NOTES
Fall with injury in past year? no         Fall in past 12 months?  0 0 0 0 0 0   Able to walk?  Yes Yes Yes Yes Yes Yes       Abuse Screening:  :         1/4/2024     8:00 AM   AMB Abuse Screening   Do you ever feel afraid of your partner? N   Are you in a relationship with someone who physically or mentally threatens you? N   Is it safe for you to go home? Y       ADL Screening:  :         1/4/2024     8:00 AM   ADL ASSESSMENT   Feeding yourself No Help Needed   Getting from bed to chair No Help Needed   Getting dressed No Help Needed   Bathing or showering No Help Needed   Walk across the room (includes cane/walker) No Help Needed   Using the telphone No Help Needed   Taking your medications No Help Needed   Preparing meals Help Needed   Managing money (expenses/bills) Help Needed   Moderately strenuous housework (laundry) No Help Needed   Shopping for personal items (toiletries/medicines) No Help Needed   Shopping for groceries No Help Needed   Driving Help Needed   Climbing a flight of stairs No Help Needed   Getting to places beyond walking distances No Help Needed         Medication reconciliation up to date and corrected with patient at this time.       
and well-nourished, no distress.  HEENT:  Head normocephalic/atraumatic, no scleral icterus  Neck: Supple. No carotid bruits, JVD, lymphadenopathy, or thyromegaly.  Lungs:  Clear to auscultation bilaterally. Good air movement.  Heart:  Regular rate and rhythm, normal S1 and S2, no murmur, gallop, or rub  Extremities: No clubbing, cyanosis, or edema. 2+ pedal pulses bilaterally.  Neurological: Alert and oriented x 2.   Psychiatric: Normal mood and affect. Behavior is normal.           ASSESSMENT AND PLAN      ICD-10-CM    1. Essential hypertension  I10 cloNIDine (CATAPRES) 0.1 MG tablet     losartan (COZAAR) 100 MG tablet     amLODIPine (NORVASC) 5 MG tablet     metoprolol succinate (TOPROL XL) 25 MG extended release tablet      2. Mild cognitive impairment  G31.84 donepezil (ARICEPT) 10 MG tablet      3. Generalized anxiety disorder  F41.1 venlafaxine (EFFEXOR XR) 75 MG extended release capsule      4. Major depressive disorder, single episode, mild (HCC)  F32.0 venlafaxine (EFFEXOR XR) 75 MG extended release capsule      5. Coronary artery disease involving native coronary artery of native heart without angina pectoris  I25.10       6. Hypercholesterolemia  E78.00 atorvastatin (LIPITOR) 40 MG tablet      7. Acquired hypothyroidism  E03.9 levothyroxine (SYNTHROID) 100 MCG tablet         - Uncontrolled HTN. Instructed her to check BP at home 2-3 times a week and bring record to next appointment.  - Increase dose of Effexor to 75 mg daily for increased anxiety.  - Has worsening cognition and new paranoid behavior. Increase dose of Aricept to 10 mg daily. I requested daughter supervise her filling of pillboxes.  - Refills will be sent on all prescription medications.    - cloNIDine (CATAPRES) 0.1 MG tablet; Take 1 tablet by mouth 2 times daily For high blood pressure  Dispense: 180 tablet; Refill: 3  - losartan (COZAAR) 100 MG tablet; Take 1 tablet by mouth daily For high blood pressure  Dispense: 90 tablet; Refill:

## 2024-08-25 NOTE — TELEPHONE ENCOUNTER
It is supposed to one a day. I did not change it or send a new prescription. The last one I ordered was in April. Must be a pharmacy error. no

## 2025-02-26 ENCOUNTER — OFFICE VISIT (OUTPATIENT)
Facility: CLINIC | Age: 89
End: 2025-02-26

## 2025-02-26 VITALS
OXYGEN SATURATION: 100 % | TEMPERATURE: 98.2 F | RESPIRATION RATE: 16 BRPM | HEIGHT: 64 IN | HEART RATE: 72 BPM | DIASTOLIC BLOOD PRESSURE: 89 MMHG | WEIGHT: 147 LBS | SYSTOLIC BLOOD PRESSURE: 130 MMHG | BODY MASS INDEX: 25.1 KG/M2

## 2025-02-26 DIAGNOSIS — F03.A0 MILD DEMENTIA WITHOUT BEHAVIORAL DISTURBANCE, PSYCHOTIC DISTURBANCE, MOOD DISTURBANCE, OR ANXIETY, UNSPECIFIED DEMENTIA TYPE (HCC): ICD-10-CM

## 2025-02-26 DIAGNOSIS — Z00.00 MEDICARE ANNUAL WELLNESS VISIT, SUBSEQUENT: Primary | ICD-10-CM

## 2025-02-26 DIAGNOSIS — E03.9 ACQUIRED HYPOTHYROIDISM: ICD-10-CM

## 2025-02-26 DIAGNOSIS — I10 ESSENTIAL HYPERTENSION: ICD-10-CM

## 2025-02-26 DIAGNOSIS — I25.10 CORONARY ARTERY DISEASE INVOLVING NATIVE CORONARY ARTERY OF NATIVE HEART WITHOUT ANGINA PECTORIS: ICD-10-CM

## 2025-02-26 DIAGNOSIS — E78.00 HYPERCHOLESTEROLEMIA: ICD-10-CM

## 2025-02-26 SDOH — ECONOMIC STABILITY: FOOD INSECURITY: WITHIN THE PAST 12 MONTHS, THE FOOD YOU BOUGHT JUST DIDN'T LAST AND YOU DIDN'T HAVE MONEY TO GET MORE.: NEVER TRUE

## 2025-02-26 SDOH — ECONOMIC STABILITY: FOOD INSECURITY: WITHIN THE PAST 12 MONTHS, YOU WORRIED THAT YOUR FOOD WOULD RUN OUT BEFORE YOU GOT MONEY TO BUY MORE.: NEVER TRUE

## 2025-02-26 ASSESSMENT — PATIENT HEALTH QUESTIONNAIRE - PHQ9
6. FEELING BAD ABOUT YOURSELF - OR THAT YOU ARE A FAILURE OR HAVE LET YOURSELF OR YOUR FAMILY DOWN: NOT AT ALL
5. POOR APPETITE OR OVEREATING: NOT AT ALL
1. LITTLE INTEREST OR PLEASURE IN DOING THINGS: NOT AT ALL
9. THOUGHTS THAT YOU WOULD BE BETTER OFF DEAD, OR OF HURTING YOURSELF: NOT AT ALL
7. TROUBLE CONCENTRATING ON THINGS, SUCH AS READING THE NEWSPAPER OR WATCHING TELEVISION: NOT AT ALL
4. FEELING TIRED OR HAVING LITTLE ENERGY: NOT AT ALL
3. TROUBLE FALLING OR STAYING ASLEEP: NOT AT ALL
10. IF YOU CHECKED OFF ANY PROBLEMS, HOW DIFFICULT HAVE THESE PROBLEMS MADE IT FOR YOU TO DO YOUR WORK, TAKE CARE OF THINGS AT HOME, OR GET ALONG WITH OTHER PEOPLE: NOT DIFFICULT AT ALL
8. MOVING OR SPEAKING SO SLOWLY THAT OTHER PEOPLE COULD HAVE NOTICED. OR THE OPPOSITE, BEING SO FIGETY OR RESTLESS THAT YOU HAVE BEEN MOVING AROUND A LOT MORE THAN USUAL: NOT AT ALL
SUM OF ALL RESPONSES TO PHQ QUESTIONS 1-9: 0
SUM OF ALL RESPONSES TO PHQ9 QUESTIONS 1 & 2: 0
SUM OF ALL RESPONSES TO PHQ QUESTIONS 1-9: 0
SUM OF ALL RESPONSES TO PHQ QUESTIONS 1-9: 0
2. FEELING DOWN, DEPRESSED OR HOPELESS: NOT AT ALL
SUM OF ALL RESPONSES TO PHQ QUESTIONS 1-9: 0

## 2025-02-26 ASSESSMENT — LIFESTYLE VARIABLES
HOW OFTEN DO YOU HAVE A DRINK CONTAINING ALCOHOL: NEVER
HOW MANY STANDARD DRINKS CONTAINING ALCOHOL DO YOU HAVE ON A TYPICAL DAY: PATIENT DOES NOT DRINK

## 2025-02-26 NOTE — PROGRESS NOTES
Arin Roth  Identified pt with two pt identifiers(name and ).  Chief Complaint   Patient presents with    Medicare AWV       1. Have you been to the ER, urgent care clinic since your last visit?  Hospitalized since your last visit? NO    2. Have you seen or consulted any other health care providers outside of the [facility] since your last visit?  Include any pap smears or colon screening. NO      Provider notified of reason for visit, vitals and flowsheets obtained on patients.     Patient received paperwork for advance directive during previous visit but has not completed at this time     Reviewed record In preparation for visit, huddled with provider and have obtained necessary documentation      Health Maintenance Due   Topic    Lipids     Annual Wellness Visit (Medicare Advantage)        Wt Readings from Last 3 Encounters:   25 66.7 kg (147 lb)   24 69 kg (152 lb 3.2 oz)   23 68.5 kg (151 lb)     Temp Readings from Last 3 Encounters:   25 98.2 °F (36.8 °C) (Oral)   24 97.6 °F (36.4 °C) (Oral)   23 97.7 °F (36.5 °C) (Oral)     BP Readings from Last 3 Encounters:   25 (!) 130/90   24 (!) 162/98   23 (!) 178/117     Pulse Readings from Last 3 Encounters:   25 72   24 90   23 71          No data to display                  Learning Assessment:  :         2024     8:10 AM   [facility] AMB LEARNING ASSESSMENT   Primary Learner Patient   level of education GRADUATED HIGH SCHOOL OR GED   Primary Language ENGLISH   Learning Preference DEMONSTRATION   Answered By Patient   Relationship to Learner SELF       Fall Risk Assessment:  :         2025    10:34 AM 2023     9:17 AM 2022     8:08 AM 2021    11:20 AM 2021    10:57 AM 6/15/2021    11:10 AM 2021    10:14 AM   Amb Fall Risk Assessment and TUG Test   Do you feel unsteady or are you worried about falling?  no no        2 or more falls in past

## 2025-02-26 NOTE — PROGRESS NOTES
Medicare Annual Wellness Visit    Arin Roth is here for Medicare AWV    Assessment & Plan    ICD-10-CM    1. Medicare annual wellness visit, subsequent  Z00.00 Vitamin D 25 Hydroxy      2. Mild dementia without behavioral disturbance, psychotic disturbance, mood disturbance, or anxiety, unspecified dementia type (HCC)  F03.A0 [facility]     PET BRAIN AMYLOID IMAGING     TSH + Free T4 Panel     Vitamin B12 & Folate      3. Essential hypertension  I10 CBC with Auto Differential     Comprehensive Metabolic Panel      4. Acquired hypothyroidism  E03.9 TSH + Free T4 Panel      5. Hypercholesterolemia  E78.00 Lipid Panel      6. Coronary artery disease involving native coronary artery of native heart without angina pectoris  I25.10           1. Medicare annual wellness visit: Suboptimal memory test results indicating potential cognitive decline.  - Perform a comprehensive memory test.  - Educate on the importance of fasting labs and reschedule.    2. Mild dementia: MMSE score 19, consistent with mild dementia. Referral to Neurology. Will order PET scan of brain to assess for Alzheimer's dementia.  - Continue Aricept 10 mg daily.  - [facility], [place]  - PET BRAIN AMYLOID IMAGING; Future  - TSH + Free T4 Panel  - Vitamin B12 & Folate    3. Hypertension: Borderline high at 130/89 today.  - Continue current medications: amlodipine, clonidine, losartan, and metoprolol.  - Monitor blood pressure regularly.  - CBC with Auto Differential  - Comprehensive Metabolic Panel    4. Hypothyroidism: Asymptomatic.   - Continue levothyroxine 100 mcg daily.  - TSH + Free T4 Panel    5. CAD: Stable  - Continue metoprolol, ASA, and statin.  - Lipid Panel    6. Health maintenance.  - Continue annual eye exams.  - Vitamin D 25 Hydroxy    Non-fasting labs today.       Return in about 4 months (around 6/26/2025), or if symptoms worsen or fail to improve, for HTN, dementia.

## 2025-02-27 LAB
25(OH)D3 SERPL-MCNC: 42.4 NG/ML (ref 30–100)
ALBUMIN SERPL-MCNC: 3.8 G/DL (ref 3.5–5)
ALBUMIN/GLOB SERPL: 1.1 (ref 1.1–2.2)
ALP SERPL-CCNC: 102 U/L (ref 45–117)
ALT SERPL-CCNC: 21 U/L (ref 12–78)
ANION GAP SERPL CALC-SCNC: 4 MMOL/L (ref 2–12)
AST SERPL-CCNC: 23 U/L (ref 15–37)
BASOPHILS # BLD: 0.07 K/UL (ref 0–0.1)
BASOPHILS NFR BLD: 1.4 % (ref 0–1)
BILIRUB SERPL-MCNC: 0.5 MG/DL (ref 0.2–1)
BUN SERPL-MCNC: 14 MG/DL (ref 6–20)
BUN/CREAT SERPL: 15 (ref 12–20)
CALCIUM SERPL-MCNC: 9.9 MG/DL (ref 8.5–10.1)
CHLORIDE SERPL-SCNC: 107 MMOL/L (ref 97–108)
CHOLEST SERPL-MCNC: 167 MG/DL
CO2 SERPL-SCNC: 27 MMOL/L (ref 21–32)
CREAT SERPL-MCNC: 0.95 MG/DL (ref 0.55–1.02)
DIFFERENTIAL METHOD BLD: ABNORMAL
EOSINOPHIL # BLD: 0.19 K/UL (ref 0–0.4)
EOSINOPHIL NFR BLD: 3.7 % (ref 0–7)
ERYTHROCYTE [DISTWIDTH] IN BLOOD BY AUTOMATED COUNT: 12.9 % (ref 11.5–14.5)
FOLATE SERPL-MCNC: 27.5 NG/ML (ref 5–21)
GLOBULIN SER CALC-MCNC: 3.4 G/DL (ref 2–4)
GLUCOSE SERPL-MCNC: 94 MG/DL (ref 65–100)
HCT VFR BLD AUTO: 40.6 % (ref 35–47)
HDLC SERPL-MCNC: 52 MG/DL
HDLC SERPL: 3.2 (ref 0–5)
HGB BLD-MCNC: 13.1 G/DL (ref 11.5–16)
IMM GRANULOCYTES # BLD AUTO: 0.01 K/UL (ref 0–0.04)
IMM GRANULOCYTES NFR BLD AUTO: 0.2 % (ref 0–0.5)
LDLC SERPL CALC-MCNC: 97.8 MG/DL (ref 0–100)
LYMPHOCYTES # BLD: 1.47 K/UL (ref 0.8–3.5)
LYMPHOCYTES NFR BLD: 28.7 % (ref 12–49)
MCH RBC QN AUTO: 29.4 PG (ref 26–34)
MCHC RBC AUTO-ENTMCNC: 32.3 G/DL (ref 30–36.5)
MCV RBC AUTO: 91.2 FL (ref 80–99)
MONOCYTES # BLD: 0.56 K/UL (ref 0–1)
MONOCYTES NFR BLD: 10.9 % (ref 5–13)
NEUTS SEG # BLD: 2.83 K/UL (ref 1.8–8)
NEUTS SEG NFR BLD: 55.1 % (ref 32–75)
NRBC # BLD: 0 K/UL (ref 0–0.01)
NRBC BLD-RTO: 0 PER 100 WBC
PLATELET # BLD AUTO: 238 K/UL (ref 150–400)
PMV BLD AUTO: 11.2 FL (ref 8.9–12.9)
POTASSIUM SERPL-SCNC: 4.1 MMOL/L (ref 3.5–5.1)
PROT SERPL-MCNC: 7.2 G/DL (ref 6.4–8.2)
RBC # BLD AUTO: 4.45 M/UL (ref 3.8–5.2)
SODIUM SERPL-SCNC: 138 MMOL/L (ref 136–145)
T4 FREE SERPL-MCNC: 1.1 NG/DL (ref 0.8–1.5)
TRIGL SERPL-MCNC: 86 MG/DL
TSH SERPL DL<=0.05 MIU/L-ACNC: 1.61 UIU/ML (ref 0.36–3.74)
VIT B12 SERPL-MCNC: 334 PG/ML (ref 193–986)
VLDLC SERPL CALC-MCNC: 17.2 MG/DL
WBC # BLD AUTO: 5.1 K/UL (ref 3.6–11)

## 2025-03-13 DIAGNOSIS — E78.00 HYPERCHOLESTEROLEMIA: ICD-10-CM

## 2025-03-13 RX ORDER — ATORVASTATIN CALCIUM 40 MG/1
TABLET, FILM COATED ORAL
Qty: 90 TABLET | Refills: 3 | Status: SHIPPED | OUTPATIENT
Start: 2025-03-13

## 2025-03-13 NOTE — TELEPHONE ENCOUNTER
PCP: Mary Anne Garcia MD     Last appt:  2/26/2025      Future Appointments   Date Time Provider Department Center   6/27/2025 11:10 AM Mary Anne Garcia MD [facility]   10/3/2025 10:00 AM Arin Hoffman ANP [id number] BS [facility]          Requested Prescriptions     Pending Prescriptions Disp Refills    atorvastatin (LIPITOR) 40 MG tablet [Pharmacy Med Name: Atorvastatin Calcium 40 MG Oral Tablet] 90 tablet 0     Sig: TAKE 1 TABLET BY MOUTH ONCE DAILY TO  LOWER  CHOLESTEROL

## 2025-06-27 ENCOUNTER — OFFICE VISIT (OUTPATIENT)
Facility: CLINIC | Age: 89
End: 2025-06-27
Payer: MEDICARE

## 2025-06-27 VITALS
TEMPERATURE: 97 F | HEIGHT: 64 IN | HEART RATE: 50 BPM | SYSTOLIC BLOOD PRESSURE: 116 MMHG | DIASTOLIC BLOOD PRESSURE: 64 MMHG | BODY MASS INDEX: 25.33 KG/M2 | RESPIRATION RATE: 16 BRPM | OXYGEN SATURATION: 99 % | WEIGHT: 148.4 LBS

## 2025-06-27 DIAGNOSIS — F41.1 GENERALIZED ANXIETY DISORDER: ICD-10-CM

## 2025-06-27 DIAGNOSIS — F32.0 MAJOR DEPRESSIVE DISORDER, SINGLE EPISODE, MILD: ICD-10-CM

## 2025-06-27 DIAGNOSIS — E03.9 ACQUIRED HYPOTHYROIDISM: ICD-10-CM

## 2025-06-27 DIAGNOSIS — F02.A0 MILD EARLY ONSET ALZHEIMER'S DEMENTIA WITHOUT BEHAVIORAL DISTURBANCE, PSYCHOTIC DISTURBANCE, MOOD DISTURBANCE, OR ANXIETY (HCC): ICD-10-CM

## 2025-06-27 DIAGNOSIS — G30.0 MILD EARLY ONSET ALZHEIMER'S DEMENTIA WITHOUT BEHAVIORAL DISTURBANCE, PSYCHOTIC DISTURBANCE, MOOD DISTURBANCE, OR ANXIETY (HCC): ICD-10-CM

## 2025-06-27 DIAGNOSIS — E55.9 VITAMIN D DEFICIENCY: ICD-10-CM

## 2025-06-27 DIAGNOSIS — I10 ESSENTIAL HYPERTENSION: ICD-10-CM

## 2025-06-27 DIAGNOSIS — E78.00 HYPERCHOLESTEROLEMIA: Primary | ICD-10-CM

## 2025-06-27 PROCEDURE — 1090F PRES/ABSN URINE INCON ASSESS: CPT | Performed by: INTERNAL MEDICINE

## 2025-06-27 PROCEDURE — 1160F RVW MEDS BY RX/DR IN RCRD: CPT | Performed by: INTERNAL MEDICINE

## 2025-06-27 PROCEDURE — G8419 CALC BMI OUT NRM PARAM NOF/U: HCPCS | Performed by: INTERNAL MEDICINE

## 2025-06-27 PROCEDURE — 1126F AMNT PAIN NOTED NONE PRSNT: CPT | Performed by: INTERNAL MEDICINE

## 2025-06-27 PROCEDURE — 1036F TOBACCO NON-USER: CPT | Performed by: INTERNAL MEDICINE

## 2025-06-27 PROCEDURE — G8427 DOCREV CUR MEDS BY ELIG CLIN: HCPCS | Performed by: INTERNAL MEDICINE

## 2025-06-27 PROCEDURE — 1159F MED LIST DOCD IN RCRD: CPT | Performed by: INTERNAL MEDICINE

## 2025-06-27 PROCEDURE — 1123F ACP DISCUSS/DSCN MKR DOCD: CPT | Performed by: INTERNAL MEDICINE

## 2025-06-27 PROCEDURE — 99214 OFFICE O/P EST MOD 30 MIN: CPT | Performed by: INTERNAL MEDICINE

## 2025-06-27 RX ORDER — LOSARTAN POTASSIUM 100 MG/1
100 TABLET ORAL DAILY
Qty: 90 TABLET | Refills: 3 | Status: SHIPPED | OUTPATIENT
Start: 2025-06-27

## 2025-06-27 RX ORDER — AMLODIPINE BESYLATE 5 MG/1
5 TABLET ORAL DAILY
Qty: 90 TABLET | Refills: 3 | Status: SHIPPED | OUTPATIENT
Start: 2025-06-27

## 2025-06-27 RX ORDER — DONEPEZIL HYDROCHLORIDE 10 MG/1
10 TABLET, FILM COATED ORAL NIGHTLY
Qty: 90 TABLET | Refills: 3 | Status: SHIPPED | OUTPATIENT
Start: 2025-06-27

## 2025-06-27 RX ORDER — LEVOTHYROXINE SODIUM 100 UG/1
100 TABLET ORAL
Qty: 90 TABLET | Refills: 3 | Status: SHIPPED | OUTPATIENT
Start: 2025-06-27

## 2025-06-27 RX ORDER — METOPROLOL SUCCINATE 25 MG/1
25 TABLET, EXTENDED RELEASE ORAL DAILY
Qty: 90 TABLET | Refills: 3 | Status: SHIPPED | OUTPATIENT
Start: 2025-06-27

## 2025-06-27 RX ORDER — CLONIDINE HYDROCHLORIDE 0.1 MG/1
0.1 TABLET ORAL 2 TIMES DAILY
Qty: 180 TABLET | Refills: 3 | Status: SHIPPED | OUTPATIENT
Start: 2025-06-27

## 2025-06-27 RX ORDER — VENLAFAXINE HYDROCHLORIDE 75 MG/1
75 CAPSULE, EXTENDED RELEASE ORAL DAILY
Qty: 90 CAPSULE | Refills: 3 | Status: SHIPPED | OUTPATIENT
Start: 2025-06-27

## 2025-06-27 NOTE — PROGRESS NOTES
Arin Roth  Identified pt with two pt identifiers(name and ).  Chief Complaint   Patient presents with    Hypertension    Dementia       1. Have you been to the ER, urgent care clinic since your last visit?  Hospitalized since your last visit? NO    2. Have you seen or consulted any other health care providers outside of the [facility] since your last visit?  Include any pap smears or colon screening. NO      Provider notified of reason for visit, vitals and flowsheets obtained on patients.     Patient received paperwork for advance directive during previous visit but has not completed at this time     Reviewed record In preparation for visit, huddled with provider and have obtained necessary documentation      There are no preventive care reminders to display for this patient.    Wt Readings from Last 3 Encounters:   25 67.3 kg (148 lb 6.4 oz)   25 66.7 kg (147 lb)   24 69 kg (152 lb 3.2 oz)     Temp Readings from Last 3 Encounters:   25 97 °F (36.1 °C) (Temporal)   25 98.2 °F (36.8 °C) (Oral)   24 97.6 °F (36.4 °C) (Oral)     BP Readings from Last 3 Encounters:   25 116/64   25 130/89   24 (!) 162/98     Pulse Readings from Last 3 Encounters:   25 50   25 72   24 90          No data to display                  Learning Assessment:  :         2024     8:10 AM   [facility] AMB LEARNING ASSESSMENT   Primary Learner Patient   level of education GRADUATED HIGH SCHOOL OR GED   Primary Language ENGLISH   Learning Preference DEMONSTRATION   Answered By Patient   Relationship to Learner SELF       Fall Risk Assessment:  :         2025    10:34 AM 2023     9:17 AM 2022     8:08 AM 2021    11:20 AM 2021    10:57 AM 6/15/2021    11:10 AM 2021    10:14 AM   Amb Fall Risk Assessment and TUG Test   Do you feel unsteady or are you worried about falling?  no no        2 or more falls in past year? no no     
oz)   SpO2 99%   BMI 25.47 kg/m²     General: Well-developed and well-nourished, no distress.  HEENT:  Head normocephalic/atraumatic, no scleral icterus  Neck: Supple. No carotid bruits, JVD, lymphadenopathy, or thyromegaly.  Lungs:  Clear to auscultation bilaterally. Good air movement.  Heart:  Regular rate and rhythm, normal S1 and S2, no murmur, gallop, or rub  Extremities: No clubbing, cyanosis, or edema. 2+ pedal pulses bilaterally.  Neurological: Alert and oriented x 2. No focal deficits.  Psychiatric: Normal mood and affect. Behavior is normal.        ASSESSMENT AND PLAN      ICD-10-CM    1. Hypercholesterolemia  E78.00 Lipid Panel      2. Essential hypertension  I10 amLODIPine (NORVASC) 5 MG tablet     cloNIDine (CATAPRES) 0.1 MG tablet     losartan (COZAAR) 100 MG tablet     metoprolol succinate (TOPROL XL) 25 MG extended release tablet     CBC with Auto Differential     Comprehensive Metabolic Panel      3. 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 Acquired hypothyroidism  E03.9 levothyroxine (SYNTHROID) 100 MCG tablet     TSH      5. Major depressive disorder, single episode, mild  F32.0 venlafaxine (EFFEXOR XR) 75 MG extended release capsule      6. Generalized anxiety disorder  F41.1 venlafaxine (EFFEXOR XR) 75 MG extended release capsule      7. Vitamin D deficiency  E55.9 Vitamin D 25 Hydroxy         Diagnoses and all orders for this visit:    1. Essential hypertension  Controlled.  - Refill amLODIPine (NORVASC) 5 MG tablet; Take 1 tablet by mouth daily For high blood pressure  Dispense: 90 tablet; Refill: 3  - Refill cloNIDine (CATAPRES) 0.1 MG tablet; Take 1 tablet by mouth 2 times daily For high blood pressure  Dispense: 180 tablet; Refill: 3  -Refill  losartan (COZAAR) 100 MG tablet; Take 1 tablet by mouth daily For high blood pressure  Dispense: 90

## 2025-07-25 ENCOUNTER — TELEPHONE (OUTPATIENT)
Facility: CLINIC | Age: 89
End: 2025-07-25

## 2025-07-25 NOTE — TELEPHONE ENCOUNTER
Pt daughter Lisbeth called and wanted to speak to the nurse because they are looking into trying to get someone to come in longterm to help with the pt. The pt is not even remembering to eat anymore.

## 2025-08-01 NOTE — TELEPHONE ENCOUNTER
Spoke to Lisbeth pt's daughter, she is not on HIPAA so I was not able to give her information pertaining to pt. She did verify pt by name and . She stated they needed to know if she has a DX of dementia so they can get her care through insurance. I let her know due to not being on HIPAA I was not allowed to give her any of that information. I let her know that she needed to be added to the HIPAA form so that I can talk to her in regards to pt. She understood and stated she will have her brother call back. No further questions.

## 2025-08-07 ENCOUNTER — TELEPHONE (OUTPATIENT)
Facility: CLINIC | Age: 89
End: 2025-08-07

## (undated) DEVICE — NON-REM POLYHESIVE PATIENT RETURN ELECTRODE: Brand: VALLEYLAB

## (undated) DEVICE — SYR 3ML LL TIP 1/10ML GRAD --

## (undated) DEVICE — TOWEL 4 PLY TISS 19X30 SUE WHT

## (undated) DEVICE — TRAP SUC MUCOUS 70ML -- MEDICHOICE MEDLINE

## (undated) DEVICE — SYR 10ML LUER LOK 1/5ML GRAD --

## (undated) DEVICE — NEONATAL-ADULT SPO2 SENSOR: Brand: NELLCOR

## (undated) DEVICE — NEEDLE HYPO 18GA L1.5IN PNK S STL HUB POLYPR SHLD REG BVL

## (undated) DEVICE — SET ADMIN 16ML TBNG L100IN 2 Y INJ SITE IV PIGGY BK DISP

## (undated) DEVICE — SOLIDIFIER MEDC 1200ML -- CONVERT TO 356117

## (undated) DEVICE — Device

## (undated) DEVICE — STRAINER URIN CALC RNL MSH -- CONVERT TO ITEM 357634

## (undated) DEVICE — Z DISCONTINUED PER MEDLINE LINE GAS SAMPLING O2/CO2 LNG AD 13 FT NSL W/ TBNG FILTERLINE

## (undated) DEVICE — CATH IV AUTOGRD BC PNK 20GA 25 -- INSYTE

## (undated) DEVICE — SNARE ENDOSCP M L240CM W27MM SHTH DIA2.4MM CHN 2.8MM OVL

## (undated) DEVICE — CONTAINER SPEC 20 ML LID NEUT BUFF FORMALIN 10 % POLYPR STS

## (undated) DEVICE — 1200 GUARD II KIT W/5MM TUBE W/O VAC TUBE: Brand: GUARDIAN

## (undated) DEVICE — KENDALL RADIOLUCENT FOAM MONITORING ELECTRODE RECTANGULAR SHAPE: Brand: KENDALL

## (undated) DEVICE — BASIN EMSIS 16OZ GRAPHITE PLAS KID SHP MOLD GRAD FOR ORAL